# Patient Record
Sex: MALE | Race: WHITE | Employment: OTHER | URBAN - NONMETROPOLITAN AREA
[De-identification: names, ages, dates, MRNs, and addresses within clinical notes are randomized per-mention and may not be internally consistent; named-entity substitution may affect disease eponyms.]

---

## 2017-01-19 ENCOUNTER — OFFICE VISIT (OUTPATIENT)
Dept: INTERNAL MEDICINE CLINIC | Age: 82
End: 2017-01-19

## 2017-01-19 VITALS
HEART RATE: 68 BPM | SYSTOLIC BLOOD PRESSURE: 128 MMHG | RESPIRATION RATE: 16 BRPM | WEIGHT: 207 LBS | TEMPERATURE: 98.8 F | OXYGEN SATURATION: 94 % | BODY MASS INDEX: 31.37 KG/M2 | HEIGHT: 68 IN | DIASTOLIC BLOOD PRESSURE: 74 MMHG

## 2017-01-19 DIAGNOSIS — I77.9 LEFT-SIDED CAROTID ARTERY DISEASE (HCC): ICD-10-CM

## 2017-01-19 DIAGNOSIS — I10 ESSENTIAL HYPERTENSION: Primary | ICD-10-CM

## 2017-01-19 DIAGNOSIS — R73.9 HYPERGLYCEMIA: ICD-10-CM

## 2017-01-19 DIAGNOSIS — E78.2 MIXED HYPERLIPIDEMIA: ICD-10-CM

## 2017-01-19 LAB — HBA1C MFR BLD: 5.9 %

## 2017-01-19 PROCEDURE — 99213 OFFICE O/P EST LOW 20 MIN: CPT | Performed by: INTERNAL MEDICINE

## 2017-01-19 PROCEDURE — 83036 HEMOGLOBIN GLYCOSYLATED A1C: CPT | Performed by: INTERNAL MEDICINE

## 2017-01-19 RX ORDER — LOSARTAN POTASSIUM 100 MG/1
100 TABLET ORAL DAILY
Qty: 90 TABLET | Refills: 1 | Status: SHIPPED | OUTPATIENT
Start: 2017-01-19 | End: 2017-09-11 | Stop reason: SDUPTHER

## 2017-01-19 RX ORDER — VERAPAMIL HYDROCHLORIDE 240 MG/1
240 CAPSULE, EXTENDED RELEASE ORAL DAILY
Qty: 90 CAPSULE | Refills: 1 | Status: SHIPPED | OUTPATIENT
Start: 2017-01-19 | End: 2017-09-11 | Stop reason: SDUPTHER

## 2017-01-19 RX ORDER — HYDROCHLOROTHIAZIDE 12.5 MG/1
12.5 TABLET ORAL DAILY
Qty: 90 TABLET | Refills: 1 | Status: SHIPPED | OUTPATIENT
Start: 2017-01-19 | End: 2017-05-03 | Stop reason: ALTCHOICE

## 2017-01-19 RX ORDER — LOVASTATIN 40 MG/1
40 TABLET ORAL NIGHTLY
Qty: 90 TABLET | Refills: 1 | Status: SHIPPED | OUTPATIENT
Start: 2017-01-19 | End: 2017-09-11 | Stop reason: SDUPTHER

## 2017-01-19 RX ORDER — METOPROLOL SUCCINATE 50 MG/1
50 TABLET, EXTENDED RELEASE ORAL 2 TIMES DAILY
Qty: 180 TABLET | Refills: 1 | Status: SHIPPED | OUTPATIENT
Start: 2017-01-19 | End: 2017-09-11 | Stop reason: SDUPTHER

## 2017-01-19 ASSESSMENT — ENCOUNTER SYMPTOMS
GASTROINTESTINAL NEGATIVE: 1
RESPIRATORY NEGATIVE: 1
EYES NEGATIVE: 1

## 2017-04-20 ENCOUNTER — HOSPITAL ENCOUNTER (OUTPATIENT)
Dept: LAB | Age: 82
Discharge: HOME | End: 2017-04-20
Attending: INTERNAL MEDICINE | Admitting: INTERNAL MEDICINE

## 2017-04-20 LAB
ALBUMIN SERPL-MCNC: 3.8 GM/DL (ref 3.4–5)
ALP BLD-CCNC: 64 IU/L (ref 40–129)
ALT SERPL-CCNC: 15 U/L (ref 10–40)
ANION GAP SERPL CALCULATED.3IONS-SCNC: 15 MMOL/L (ref 4–16)
AST SERPL-CCNC: 16 IU/L (ref 15–37)
BILIRUB SERPL-MCNC: 0.5 MG/DL (ref 0–1)
BUN BLDV-MCNC: 34 MG/DL (ref 6–23)
CALCIUM SERPL-MCNC: 9.1 MG/DL (ref 8.3–10.6)
CHLORIDE BLD-SCNC: 100 MMOL/L (ref 99–110)
CHOLESTEROL, FASTING: 135 MG/DL
CO2: 26 MMOL/L (ref 21–32)
CREAT SERPL-MCNC: 1.7 MG/DL (ref 0.9–1.3)
GFR AFRICAN AMERICAN: 47 ML/MIN/1.73M2
GFR NON-AFRICAN AMERICAN: 39 ML/MIN/1.73M2
GLUCOSE FASTING: 119 MG/DL (ref 70–99)
HDLC SERPL-MCNC: 36 MG/DL
LDL CHOLESTEROL DIRECT: 77 MG/DL
POTASSIUM SERPL-SCNC: 3.8 MMOL/L (ref 3.5–5.1)
SODIUM BLD-SCNC: 141 MMOL/L (ref 135–145)
TOTAL PROTEIN: 8.1 GM/DL (ref 6.4–8.2)
TRIGLYCERIDE, FASTING: 162 MG/DL

## 2017-05-03 ENCOUNTER — OFFICE VISIT (OUTPATIENT)
Dept: INTERNAL MEDICINE CLINIC | Age: 82
End: 2017-05-03

## 2017-05-03 VITALS
OXYGEN SATURATION: 95 % | RESPIRATION RATE: 16 BRPM | WEIGHT: 208.23 LBS | TEMPERATURE: 97.6 F | HEART RATE: 62 BPM | DIASTOLIC BLOOD PRESSURE: 68 MMHG | BODY MASS INDEX: 32.13 KG/M2 | SYSTOLIC BLOOD PRESSURE: 136 MMHG

## 2017-05-03 DIAGNOSIS — I10 ESSENTIAL HYPERTENSION: Primary | ICD-10-CM

## 2017-05-03 DIAGNOSIS — N28.9 RENAL INSUFFICIENCY: ICD-10-CM

## 2017-05-03 DIAGNOSIS — J98.4 RESTRICTIVE LUNG DISEASE: ICD-10-CM

## 2017-05-03 DIAGNOSIS — I77.9 LEFT-SIDED CAROTID ARTERY DISEASE (HCC): ICD-10-CM

## 2017-05-03 DIAGNOSIS — R73.9 HYPERGLYCEMIA: ICD-10-CM

## 2017-05-03 DIAGNOSIS — E78.2 MIXED HYPERLIPIDEMIA: ICD-10-CM

## 2017-05-03 PROCEDURE — 99213 OFFICE O/P EST LOW 20 MIN: CPT | Performed by: INTERNAL MEDICINE

## 2017-05-03 ASSESSMENT — PATIENT HEALTH QUESTIONNAIRE - PHQ9
SUM OF ALL RESPONSES TO PHQ QUESTIONS 1-9: 0
SUM OF ALL RESPONSES TO PHQ9 QUESTIONS 1 & 2: 0
2. FEELING DOWN, DEPRESSED OR HOPELESS: 0
1. LITTLE INTEREST OR PLEASURE IN DOING THINGS: 0

## 2017-05-03 ASSESSMENT — ENCOUNTER SYMPTOMS
RESPIRATORY NEGATIVE: 1
DOUBLE VISION: 0
EYES NEGATIVE: 1
SHORTNESS OF BREATH: 0
BLURRED VISION: 0
GASTROINTESTINAL NEGATIVE: 1
COUGH: 0

## 2017-05-20 ENCOUNTER — HOSPITAL ENCOUNTER (OUTPATIENT)
Dept: LAB | Age: 82
Discharge: HOME | End: 2017-05-20
Attending: INTERNAL MEDICINE | Admitting: INTERNAL MEDICINE

## 2017-05-20 LAB
ANION GAP SERPL CALCULATED.3IONS-SCNC: 11 MMOL/L (ref 4–16)
BUN BLDV-MCNC: 19 MG/DL (ref 6–23)
CALCIUM SERPL-MCNC: 9.3 MG/DL (ref 8.3–10.6)
CHLORIDE BLD-SCNC: 102 MMOL/L (ref 99–110)
CO2: 26 MMOL/L (ref 21–32)
CREAT SERPL-MCNC: 1.2 MG/DL (ref 0.9–1.3)
GFR AFRICAN AMERICAN: >60 ML/MIN/1.73M2
GFR NON-AFRICAN AMERICAN: 58 ML/MIN/1.73M2
GLUCOSE BLD-MCNC: 112 MG/DL (ref 70–140)
POTASSIUM SERPL-SCNC: 4.6 MMOL/L (ref 3.5–5.1)
SODIUM BLD-SCNC: 139 MMOL/L (ref 135–145)

## 2017-06-05 ENCOUNTER — OFFICE VISIT (OUTPATIENT)
Dept: INTERNAL MEDICINE CLINIC | Age: 82
End: 2017-06-05

## 2017-06-05 VITALS
HEIGHT: 68 IN | DIASTOLIC BLOOD PRESSURE: 72 MMHG | TEMPERATURE: 98 F | BODY MASS INDEX: 31.67 KG/M2 | HEART RATE: 64 BPM | RESPIRATION RATE: 12 BRPM | OXYGEN SATURATION: 93 % | WEIGHT: 209 LBS | SYSTOLIC BLOOD PRESSURE: 134 MMHG

## 2017-06-05 DIAGNOSIS — N28.9 RENAL INSUFFICIENCY: Primary | ICD-10-CM

## 2017-06-05 DIAGNOSIS — I77.9 LEFT-SIDED CAROTID ARTERY DISEASE (HCC): ICD-10-CM

## 2017-06-05 DIAGNOSIS — E78.2 MIXED HYPERLIPIDEMIA: ICD-10-CM

## 2017-06-05 DIAGNOSIS — R73.9 HYPERGLYCEMIA: ICD-10-CM

## 2017-06-05 DIAGNOSIS — I10 ESSENTIAL HYPERTENSION: ICD-10-CM

## 2017-06-05 PROCEDURE — 99213 OFFICE O/P EST LOW 20 MIN: CPT | Performed by: INTERNAL MEDICINE

## 2017-06-05 ASSESSMENT — ENCOUNTER SYMPTOMS
NAUSEA: 0
ABDOMINAL PAIN: 0
VOMITING: 0

## 2017-09-11 ENCOUNTER — OFFICE VISIT (OUTPATIENT)
Dept: INTERNAL MEDICINE CLINIC | Age: 82
End: 2017-09-11

## 2017-09-11 VITALS
TEMPERATURE: 97.8 F | RESPIRATION RATE: 20 BRPM | SYSTOLIC BLOOD PRESSURE: 160 MMHG | DIASTOLIC BLOOD PRESSURE: 76 MMHG | OXYGEN SATURATION: 93 % | HEART RATE: 72 BPM | BODY MASS INDEX: 33.15 KG/M2 | WEIGHT: 214.8 LBS

## 2017-09-11 DIAGNOSIS — J98.4 RESTRICTIVE LUNG DISEASE: ICD-10-CM

## 2017-09-11 DIAGNOSIS — E78.2 MIXED HYPERLIPIDEMIA: ICD-10-CM

## 2017-09-11 DIAGNOSIS — R73.9 HYPERGLYCEMIA: ICD-10-CM

## 2017-09-11 DIAGNOSIS — I77.9 LEFT-SIDED CAROTID ARTERY DISEASE (HCC): ICD-10-CM

## 2017-09-11 DIAGNOSIS — N28.9 RENAL INSUFFICIENCY: ICD-10-CM

## 2017-09-11 DIAGNOSIS — I10 ESSENTIAL HYPERTENSION: Primary | ICD-10-CM

## 2017-09-11 PROCEDURE — 99213 OFFICE O/P EST LOW 20 MIN: CPT | Performed by: INTERNAL MEDICINE

## 2017-09-11 RX ORDER — METOPROLOL SUCCINATE 50 MG/1
50 TABLET, EXTENDED RELEASE ORAL 2 TIMES DAILY
Qty: 180 TABLET | Refills: 1 | Status: SHIPPED | OUTPATIENT
Start: 2017-09-11 | End: 2018-01-11 | Stop reason: SDUPTHER

## 2017-09-11 RX ORDER — VERAPAMIL HYDROCHLORIDE 240 MG/1
240 CAPSULE, EXTENDED RELEASE ORAL DAILY
Qty: 90 CAPSULE | Refills: 1 | Status: SHIPPED | OUTPATIENT
Start: 2017-09-11 | End: 2018-01-11 | Stop reason: SDUPTHER

## 2017-09-11 RX ORDER — LOSARTAN POTASSIUM 100 MG/1
100 TABLET ORAL DAILY
Qty: 90 TABLET | Refills: 1 | Status: SHIPPED | OUTPATIENT
Start: 2017-09-11 | End: 2018-01-11 | Stop reason: SDUPTHER

## 2017-09-11 RX ORDER — LOVASTATIN 40 MG/1
40 TABLET ORAL NIGHTLY
Qty: 90 TABLET | Refills: 1 | Status: SHIPPED | OUTPATIENT
Start: 2017-09-11 | End: 2018-01-11 | Stop reason: SDUPTHER

## 2017-09-11 RX ORDER — DOXAZOSIN MESYLATE 1 MG/1
1 TABLET ORAL DAILY
Qty: 90 TABLET | Refills: 1 | Status: SHIPPED | OUTPATIENT
Start: 2017-09-11 | End: 2017-12-12 | Stop reason: ALTCHOICE

## 2017-09-11 ASSESSMENT — ENCOUNTER SYMPTOMS
BLOOD IN STOOL: 0
HEARTBURN: 0
RESPIRATORY NEGATIVE: 1
NAUSEA: 0
EYES NEGATIVE: 1
GASTROINTESTINAL NEGATIVE: 1

## 2017-09-18 ENCOUNTER — TELEPHONE (OUTPATIENT)
Dept: INTERNAL MEDICINE CLINIC | Age: 82
End: 2017-09-18

## 2017-12-11 ENCOUNTER — TELEPHONE (OUTPATIENT)
Dept: INTERNAL MEDICINE CLINIC | Age: 82
End: 2017-12-11

## 2017-12-12 ENCOUNTER — OFFICE VISIT (OUTPATIENT)
Dept: INTERNAL MEDICINE CLINIC | Age: 82
End: 2017-12-12

## 2017-12-12 VITALS
TEMPERATURE: 98.1 F | WEIGHT: 220 LBS | BODY MASS INDEX: 34.53 KG/M2 | SYSTOLIC BLOOD PRESSURE: 120 MMHG | HEART RATE: 76 BPM | RESPIRATION RATE: 12 BRPM | OXYGEN SATURATION: 96 % | DIASTOLIC BLOOD PRESSURE: 64 MMHG | HEIGHT: 67 IN

## 2017-12-12 DIAGNOSIS — J98.4 RESTRICTIVE LUNG DISEASE: ICD-10-CM

## 2017-12-12 DIAGNOSIS — E78.2 MIXED HYPERLIPIDEMIA: ICD-10-CM

## 2017-12-12 DIAGNOSIS — M25.561 ACUTE PAIN OF RIGHT KNEE: Primary | ICD-10-CM

## 2017-12-12 DIAGNOSIS — M17.11 PRIMARY OSTEOARTHRITIS OF RIGHT KNEE: ICD-10-CM

## 2017-12-12 DIAGNOSIS — I10 ESSENTIAL HYPERTENSION: ICD-10-CM

## 2017-12-12 PROCEDURE — 99213 OFFICE O/P EST LOW 20 MIN: CPT | Performed by: INTERNAL MEDICINE

## 2017-12-12 NOTE — PATIENT INSTRUCTIONS
Spoke w Aleta Ya, intake nurse for [facility], had all needed paper work will eval/tx tomorrow.

## 2017-12-12 NOTE — PROGRESS NOTES
ABDOMEN:  Soft and non-tender,no masses  or organomegaly. EXTREMITIES:  No cyanosis, clubbing, or significant edema. Right knee tenderness. SKIN: Skin is warm and dry. NEUROLOGICAL:  Cranial nerves II through XII are grossly intact. IMPRESSION:    Encounter Diagnoses   Name Primary?  Acute pain of right knee Yes    Primary osteoarthritis of right knee     Essential hypertension     Mixed hyperlipidemia     Restrictive lung disease        ASSESSMENT/PLAN:    1. Right knee pain :  Pt is having difficulty ambulating. Will consult [facility] to see pt and do PT etc.  Take Tylenol for pain control. follow-up with the orthopedic surgeon. Patient is a home bound. He does not drive. Patient is not taking Lodine. 2.  Hypertension is controlled continue current medication    3. Hyperlipidemia stable. Patient on lovastatin    4. No shortness of breath    Return to office in 1 month    Mediations reviewed with the patient. Continue current medications. Appropriate prescriptions are addressed. After visit summery provided. Follow up as directed sooner if needed. Questions answered and patient verbalizes understanding. Call for any problems, questions, or concerns. No Known Allergies  Current Outpatient Prescriptions   Medication Sig Dispense Refill    verapamil (VERELAN) 240 MG extended release capsule Take 1 capsule by mouth daily 90 capsule 1    losartan (COZAAR) 100 MG tablet Take 1 tablet by mouth daily 90 tablet 1    metoprolol succinate (TOPROL XL) 50 MG extended release tablet Take 1 tablet by mouth 2 times daily 180 tablet 1    lovastatin (MEVACOR) 40 MG tablet Take 1 tablet by mouth nightly 90 tablet 1    doxazosin (CARDURA) 1 MG tablet Take 1 tablet by mouth daily 90 tablet 1    aspirin 81 MG tablet Take 81 mg by mouth daily.  Omega 3 1000 MG CAPS Take 2 capsules by mouth 2 times daily.       etodolac (LODINE) 400 MG tablet Take 400 mg by mouth 2 times daily     

## 2017-12-17 ASSESSMENT — ENCOUNTER SYMPTOMS
GASTROINTESTINAL NEGATIVE: 1
EYES NEGATIVE: 1

## 2017-12-19 ENCOUNTER — TELEPHONE (OUTPATIENT)
Dept: INTERNAL MEDICINE CLINIC | Age: 82
End: 2017-12-19

## 2018-01-01 ENCOUNTER — HOSPITAL ENCOUNTER (OUTPATIENT)
Dept: OTHER | Age: 83
Discharge: HOME | End: 2018-01-31
Attending: INTERNAL MEDICINE | Admitting: INTERNAL MEDICINE

## 2018-01-11 ENCOUNTER — TELEPHONE (OUTPATIENT)
Dept: INTERNAL MEDICINE CLINIC | Age: 83
End: 2018-01-11

## 2018-01-11 RX ORDER — LOVASTATIN 40 MG/1
40 TABLET ORAL NIGHTLY
Qty: 90 TABLET | Refills: 1 | Status: SHIPPED | OUTPATIENT
Start: 2018-01-11 | End: 2018-05-10 | Stop reason: SDUPTHER

## 2018-01-11 RX ORDER — METOPROLOL SUCCINATE 50 MG/1
50 TABLET, EXTENDED RELEASE ORAL 2 TIMES DAILY
Qty: 180 TABLET | Refills: 1 | Status: SHIPPED | OUTPATIENT
Start: 2018-01-11 | End: 2018-05-10 | Stop reason: SDUPTHER

## 2018-01-11 RX ORDER — LOSARTAN POTASSIUM 100 MG/1
100 TABLET ORAL DAILY
Qty: 90 TABLET | Refills: 1 | Status: SHIPPED | OUTPATIENT
Start: 2018-01-11 | End: 2018-05-10 | Stop reason: SDUPTHER

## 2018-01-11 RX ORDER — VERAPAMIL HYDROCHLORIDE 240 MG/1
240 CAPSULE, EXTENDED RELEASE ORAL DAILY
Qty: 90 CAPSULE | Refills: 1 | Status: SHIPPED | OUTPATIENT
Start: 2018-01-11 | End: 2018-05-10 | Stop reason: SDUPTHER

## 2018-01-12 ENCOUNTER — HOSPITAL ENCOUNTER (OUTPATIENT)
Dept: OTHER | Age: 83
Discharge: HOME | End: 2018-01-12
Attending: INTERNAL MEDICINE | Admitting: INTERNAL MEDICINE

## 2018-01-12 LAB
ERYTHROCYTE SEDIMENTATION RATE: 108 MM/HR (ref 0–20)
URIC ACID: 4.5 MG/DL (ref 3.5–7.2)

## 2018-01-14 LAB — RHEUMATOID FACTOR: NEGATIVE

## 2018-01-15 LAB — ANTI-NUCLEAR ANTIBODY (ANA): NORMAL

## 2018-01-18 LAB
ALBUMIN SERPL-MCNC: 3 GM/DL (ref 3.4–5)
ALP BLD-CCNC: 62 IU/L (ref 40–129)
ALT SERPL-CCNC: 9 U/L (ref 10–40)
ANION GAP SERPL CALCULATED.3IONS-SCNC: 11 MMOL/L (ref 4–16)
AST SERPL-CCNC: 14 IU/L (ref 15–37)
BILIRUB SERPL-MCNC: 0.4 MG/DL (ref 0–1)
BUN BLDV-MCNC: 16 MG/DL (ref 6–23)
CALCIUM SERPL-MCNC: 8.7 MG/DL (ref 8.3–10.6)
CHLORIDE BLD-SCNC: 101 MMOL/L (ref 99–110)
CO2: 26 MMOL/L (ref 21–32)
CREAT SERPL-MCNC: 1 MG/DL (ref 0.9–1.3)
ESTIMATED AVERAGE GLUCOSE: 131 MG/DL
GFR AFRICAN AMERICAN: >60 ML/MIN/1.73M2
GFR NON-AFRICAN AMERICAN: >60 ML/MIN/1.73M2
GLUCOSE BLD-MCNC: 101 MG/DL (ref 70–99)
HBA1C MFR BLD: 6.2 % (ref 4.2–6.3)
LDL CHOLESTEROL DIRECT: 62 MG/DL
POTASSIUM SERPL-SCNC: 4.7 MMOL/L (ref 3.5–5.1)
SODIUM BLD-SCNC: 138 MMOL/L (ref 135–145)
TOTAL PROTEIN: 6.8 GM/DL (ref 6.4–8.2)

## 2018-01-22 LAB — HEMOGLOBIN: 11.3 GM/DL (ref 13.5–18)

## 2018-01-24 ENCOUNTER — OFFICE VISIT (OUTPATIENT)
Dept: INTERNAL MEDICINE CLINIC | Age: 83
End: 2018-01-24

## 2018-01-24 VITALS
DIASTOLIC BLOOD PRESSURE: 80 MMHG | HEART RATE: 79 BPM | BODY MASS INDEX: 33.67 KG/M2 | SYSTOLIC BLOOD PRESSURE: 130 MMHG | RESPIRATION RATE: 18 BRPM | OXYGEN SATURATION: 96 % | WEIGHT: 215 LBS | TEMPERATURE: 97.9 F

## 2018-01-24 DIAGNOSIS — J98.4 RESTRICTIVE LUNG DISEASE: ICD-10-CM

## 2018-01-24 DIAGNOSIS — N28.9 RENAL INSUFFICIENCY: ICD-10-CM

## 2018-01-24 DIAGNOSIS — I10 ESSENTIAL HYPERTENSION: ICD-10-CM

## 2018-01-24 DIAGNOSIS — E78.2 MIXED HYPERLIPIDEMIA: ICD-10-CM

## 2018-01-24 DIAGNOSIS — I77.9 LEFT-SIDED CAROTID ARTERY DISEASE (HCC): ICD-10-CM

## 2018-01-24 DIAGNOSIS — M25.561 ACUTE PAIN OF RIGHT KNEE: ICD-10-CM

## 2018-01-24 DIAGNOSIS — R73.9 HYPERGLYCEMIA: ICD-10-CM

## 2018-01-24 PROCEDURE — 99214 OFFICE O/P EST MOD 30 MIN: CPT | Performed by: INTERNAL MEDICINE

## 2018-01-24 ASSESSMENT — ENCOUNTER SYMPTOMS
EYES NEGATIVE: 1
WHEEZING: 0
SHORTNESS OF BREATH: 0
RESPIRATORY NEGATIVE: 1
GASTROINTESTINAL NEGATIVE: 1
ORTHOPNEA: 0
HEMOPTYSIS: 0
COUGH: 0

## 2018-01-24 NOTE — ASSESSMENT & PLAN NOTE
Patient has pain in the right knee for few months  Has seen Dr Brandy Valdivia and is going to have arthroscopic surgery  [address] nurse state they have all information and anesthesiologist approved it. EKG showed no acute changes looks normal. Done at [facility].  Pt denies any chest pain. No SOB.  No h/o CHF

## 2018-01-24 NOTE — ASSESSMENT & PLAN NOTE
Hyperlipidemia is stable. Follow low cholesterol diet. Continue current treatment.   Patient has hyperlipidemia and is taking lovastatin 40 mg daily

## 2018-01-24 NOTE — PROGRESS NOTES
noted. No masses felt,  CARDIOVASCULAR:  Normal S1 and S2    PULMONARY:  No respiratory distress. No wheezes or rales. ABDOMEN:  Soft and non-tender,no masses  or organomegaly. EXTREMITIES:  No cyanosis, clubbing, or significant edema. Right knee tenderness. No effusion. No calf tenderness. SKIN: Skin is warm and dry. NEUROLOGICAL:  Cranial nerves II through XII are grossly intact. IMPRESSION:    Encounter Diagnoses   Name Primary?  Essential hypertension     Mixed hyperlipidemia     Hyperglycemia     Left-sided carotid artery disease ([facility]): R      Restrictive lung disease     Renal insufficiency     Acute pain of right knee        ASSESSMENT/PLAN:    Essential hypertension  Patient has hypertension and is on medications  He is taking verapamil 240 mg daily, losartan 100 mg daily, metoprolol 50 mg twice a day    Mixed hyperlipidemia  Hyperlipidemia is stable. Follow low cholesterol diet. Continue current treatment. Patient has hyperlipidemia and is taking lovastatin 40 mg daily      Hyperglycemia  Pt denies any polyuria and polydypsia  A1c 6.2   Follow diet     Left-sided carotid artery disease ([facility]): R   Complete occlusion of left. R carotid endarterectomy  Carotid doppler . R  Is patent. Carotid doppler 6/15  R is patent  Carotid doppler 10/16 R is 0-50%  Left is completely occluded,referred to Dr. Mady Du. Recheck in one year but did not see him   Pt to call and make appointment  Will order carotid doppler    Restrictive lung disease  Patient denies any shortness of breath. No chest pain. No cough. No edema. Renal insufficiency  Cr is 1.0. Symptoms resolved. Right knee pain  Patient has pain in the right knee for few months  Has seen Dr Elayne Carolina and is going to have arthroscopic surgery  [address] [place] [address] nurse state they have all information and anesthesiologist approved it.   EKG showed no acute changes looks normal. Done at [facility].  Pt denies any chest pain. No SOB. No h/o CHF    Return to office in a month I will see him in assisted living. Mediations reviewed with the patient. Continue current medications. Appropriate prescriptions are addressed. After visit jazmyne provided. Follow up as directed sooner if needed. Questions answered and patient verbalizes understanding. Call for any problems, questions, or concerns. No Known Allergies  Current Outpatient Prescriptions   Medication Sig Dispense Refill    losartan (COZAAR) 100 MG tablet Take 1 tablet by mouth daily 90 tablet 1    lovastatin (MEVACOR) 40 MG tablet Take 1 tablet by mouth nightly 90 tablet 1    metoprolol succinate (TOPROL XL) 50 MG extended release tablet Take 1 tablet by mouth 2 times daily 180 tablet 1    verapamil (VERELAN) 240 MG extended release capsule Take 1 capsule by mouth daily 90 capsule 1    aspirin 81 MG tablet Take 81 mg by mouth daily.  Omega 3 1000 MG CAPS Take 2 capsules by mouth 2 times daily. No current facility-administered medications for this visit. Past Medical History:   Diagnosis Date    Carotid artery disease ([facility]     complete occlusion left ICA, status post right carotid endarterectomy    Cellulitis of right lower extremity 7/1/2016    Was admitted in [address] [place] [address] in 7/16    Chronic cough 12/18/2015    Chest x-ray showed mild pulmonary congestion that is chronic. And some atelectasis PFT showed no obstructive disease. Has restrictive lung disease.  H/O colonoscopy 8/10    Dr. Rah Upton H/O Doppler ultrasound 08/2009, 2011,5/13    carotid doppler left % blocked, right patent    HTN (hypertension)     Hyperglycemia 11/19/2014    A1c 6.1    Hyperlipidemia     Left-sided carotid artery disease ([facility] 9/18/2015    Complete occlusion of left. R carotid endarterectomy Carotid doppler 5/13. R  Is patent. Carotid doppler 6/15  R is patent    Tobacco abuse disorder 8/15/2013    60 pack year history.  Quit in 2016    Ulcer of right foot with fat layer exposed ([facility] 7/8/2016     Past Surgical History:   Procedure Laterality Date    CAROTID ENDARTERECTOMY  01/2004    right     Social History   Substance Use Topics    Smoking status: Former Smoker     Types: Cigars     Quit date: 7/1/2016    Smokeless tobacco: Never Used    Alcohol use 0.0 oz/week      Comment: rare       LAB REVIEW:  CBC:   Lab Results   Component Value Date    WBC 8.5 10/04/2016    HGB 11.3 01/22/2018    HCT 40.6 10/04/2016     10/04/2016     Lipids:   Lab Results   Component Value Date    CHOL 163 10/04/2016    TRIG 272 (H) 10/04/2016    HDL 36 (L) 04/20/2017    LDLDIRECT 62 01/18/2018    TRIGLYCFAST 162 (H) 04/20/2017    CHOLESTFAST 135 04/20/2017     Renal:   Lab Results   Component Value Date    BUN 16 01/18/2018    CREATININE 1.0 01/18/2018     01/18/2018    K 4.7 01/18/2018    ALT 9 01/18/2018    AST 14 01/18/2018    GLUCOSE 101 01/18/2018     PT/INR:   Lab Results   Component Value Date    INR 1.17 06/30/2016     A1C:   Lab Results   Component Value Date    LABA1C 6.2 01/18/2018           Celina Calloway MD, 1/24/2018 , 3:28 PM

## 2018-01-24 NOTE — ASSESSMENT & PLAN NOTE
Complete occlusion of left. R carotid endarterectomy  Carotid doppler 5/13. R  Is patent. Carotid doppler 6/15  R is patent  Carotid doppler 10/16 R is 0-50%  Left is completely occluded,referred to Dr. Lowella Hillier Dr Kevin Landau.  Recheck in one year but did not see him   Pt to call and make appointment  Will order carotid doppler

## 2018-02-01 ENCOUNTER — HOSPITAL ENCOUNTER (OUTPATIENT)
Dept: OTHER | Age: 83
Discharge: HOME | End: 2018-02-28
Attending: INTERNAL MEDICINE | Admitting: INTERNAL MEDICINE

## 2018-02-01 LAB — ERYTHROCYTE SEDIMENTATION RATE: 36 MM/HR (ref 0–20)

## 2018-02-14 ENCOUNTER — HOSPITAL ENCOUNTER (OUTPATIENT)
Dept: ULTRASOUND IMAGING | Age: 83
Discharge: HOME | End: 2018-02-14
Attending: INTERNAL MEDICINE | Admitting: INTERNAL MEDICINE

## 2018-02-14 DIAGNOSIS — I77.9 DISORDER OF ARTERY OR ARTERIOLE (HCC): ICD-10-CM

## 2018-02-14 DIAGNOSIS — I77.9 DISEASE OF ARTERY (HCC): ICD-10-CM

## 2018-03-01 ENCOUNTER — HOSPITAL ENCOUNTER (OUTPATIENT)
Dept: OTHER | Age: 83
Discharge: HOME | End: 2018-03-31
Attending: INTERNAL MEDICINE | Admitting: INTERNAL MEDICINE

## 2018-03-26 ENCOUNTER — HOSPITAL ENCOUNTER (OUTPATIENT)
Dept: LAB | Age: 83
Discharge: HOME | End: 2018-03-26
Attending: ANESTHESIOLOGY | Admitting: ANESTHESIOLOGY

## 2018-03-26 ENCOUNTER — TELEPHONE (OUTPATIENT)
Dept: INTERNAL MEDICINE CLINIC | Age: 83
End: 2018-03-26

## 2018-03-26 DIAGNOSIS — Z01.811 PRE-OP CHEST EXAM: ICD-10-CM

## 2018-03-26 LAB — HEMOGLOBIN: 12.1 GM/DL (ref 13.5–18)

## 2018-03-27 LAB
BASOPHILS ABSOLUTE: 0.1 K/CU MM
BASOPHILS RELATIVE PERCENT: 1.3 % (ref 0–1)
DIFFERENTIAL TYPE: ABNORMAL
EOSINOPHILS ABSOLUTE: 0.3 K/CU MM
EOSINOPHILS RELATIVE PERCENT: 3.2 % (ref 0–3)
HCT VFR BLD CALC: 38.8 % (ref 42–52)
HEMOGLOBIN: 12.3 GM/DL (ref 13.5–18)
IMMATURE NEUTROPHIL %: 0.6 % (ref 0–0.43)
LYMPHOCYTES ABSOLUTE: 2.5 K/CU MM
LYMPHOCYTES RELATIVE PERCENT: 25.4 % (ref 24–44)
MCH RBC QN AUTO: 29.9 PG (ref 27–31)
MCHC RBC AUTO-ENTMCNC: 31.7 % (ref 32–36)
MCV RBC AUTO: 94.2 FL (ref 78–100)
MONOCYTES ABSOLUTE: 1.1 K/CU MM
MONOCYTES RELATIVE PERCENT: 10.9 % (ref 0–4)
NUCLEATED RBC %: 0 %
PDW BLD-RTO: 13.3 % (ref 11.7–14.9)
PLATELET # BLD: 291 K/CU MM (ref 140–440)
PMV BLD AUTO: 10.4 FL (ref 7.5–11.1)
RBC # BLD: 4.12 M/CU MM (ref 4.6–6.2)
SEGMENTED NEUTROPHILS ABSOLUTE COUNT: 5.7 K/CU MM
SEGMENTED NEUTROPHILS RELATIVE PERCENT: 58.6 % (ref 36–66)
TOTAL IMMATURE NEUTOROPHIL: 0.06 K/CU MM
TOTAL NUCLEATED RBC: 0 K/CU MM
WBC # BLD: 9.7 K/CU MM (ref 4–10.5)

## 2018-04-01 ENCOUNTER — HOSPITAL ENCOUNTER (OUTPATIENT)
Dept: OTHER | Age: 83
Discharge: HOME | End: 2018-04-30
Attending: INTERNAL MEDICINE | Admitting: INTERNAL MEDICINE

## 2018-04-08 PROBLEM — C44.321 SQUAMOUS CELL CARCINOMA OF NOSE: Status: ACTIVE | Noted: 2018-04-08

## 2018-04-09 ENCOUNTER — HOSPITAL ENCOUNTER (OUTPATIENT)
Dept: GENERAL RADIOLOGY | Age: 83
Discharge: HOME | End: 2018-04-09
Attending: SURGERY | Admitting: SURGERY

## 2018-04-09 DIAGNOSIS — C44.321 SQUAMOUS CELL CANCER OF SKIN OF ALA NASI: ICD-10-CM

## 2018-04-25 PROBLEM — I71.40 ABDOMINAL AORTIC ANEURYSM (AAA) WITHOUT RUPTURE (HCC): Status: ACTIVE | Noted: 2018-04-25

## 2018-05-01 ENCOUNTER — HOSPITAL ENCOUNTER (OUTPATIENT)
Dept: OTHER | Age: 83
Discharge: HOME | End: 2018-05-31
Attending: INTERNAL MEDICINE | Admitting: INTERNAL MEDICINE

## 2018-05-10 ENCOUNTER — OFFICE VISIT (OUTPATIENT)
Dept: INTERNAL MEDICINE CLINIC | Age: 83
End: 2018-05-10

## 2018-05-10 VITALS
DIASTOLIC BLOOD PRESSURE: 82 MMHG | RESPIRATION RATE: 16 BRPM | SYSTOLIC BLOOD PRESSURE: 130 MMHG | WEIGHT: 205.8 LBS | OXYGEN SATURATION: 97 % | HEART RATE: 72 BPM | BODY MASS INDEX: 32.23 KG/M2 | TEMPERATURE: 98.4 F

## 2018-05-10 DIAGNOSIS — M25.561 ACUTE PAIN OF RIGHT KNEE: ICD-10-CM

## 2018-05-10 DIAGNOSIS — I71.40 ABDOMINAL AORTIC ANEURYSM (AAA) WITHOUT RUPTURE: ICD-10-CM

## 2018-05-10 DIAGNOSIS — C44.321 SQUAMOUS CELL CARCINOMA OF NOSE: ICD-10-CM

## 2018-05-10 DIAGNOSIS — I10 ESSENTIAL HYPERTENSION: Primary | ICD-10-CM

## 2018-05-10 DIAGNOSIS — E78.2 MIXED HYPERLIPIDEMIA: ICD-10-CM

## 2018-05-10 DIAGNOSIS — I77.9 LEFT-SIDED CAROTID ARTERY DISEASE (HCC): ICD-10-CM

## 2018-05-10 DIAGNOSIS — M17.0 ARTHRITIS OF BOTH KNEES: ICD-10-CM

## 2018-05-10 DIAGNOSIS — R73.9 HYPERGLYCEMIA: ICD-10-CM

## 2018-05-10 DIAGNOSIS — N28.9 RENAL INSUFFICIENCY: ICD-10-CM

## 2018-05-10 DIAGNOSIS — J98.4 RESTRICTIVE LUNG DISEASE: ICD-10-CM

## 2018-05-10 PROCEDURE — 99214 OFFICE O/P EST MOD 30 MIN: CPT | Performed by: INTERNAL MEDICINE

## 2018-05-10 RX ORDER — LOVASTATIN 40 MG/1
40 TABLET ORAL NIGHTLY
Qty: 90 TABLET | Refills: 1 | Status: SHIPPED | OUTPATIENT
Start: 2018-05-10 | End: 2018-10-11 | Stop reason: ALTCHOICE

## 2018-05-10 RX ORDER — LOSARTAN POTASSIUM 100 MG/1
100 TABLET ORAL DAILY
Qty: 90 TABLET | Refills: 1 | Status: SHIPPED | OUTPATIENT
Start: 2018-05-10

## 2018-05-10 RX ORDER — METOPROLOL SUCCINATE 50 MG/1
50 TABLET, EXTENDED RELEASE ORAL 2 TIMES DAILY
Qty: 180 TABLET | Refills: 1 | Status: ON HOLD | OUTPATIENT
Start: 2018-05-10 | End: 2019-01-01 | Stop reason: CLARIF

## 2018-05-10 RX ORDER — VERAPAMIL HYDROCHLORIDE 240 MG/1
240 CAPSULE, EXTENDED RELEASE ORAL DAILY
Qty: 90 CAPSULE | Refills: 1 | Status: SHIPPED | OUTPATIENT
Start: 2018-05-10

## 2018-05-10 ASSESSMENT — PATIENT HEALTH QUESTIONNAIRE - PHQ9
SUM OF ALL RESPONSES TO PHQ9 QUESTIONS 1 & 2: 0
SUM OF ALL RESPONSES TO PHQ QUESTIONS 1-9: 0
1. LITTLE INTEREST OR PLEASURE IN DOING THINGS: 0
2. FEELING DOWN, DEPRESSED OR HOPELESS: 0

## 2018-05-10 ASSESSMENT — ENCOUNTER SYMPTOMS
GASTROINTESTINAL NEGATIVE: 1
RESPIRATORY NEGATIVE: 1
EYES NEGATIVE: 1
SORE THROAT: 0

## 2018-05-14 LAB
ALBUMIN SERPL-MCNC: 3.8 GM/DL (ref 3.4–5)
ALP BLD-CCNC: 64 IU/L (ref 40–128)
ALT SERPL-CCNC: 10 U/L (ref 10–40)
ANION GAP SERPL CALCULATED.3IONS-SCNC: 15 MMOL/L (ref 4–16)
AST SERPL-CCNC: 13 IU/L (ref 15–37)
BILIRUB SERPL-MCNC: 0.4 MG/DL (ref 0–1)
BUN BLDV-MCNC: 23 MG/DL (ref 6–23)
CALCIUM SERPL-MCNC: 9.1 MG/DL (ref 8.3–10.6)
CHLORIDE BLD-SCNC: 100 MMOL/L (ref 99–110)
CHOLESTEROL: 147 MG/DL
CO2: 24 MMOL/L (ref 21–32)
CREAT SERPL-MCNC: 1 MG/DL (ref 0.9–1.3)
GFR AFRICAN AMERICAN: >60 ML/MIN/1.73M2
GFR NON-AFRICAN AMERICAN: >60 ML/MIN/1.73M2
GLUCOSE BLD-MCNC: 118 MG/DL (ref 70–99)
HDLC SERPL-MCNC: 46 MG/DL
LDL CHOLESTEROL DIRECT: 91 MG/DL
POTASSIUM SERPL-SCNC: 4.5 MMOL/L (ref 3.5–5.1)
SODIUM BLD-SCNC: 139 MMOL/L (ref 135–145)
TOTAL PROTEIN: 6.9 GM/DL (ref 6.4–8.2)
TRIGL SERPL-MCNC: 144 MG/DL

## 2018-06-01 ENCOUNTER — HOSPITAL ENCOUNTER (OUTPATIENT)
Dept: OTHER | Age: 83
Discharge: HOME | End: 2018-06-30
Attending: INTERNAL MEDICINE | Admitting: INTERNAL MEDICINE

## 2018-06-20 RX ORDER — LOSARTAN POTASSIUM 100 MG/1
100 TABLET ORAL DAILY
Qty: 90 TABLET | Refills: 1 | Status: CANCELLED | OUTPATIENT
Start: 2018-06-20

## 2018-09-26 ENCOUNTER — OFFICE VISIT (OUTPATIENT)
Dept: INTERNAL MEDICINE CLINIC | Age: 83
End: 2018-09-26
Payer: COMMERCIAL

## 2018-09-26 VITALS
DIASTOLIC BLOOD PRESSURE: 78 MMHG | BODY MASS INDEX: 34.9 KG/M2 | OXYGEN SATURATION: 91 % | HEART RATE: 84 BPM | TEMPERATURE: 97.8 F | SYSTOLIC BLOOD PRESSURE: 138 MMHG | WEIGHT: 222.8 LBS | RESPIRATION RATE: 20 BRPM

## 2018-09-26 DIAGNOSIS — I71.40 ABDOMINAL AORTIC ANEURYSM (AAA) WITHOUT RUPTURE: ICD-10-CM

## 2018-09-26 DIAGNOSIS — E78.2 MIXED HYPERLIPIDEMIA: ICD-10-CM

## 2018-09-26 DIAGNOSIS — I10 ESSENTIAL HYPERTENSION: ICD-10-CM

## 2018-09-26 DIAGNOSIS — N28.9 RENAL INSUFFICIENCY: ICD-10-CM

## 2018-09-26 DIAGNOSIS — I77.9 LEFT-SIDED CAROTID ARTERY DISEASE (HCC): ICD-10-CM

## 2018-09-26 DIAGNOSIS — R60.0 LEG EDEMA, LEFT: Primary | ICD-10-CM

## 2018-09-26 DIAGNOSIS — C44.321 SQUAMOUS CELL CARCINOMA OF NOSE: ICD-10-CM

## 2018-09-26 PROCEDURE — 99213 OFFICE O/P EST LOW 20 MIN: CPT | Performed by: INTERNAL MEDICINE

## 2018-09-26 RX ORDER — CEPHALEXIN 500 MG/1
500 CAPSULE ORAL 3 TIMES DAILY
Qty: 30 CAPSULE | Refills: 0 | Status: SHIPPED | OUTPATIENT
Start: 2018-09-26 | End: 2018-10-11 | Stop reason: ALTCHOICE

## 2018-09-26 RX ORDER — CEPHALEXIN 500 MG/1
500 CAPSULE ORAL 3 TIMES DAILY
Qty: 30 CAPSULE | Refills: 0 | Status: SHIPPED | OUTPATIENT
Start: 2018-09-26 | End: 2018-09-26 | Stop reason: SDUPTHER

## 2018-09-26 NOTE — PROGRESS NOTES
Left-sided carotid artery disease ([facility]): R         ASSESSMENT/PLAN:      Will get venous Doppler studies left leg  Keep the leg elevated  Warm compresses  Keflex 500 mg 3 times a day for 10 days  Patient is here with his son  Continue rest of the medications  Hypertension is controlled continue the same. 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 His deciding what reconstructive surgery. Carotid artery disease stable. Abdominal aortic aneurysm stable. Orders Placed This Encounter   Procedures    US DOPPLER VENOUS LEG LEFT       Mediations reviewed with the patient. Continue current medications. Appropriate prescriptions are addressed. After visit summery provided. Follow up as directed sooner if needed. Questions answered and patient verbalizes understanding. Call for any problems, questions, or concerns. No Known Allergies  Current Outpatient Prescriptions   Medication Sig Dispense Refill    cephALEXin (KEFLEX) 500 MG capsule Take 1 capsule by mouth 3 times daily 30 capsule 0    losartan (COZAAR) 100 MG tablet Take 1 tablet by mouth daily 90 tablet 1    lovastatin (MEVACOR) 40 MG tablet Take 1 tablet by mouth nightly 90 tablet 1    metoprolol succinate (TOPROL XL) 50 MG extended release tablet Take 1 tablet by mouth 2 times daily 180 tablet 1    verapamil (VERELAN) 240 MG extended release capsule Take 1 capsule by mouth daily 90 capsule 1    Omega 3 1000 MG CAPS Take 2 capsules by mouth 2 times daily. No current facility-administered medications for this visit. Past Medical History:   Diagnosis Date    Carotid artery disease ([facility]     complete occlusion left ICA, status post right carotid endarterectomy    Cellulitis of right lower extremity 2016    Was admitted in [address] [place] [address] in     Chronic cough 2015    Chest x-ray showed mild pulmonary congestion that is chronic.   And

## 2018-09-27 ENCOUNTER — HOSPITAL ENCOUNTER (OUTPATIENT)
Dept: ULTRASOUND IMAGING | Age: 83
Discharge: HOME | End: 2018-09-27
Payer: COMMERCIAL

## 2018-09-27 DIAGNOSIS — R60.0 LEG EDEMA, LEFT: ICD-10-CM

## 2018-09-27 PROCEDURE — 93971 EXTREMITY STUDY: CPT

## 2018-10-11 ENCOUNTER — OFFICE VISIT (OUTPATIENT)
Dept: INTERNAL MEDICINE CLINIC | Age: 83
End: 2018-10-11
Payer: COMMERCIAL

## 2018-10-11 VITALS — WEIGHT: 224 LBS | TEMPERATURE: 98.6 F | BODY MASS INDEX: 35.08 KG/M2

## 2018-10-11 DIAGNOSIS — C44.321 SQUAMOUS CELL CARCINOMA OF NOSE: ICD-10-CM

## 2018-10-11 DIAGNOSIS — I10 ESSENTIAL HYPERTENSION: ICD-10-CM

## 2018-10-11 DIAGNOSIS — E78.2 MIXED HYPERLIPIDEMIA: ICD-10-CM

## 2018-10-11 DIAGNOSIS — J98.4 RESTRICTIVE LUNG DISEASE: ICD-10-CM

## 2018-10-11 DIAGNOSIS — I71.40 ABDOMINAL AORTIC ANEURYSM (AAA) WITHOUT RUPTURE: ICD-10-CM

## 2018-10-11 DIAGNOSIS — I77.9 LEFT-SIDED CAROTID ARTERY DISEASE, UNSPECIFIED TYPE (HCC): ICD-10-CM

## 2018-10-11 DIAGNOSIS — R73.9 HYPERGLYCEMIA: ICD-10-CM

## 2018-10-11 DIAGNOSIS — R60.0 EDEMA OF LEG: Primary | ICD-10-CM

## 2018-10-11 DIAGNOSIS — M25.561 ACUTE PAIN OF RIGHT KNEE: ICD-10-CM

## 2018-10-11 PROCEDURE — 99213 OFFICE O/P EST LOW 20 MIN: CPT | Performed by: INTERNAL MEDICINE

## 2018-10-11 RX ORDER — ATORVASTATIN CALCIUM 10 MG/1
10 TABLET, FILM COATED ORAL DAILY
COMMUNITY

## 2018-10-12 LAB
ALBUMIN SERPL-MCNC: 3.6 G/DL
ALP BLD-CCNC: 63 U/L
ALT SERPL-CCNC: 10 U/L
ANION GAP SERPL CALCULATED.3IONS-SCNC: ABNORMAL MMOL/L
AST SERPL-CCNC: 16 U/L
BASOPHILS ABSOLUTE: 0.2 /ΜL
BASOPHILS RELATIVE PERCENT: 1.9 %
BILIRUB SERPL-MCNC: 0.6 MG/DL (ref 0.1–1.4)
BUN BLDV-MCNC: 21 MG/DL
CALCIUM SERPL-MCNC: 9.1 MG/DL
CHLORIDE BLD-SCNC: 99 MMOL/L
CHOLESTEROL, TOTAL: 149 MG/DL
CHOLESTEROL/HDL RATIO: 1.8
CO2: 28 MMOL/L
CREAT SERPL-MCNC: 1.2 MG/DL
EOSINOPHILS ABSOLUTE: 0.4 /ΜL
EOSINOPHILS RELATIVE PERCENT: 3.9 %
GFR CALCULATED: 58
GLUCOSE BLD-MCNC: 123 MG/DL
HCT VFR BLD CALC: 42.6 % (ref 41–53)
HDLC SERPL-MCNC: 43 MG/DL (ref 35–70)
HEMOGLOBIN: 13.8 G/DL (ref 13.5–17.5)
LDL CHOLESTEROL CALCULATED: 78 MG/DL (ref 0–160)
LYMPHOCYTES ABSOLUTE: 2.5 /ΜL
LYMPHOCYTES RELATIVE PERCENT: 26.6 %
MCH RBC QN AUTO: 29.9 PG
MCHC RBC AUTO-ENTMCNC: 32.4 G/DL
MCV RBC AUTO: 92.5 FL
MONOCYTES ABSOLUTE: 1.1 /ΜL
MONOCYTES RELATIVE PERCENT: 11.4 %
NEUTROPHILS ABSOLUTE: 5.4 /ΜL
NEUTROPHILS RELATIVE PERCENT: 56.2 %
PDW BLD-RTO: 14.2 %
PLATELET # BLD: 275 K/ΜL
PMV BLD AUTO: 8.8 FL
POTASSIUM SERPL-SCNC: 5 MMOL/L
RBC # BLD: 4.61 10^6/ΜL
SODIUM BLD-SCNC: 134 MMOL/L
TOTAL PROTEIN: 7.2
TRIGL SERPL-MCNC: 138 MG/DL
VLDLC SERPL CALC-MCNC: 28 MG/DL
WBC # BLD: 9.6 10^3/ML

## 2018-10-27 ASSESSMENT — ENCOUNTER SYMPTOMS
GASTROINTESTINAL NEGATIVE: 1
EYES NEGATIVE: 1
RESPIRATORY NEGATIVE: 1
WHEEZING: 0
COUGH: 0

## 2019-01-01 ENCOUNTER — APPOINTMENT (OUTPATIENT)
Dept: GENERAL RADIOLOGY | Age: 84
DRG: 291 | End: 2019-01-01
Payer: COMMERCIAL

## 2019-01-01 ENCOUNTER — APPOINTMENT (OUTPATIENT)
Dept: GENERAL RADIOLOGY | Age: 84
DRG: 253 | End: 2019-01-01
Attending: SURGERY
Payer: COMMERCIAL

## 2019-01-01 ENCOUNTER — HOSPITAL ENCOUNTER (OUTPATIENT)
Dept: GENERAL RADIOLOGY | Age: 84
Discharge: HOME | DRG: 253 | End: 2019-06-19
Attending: SURGERY
Payer: COMMERCIAL

## 2019-01-01 ENCOUNTER — ANESTHESIA (OUTPATIENT)
Dept: OPERATING ROOM | Age: 84
DRG: 253 | End: 2019-01-01
Payer: COMMERCIAL

## 2019-01-01 ENCOUNTER — HOSPITAL ENCOUNTER (OUTPATIENT)
Dept: PREADMISSION TESTING | Age: 84
Setting detail: SURGERY ADMIT
Discharge: HOME | DRG: 253 | End: 2019-06-23
Payer: COMMERCIAL

## 2019-01-01 ENCOUNTER — APPOINTMENT (OUTPATIENT)
Dept: NUCLEAR MEDICINE | Age: 84
DRG: 291 | End: 2019-01-01
Attending: INTERNAL MEDICINE
Payer: COMMERCIAL

## 2019-01-01 ENCOUNTER — APPOINTMENT (OUTPATIENT)
Dept: CT IMAGING | Age: 84
DRG: 291 | End: 2019-01-01
Payer: COMMERCIAL

## 2019-01-01 ENCOUNTER — HOSPITAL ENCOUNTER (OUTPATIENT)
Age: 84
Discharge: HOME | DRG: 253 | End: 2019-06-19
Attending: SURGERY
Payer: COMMERCIAL

## 2019-01-01 ENCOUNTER — HOSPITAL ENCOUNTER (OUTPATIENT)
Dept: CARDIAC CATH/INVASIVE PROCEDURES | Age: 84
Discharge: NURSING, REHAB OR LONG-TERM CARE | End: 2019-06-10
Attending: THORACIC SURGERY (CARDIOTHORACIC VASCULAR SURGERY) | Admitting: THORACIC SURGERY (CARDIOTHORACIC VASCULAR SURGERY)
Payer: COMMERCIAL

## 2019-01-01 ENCOUNTER — ANESTHESIA EVENT (OUTPATIENT)
Dept: OPERATING ROOM | Age: 84
DRG: 253 | End: 2019-01-01
Payer: COMMERCIAL

## 2019-01-01 ENCOUNTER — HOSPITAL ENCOUNTER (INPATIENT)
Age: 84
LOS: 10 days | Discharge: NURSING, REHAB OR LONG-TERM CARE | DRG: 291 | End: 2019-09-04
Attending: INTERNAL MEDICINE | Admitting: INTERNAL MEDICINE
Payer: COMMERCIAL

## 2019-01-01 ENCOUNTER — APPOINTMENT (OUTPATIENT)
Dept: ULTRASOUND IMAGING | Age: 84
DRG: 291 | End: 2019-01-01
Payer: COMMERCIAL

## 2019-01-01 ENCOUNTER — APPOINTMENT (OUTPATIENT)
Dept: GENERAL RADIOLOGY | Age: 84
DRG: 193 | End: 2019-01-01
Payer: COMMERCIAL

## 2019-01-01 ENCOUNTER — HOSPITAL ENCOUNTER (INPATIENT)
Age: 84
LOS: 7 days | Discharge: NURSING, REHAB OR LONG-TERM CARE | DRG: 193 | End: 2019-12-23
Attending: EMERGENCY MEDICINE | Admitting: FAMILY MEDICINE
Payer: COMMERCIAL

## 2019-01-01 ENCOUNTER — APPOINTMENT (OUTPATIENT)
Dept: GENERAL RADIOLOGY | Age: 84
DRG: 291 | End: 2019-01-01
Attending: INTERNAL MEDICINE
Payer: COMMERCIAL

## 2019-01-01 ENCOUNTER — CARE COORDINATION (OUTPATIENT)
Dept: CASE MANAGEMENT | Age: 84
End: 2019-01-01

## 2019-01-01 ENCOUNTER — OFFICE VISIT (OUTPATIENT)
Dept: PULMONOLOGY | Age: 84
End: 2019-01-01
Payer: COMMERCIAL

## 2019-01-01 ENCOUNTER — HOSPITAL ENCOUNTER (INPATIENT)
Age: 84
LOS: 7 days | Discharge: NURSING, REHAB OR LONG-TERM CARE | DRG: 291 | End: 2019-08-25
Attending: EMERGENCY MEDICINE | Admitting: HOSPITALIST
Payer: COMMERCIAL

## 2019-01-01 ENCOUNTER — HOSPITAL ENCOUNTER (INPATIENT)
Age: 84
LOS: 4 days | Discharge: HOME | DRG: 253 | End: 2019-06-24
Attending: SURGERY | Admitting: SURGERY
Payer: COMMERCIAL

## 2019-01-01 ENCOUNTER — OUTSIDE SERVICES (OUTPATIENT)
Dept: WOUND CARE | Age: 84
End: 2019-01-01
Payer: COMMERCIAL

## 2019-01-01 ENCOUNTER — APPOINTMENT (OUTPATIENT)
Dept: MRI IMAGING | Age: 84
DRG: 291 | End: 2019-01-01
Payer: COMMERCIAL

## 2019-01-01 ENCOUNTER — TELEPHONE (OUTPATIENT)
Dept: PULMONOLOGY | Age: 84
End: 2019-01-01

## 2019-01-01 ENCOUNTER — APPOINTMENT (OUTPATIENT)
Dept: CT IMAGING | Age: 84
DRG: 193 | End: 2019-01-01
Payer: COMMERCIAL

## 2019-01-01 VITALS
TEMPERATURE: 98 F | HEART RATE: 76 BPM | HEIGHT: 67 IN | DIASTOLIC BLOOD PRESSURE: 95 MMHG | OXYGEN SATURATION: 87 % | RESPIRATION RATE: 17 BRPM | WEIGHT: 235 LBS | SYSTOLIC BLOOD PRESSURE: 178 MMHG | BODY MASS INDEX: 36.88 KG/M2

## 2019-01-01 VITALS
OXYGEN SATURATION: 96 % | SYSTOLIC BLOOD PRESSURE: 114 MMHG | DIASTOLIC BLOOD PRESSURE: 52 MMHG | BODY MASS INDEX: 32.96 KG/M2 | HEART RATE: 88 BPM | HEIGHT: 67 IN | WEIGHT: 210 LBS

## 2019-01-01 VITALS
SYSTOLIC BLOOD PRESSURE: 130 MMHG | TEMPERATURE: 97.5 F | WEIGHT: 218.6 LBS | DIASTOLIC BLOOD PRESSURE: 67 MMHG | HEIGHT: 67 IN | RESPIRATION RATE: 16 BRPM | OXYGEN SATURATION: 96 % | HEART RATE: 81 BPM | BODY MASS INDEX: 34.31 KG/M2

## 2019-01-01 VITALS
TEMPERATURE: 98.6 F | SYSTOLIC BLOOD PRESSURE: 126 MMHG | OXYGEN SATURATION: 97 % | RESPIRATION RATE: 38 BRPM | DIASTOLIC BLOOD PRESSURE: 68 MMHG

## 2019-01-01 VITALS
RESPIRATION RATE: 21 BRPM | SYSTOLIC BLOOD PRESSURE: 121 MMHG | HEART RATE: 70 BPM | WEIGHT: 221.12 LBS | OXYGEN SATURATION: 93 % | TEMPERATURE: 97.7 F | BODY MASS INDEX: 34.71 KG/M2 | HEIGHT: 67 IN | DIASTOLIC BLOOD PRESSURE: 69 MMHG

## 2019-01-01 VITALS
RESPIRATION RATE: 18 BRPM | TEMPERATURE: 97.9 F | DIASTOLIC BLOOD PRESSURE: 82 MMHG | HEIGHT: 67 IN | HEART RATE: 75 BPM | OXYGEN SATURATION: 89 % | BODY MASS INDEX: 32.18 KG/M2 | SYSTOLIC BLOOD PRESSURE: 119 MMHG | WEIGHT: 205.03 LBS

## 2019-01-01 VITALS
TEMPERATURE: 97.3 F | RESPIRATION RATE: 20 BRPM | HEART RATE: 74 BPM | HEIGHT: 67 IN | OXYGEN SATURATION: 92 % | SYSTOLIC BLOOD PRESSURE: 141 MMHG | WEIGHT: 232 LBS | BODY MASS INDEX: 36.41 KG/M2 | DIASTOLIC BLOOD PRESSURE: 65 MMHG

## 2019-01-01 VITALS
TEMPERATURE: 97.4 F | HEIGHT: 67 IN | BODY MASS INDEX: 32.41 KG/M2 | RESPIRATION RATE: 24 BRPM | OXYGEN SATURATION: 88 % | SYSTOLIC BLOOD PRESSURE: 103 MMHG | HEART RATE: 78 BPM | DIASTOLIC BLOOD PRESSURE: 72 MMHG | WEIGHT: 206.5 LBS

## 2019-01-01 DIAGNOSIS — L89.322 PRESSURE INJURY OF LEFT BUTTOCK, STAGE 2 (HCC): ICD-10-CM

## 2019-01-01 DIAGNOSIS — Z87.891 EX-CIGARETTE SMOKER: ICD-10-CM

## 2019-01-01 DIAGNOSIS — I10 ESSENTIAL HYPERTENSION: ICD-10-CM

## 2019-01-01 DIAGNOSIS — G47.33 OSA (OBSTRUCTIVE SLEEP APNEA): ICD-10-CM

## 2019-01-01 DIAGNOSIS — G47.19 EXCESSIVE DAYTIME SLEEPINESS: ICD-10-CM

## 2019-01-01 DIAGNOSIS — J18.9 PNEUMONIA OF LEFT LUNG DUE TO INFECTIOUS ORGANISM, UNSPECIFIED PART OF LUNG: ICD-10-CM

## 2019-01-01 DIAGNOSIS — R09.02 HYPOXIA: Primary | ICD-10-CM

## 2019-01-01 DIAGNOSIS — J96.22 ACUTE ON CHRONIC RESPIRATORY FAILURE WITH HYPOXIA AND HYPERCAPNIA (HCC): ICD-10-CM

## 2019-01-01 DIAGNOSIS — J44.9 CHRONIC OBSTRUCTIVE PULMONARY DISEASE, UNSPECIFIED COPD TYPE (HCC): ICD-10-CM

## 2019-01-01 DIAGNOSIS — J18.9 COMMUNITY ACQUIRED PNEUMONIA OF LEFT LOWER LOBE OF LUNG: Primary | ICD-10-CM

## 2019-01-01 DIAGNOSIS — L89.322 PRESSURE INJURY OF LEFT BUTTOCK, STAGE 2 (HCC): Primary | ICD-10-CM

## 2019-01-01 DIAGNOSIS — R06.02 SOB (SHORTNESS OF BREATH) ON EXERTION: ICD-10-CM

## 2019-01-01 DIAGNOSIS — E66.9 OBESITY (BMI 30-39.9): ICD-10-CM

## 2019-01-01 DIAGNOSIS — Z87.891 HX OF SMOKING: ICD-10-CM

## 2019-01-01 DIAGNOSIS — Z87.891 HX OF SMOKING: Primary | ICD-10-CM

## 2019-01-01 DIAGNOSIS — J96.21 ACUTE ON CHRONIC RESPIRATORY FAILURE WITH HYPOXIA AND HYPERCAPNIA (HCC): ICD-10-CM

## 2019-01-01 DIAGNOSIS — R06.00 DYSPNEA, UNSPECIFIED TYPE: ICD-10-CM

## 2019-01-01 LAB
ABO/RH: NORMAL
ADENOVIRUS DETECTION BY PCR: NOT DETECTED
ADENOVIRUS DETECTION BY PCR: NOT DETECTED
ALBUMIN SERPL-MCNC: 2.9 GM/DL (ref 3.4–5)
ALBUMIN SERPL-MCNC: 2.9 GM/DL (ref 3.4–5)
ALBUMIN SERPL-MCNC: 3 GM/DL (ref 3.4–5)
ALBUMIN SERPL-MCNC: 3.1 GM/DL (ref 3.4–5)
ALBUMIN SERPL-MCNC: 3.2 GM/DL (ref 3.4–5)
ALBUMIN SERPL-MCNC: 3.2 GM/DL (ref 3.4–5)
ALBUMIN SERPL-MCNC: 3.3 GM/DL (ref 3.4–5)
ALP BLD-CCNC: 56 IU/L (ref 40–129)
ALT SERPL-CCNC: 5 U/L (ref 10–40)
ANION GAP SERPL CALCULATED.3IONS-SCNC: 10 MMOL/L (ref 4–16)
ANION GAP SERPL CALCULATED.3IONS-SCNC: 11 MMOL/L (ref 4–16)
ANION GAP SERPL CALCULATED.3IONS-SCNC: 12 MMOL/L (ref 4–16)
ANION GAP SERPL CALCULATED.3IONS-SCNC: 13 MMOL/L (ref 4–16)
ANION GAP SERPL CALCULATED.3IONS-SCNC: 14 MMOL/L (ref 4–16)
ANION GAP SERPL CALCULATED.3IONS-SCNC: 14 MMOL/L (ref 4–16)
ANION GAP SERPL CALCULATED.3IONS-SCNC: 15 MMOL/L (ref 4–16)
ANION GAP SERPL CALCULATED.3IONS-SCNC: 17 MMOL/L (ref 4–16)
ANION GAP SERPL CALCULATED.3IONS-SCNC: 18 MMOL/L (ref 4–16)
ANION GAP SERPL CALCULATED.3IONS-SCNC: 18 MMOL/L (ref 4–16)
ANION GAP SERPL CALCULATED.3IONS-SCNC: 7 MMOL/L (ref 4–16)
ANTIBODY SCREEN: NEGATIVE
APTT: 32.4 SECONDS (ref 21.2–33)
APTT: 32.5 SECONDS (ref 21.2–33)
AST SERPL-CCNC: 12 IU/L (ref 15–37)
ATYPICAL LYMPHOCYTE ABSOLUTE COUNT: ABNORMAL
BACTERIA: NEGATIVE /HPF
BANDED NEUTROPHILS ABSOLUTE COUNT: 0.49 K/CU MM
BANDED NEUTROPHILS ABSOLUTE COUNT: 0.76 K/CU MM
BANDED NEUTROPHILS RELATIVE PERCENT: 3 % (ref 5–11)
BANDED NEUTROPHILS RELATIVE PERCENT: 4 % (ref 5–11)
BASE EXCESS MIXED: 12.6 (ref 0–1.2)
BASE EXCESS MIXED: 13.7 (ref 0–1.2)
BASE EXCESS MIXED: 6.1 (ref 0–1.2)
BASE EXCESS MIXED: 6.4 (ref 0–1.2)
BASE EXCESS MIXED: 7.9 (ref 0–1.2)
BASE EXCESS MIXED: 9.2 (ref 0–1.2)
BASE EXCESS MIXED: ABNORMAL (ref 0–1.2)
BASE EXCESS: ABNORMAL (ref 0–3.3)
BASOPHILIC STIPPLING: PRESENT
BASOPHILS ABSOLUTE: 0 K/CU MM
BASOPHILS ABSOLUTE: 0.1 K/CU MM
BASOPHILS ABSOLUTE: 0.2 K/CU MM
BASOPHILS RELATIVE PERCENT: 0.1 % (ref 0–1)
BASOPHILS RELATIVE PERCENT: 0.2 % (ref 0–1)
BASOPHILS RELATIVE PERCENT: 0.3 % (ref 0–1)
BASOPHILS RELATIVE PERCENT: 0.4 % (ref 0–1)
BASOPHILS RELATIVE PERCENT: 0.5 % (ref 0–1)
BASOPHILS RELATIVE PERCENT: 0.6 % (ref 0–1)
BASOPHILS RELATIVE PERCENT: 1 % (ref 0–1)
BILIRUB SERPL-MCNC: 0.3 MG/DL (ref 0–1)
BILIRUBIN URINE: NEGATIVE MG/DL
BLOOD, URINE: ABNORMAL
BORDETELLA PERTUSSIS PCR: NOT DETECTED
BORDETELLA PERTUSSIS PCR: NOT DETECTED
BUN BLDV-MCNC: 20 MG/DL (ref 6–23)
BUN BLDV-MCNC: 24 MG/DL (ref 6–23)
BUN BLDV-MCNC: 25 MG/DL (ref 6–23)
BUN BLDV-MCNC: 33 MG/DL (ref 6–23)
BUN BLDV-MCNC: 35 MG/DL (ref 6–23)
BUN BLDV-MCNC: 35 MG/DL (ref 6–23)
BUN BLDV-MCNC: 36 MG/DL (ref 6–23)
BUN BLDV-MCNC: 36 MG/DL (ref 6–23)
BUN BLDV-MCNC: 37 MG/DL (ref 6–23)
BUN BLDV-MCNC: 38 MG/DL (ref 6–23)
BUN BLDV-MCNC: 40 MG/DL (ref 6–23)
BUN BLDV-MCNC: 40 MG/DL (ref 6–23)
BUN BLDV-MCNC: 44 MG/DL (ref 6–23)
BUN BLDV-MCNC: 48 MG/DL (ref 6–23)
BUN BLDV-MCNC: 49 MG/DL (ref 6–23)
BUN BLDV-MCNC: 53 MG/DL (ref 6–23)
BUN BLDV-MCNC: 55 MG/DL (ref 6–23)
BUN BLDV-MCNC: 55 MG/DL (ref 6–23)
BUN BLDV-MCNC: 56 MG/DL (ref 6–23)
BUN BLDV-MCNC: 57 MG/DL (ref 6–23)
BUN BLDV-MCNC: 58 MG/DL (ref 6–23)
BUN BLDV-MCNC: 59 MG/DL (ref 6–23)
BUN BLDV-MCNC: 60 MG/DL (ref 6–23)
BUN BLDV-MCNC: 61 MG/DL (ref 6–23)
BUN BLDV-MCNC: 62 MG/DL (ref 6–23)
C-REACTIVE PROTEIN, HIGH SENSITIVITY: 196 MG/L
CALCIUM SERPL-MCNC: 8.2 MG/DL (ref 8.3–10.6)
CALCIUM SERPL-MCNC: 8.3 MG/DL (ref 8.3–10.6)
CALCIUM SERPL-MCNC: 8.3 MG/DL (ref 8.3–10.6)
CALCIUM SERPL-MCNC: 8.5 MG/DL (ref 8.3–10.6)
CALCIUM SERPL-MCNC: 8.6 MG/DL (ref 8.3–10.6)
CALCIUM SERPL-MCNC: 8.7 MG/DL (ref 8.3–10.6)
CALCIUM SERPL-MCNC: 8.8 MG/DL (ref 8.3–10.6)
CALCIUM SERPL-MCNC: 8.9 MG/DL (ref 8.3–10.6)
CALCIUM SERPL-MCNC: 9 MG/DL (ref 8.3–10.6)
CALCIUM SERPL-MCNC: 9.1 MG/DL (ref 8.3–10.6)
CALCIUM SERPL-MCNC: 9.1 MG/DL (ref 8.3–10.6)
CALCIUM SERPL-MCNC: 9.2 MG/DL (ref 8.3–10.6)
CALCIUM SERPL-MCNC: 9.2 MG/DL (ref 8.3–10.6)
CALCIUM SERPL-MCNC: 9.3 MG/DL (ref 8.3–10.6)
CALCIUM SERPL-MCNC: 9.6 MG/DL (ref 8.3–10.6)
CARBON MONOXIDE, BLOOD: 1.9 % (ref 0–5)
CHLAMYDOPHILA PNEUMONIA PCR: NOT DETECTED
CHLAMYDOPHILA PNEUMONIA PCR: NOT DETECTED
CHLORIDE BLD-SCNC: 100 MMOL/L (ref 99–110)
CHLORIDE BLD-SCNC: 101 MMOL/L (ref 99–110)
CHLORIDE BLD-SCNC: 102 MMOL/L (ref 99–110)
CHLORIDE BLD-SCNC: 87 MMOL/L (ref 99–110)
CHLORIDE BLD-SCNC: 89 MMOL/L (ref 99–110)
CHLORIDE BLD-SCNC: 89 MMOL/L (ref 99–110)
CHLORIDE BLD-SCNC: 90 MMOL/L (ref 99–110)
CHLORIDE BLD-SCNC: 90 MMOL/L (ref 99–110)
CHLORIDE BLD-SCNC: 91 MMOL/L (ref 99–110)
CHLORIDE BLD-SCNC: 92 MMOL/L (ref 99–110)
CHLORIDE BLD-SCNC: 93 MMOL/L (ref 99–110)
CHLORIDE BLD-SCNC: 93 MMOL/L (ref 99–110)
CHLORIDE BLD-SCNC: 94 MMOL/L (ref 99–110)
CHLORIDE BLD-SCNC: 96 MMOL/L (ref 99–110)
CHLORIDE BLD-SCNC: 97 MMOL/L (ref 99–110)
CHLORIDE BLD-SCNC: 98 MMOL/L (ref 99–110)
CHLORIDE BLD-SCNC: 98 MMOL/L (ref 99–110)
CHLORIDE BLD-SCNC: 99 MMOL/L (ref 99–110)
CHLORIDE BLD-SCNC: 99 MMOL/L (ref 99–110)
CHLORIDE URINE RANDOM: 50 MMOL/L (ref 43–210)
CHOLESTEROL: 100 MG/DL
CLARITY: CLEAR
CO2 CONTENT: 34 MMOL/L (ref 19–24)
CO2 CONTENT: 35 MMOL/L (ref 19–24)
CO2 CONTENT: 36.5 MMOL/L (ref 19–24)
CO2 CONTENT: 38 MMOL/L (ref 19–24)
CO2 CONTENT: 40.8 MMOL/L (ref 19–24)
CO2 CONTENT: 42.4 MMOL/L (ref 19–24)
CO2 CONTENT: 45.8 MMOL/L (ref 19–24)
CO2: 24 MMOL/L (ref 21–32)
CO2: 25 MMOL/L (ref 21–32)
CO2: 28 MMOL/L (ref 21–32)
CO2: 29 MMOL/L (ref 21–32)
CO2: 30 MMOL/L (ref 21–32)
CO2: 31 MMOL/L (ref 21–32)
CO2: 31 MMOL/L (ref 21–32)
CO2: 32 MMOL/L (ref 21–32)
CO2: 33 MMOL/L (ref 21–32)
CO2: 33 MMOL/L (ref 21–32)
CO2: 34 MMOL/L (ref 21–32)
CO2: 34 MMOL/L (ref 21–32)
CO2: 35 MMOL/L (ref 21–32)
CO2: 36 MMOL/L (ref 21–32)
CO2: 37 MMOL/L (ref 21–32)
CO2: 37 MMOL/L (ref 21–32)
CO2: 39 MMOL/L (ref 21–32)
CO2: 40 MMOL/L (ref 21–32)
COLOR: ABNORMAL
COMMENT: ABNORMAL
COMMENT: NORMAL
CORONAVIRUS 229E PCR: NOT DETECTED
CORONAVIRUS 229E PCR: NOT DETECTED
CORONAVIRUS HKU1 PCR: NOT DETECTED
CORONAVIRUS HKU1 PCR: NOT DETECTED
CORONAVIRUS NL63 PCR: NOT DETECTED
CORONAVIRUS NL63 PCR: NOT DETECTED
CORONAVIRUS OC43 PCR: NOT DETECTED
CORONAVIRUS OC43 PCR: NOT DETECTED
CREAT SERPL-MCNC: 1.2 MG/DL (ref 0.9–1.3)
CREAT SERPL-MCNC: 1.3 MG/DL (ref 0.9–1.3)
CREAT SERPL-MCNC: 1.3 MG/DL (ref 0.9–1.3)
CREAT SERPL-MCNC: 1.4 MG/DL (ref 0.9–1.3)
CREAT SERPL-MCNC: 1.4 MG/DL (ref 0.9–1.3)
CREAT SERPL-MCNC: 1.6 MG/DL (ref 0.9–1.3)
CREAT SERPL-MCNC: 1.6 MG/DL (ref 0.9–1.3)
CREAT SERPL-MCNC: 1.7 MG/DL (ref 0.9–1.3)
CREAT SERPL-MCNC: 1.8 MG/DL (ref 0.9–1.3)
CREAT SERPL-MCNC: 1.9 MG/DL (ref 0.9–1.3)
CREAT SERPL-MCNC: 2 MG/DL (ref 0.9–1.3)
CREAT SERPL-MCNC: 2.1 MG/DL (ref 0.9–1.3)
CREAT SERPL-MCNC: 2.2 MG/DL (ref 0.9–1.3)
CREAT SERPL-MCNC: 2.3 MG/DL (ref 0.9–1.3)
CREAT SERPL-MCNC: 2.3 MG/DL (ref 0.9–1.3)
CREATININE URINE: 48.1 MG/DL (ref 39–259)
CULTURE: ABNORMAL
CULTURE: NORMAL
DIFFERENTIAL TYPE: ABNORMAL
DOSE AMOUNT: NORMAL
DOSE AMOUNT: NORMAL
DOSE TIME: NORMAL
DOSE TIME: NORMAL
EKG ATRIAL RATE: 75 BPM
EKG ATRIAL RATE: 77 BPM
EKG ATRIAL RATE: 92 BPM
EKG ATRIAL RATE: 93 BPM
EKG DIAGNOSIS: NORMAL
EKG P AXIS: 32 DEGREES
EKG P AXIS: 62 DEGREES
EKG P AXIS: 63 DEGREES
EKG P AXIS: 63 DEGREES
EKG P-R INTERVAL: 154 MS
EKG P-R INTERVAL: 162 MS
EKG P-R INTERVAL: 180 MS
EKG P-R INTERVAL: 192 MS
EKG Q-T INTERVAL: 348 MS
EKG Q-T INTERVAL: 374 MS
EKG Q-T INTERVAL: 376 MS
EKG Q-T INTERVAL: 382 MS
EKG Q-T INTERVAL: 386 MS
EKG QRS DURATION: 72 MS
EKG QRS DURATION: 76 MS
EKG QRS DURATION: 78 MS
EKG QRS DURATION: 78 MS
EKG QRS DURATION: 82 MS
EKG QTC CALCULATION (BAZETT): 426 MS
EKG QTC CALCULATION (BAZETT): 428 MS
EKG QTC CALCULATION (BAZETT): 430 MS
EKG QTC CALCULATION (BAZETT): 436 MS
EKG QTC CALCULATION (BAZETT): 467 MS
EKG R AXIS: 14 DEGREES
EKG R AXIS: 25 DEGREES
EKG R AXIS: 33 DEGREES
EKG R AXIS: 45 DEGREES
EKG R AXIS: 54 DEGREES
EKG T AXIS: 20 DEGREES
EKG T AXIS: 25 DEGREES
EKG T AXIS: 35 DEGREES
EKG T AXIS: 36 DEGREES
EKG T AXIS: 43 DEGREES
EKG VENTRICULAR RATE: 75 BPM
EKG VENTRICULAR RATE: 77 BPM
EKG VENTRICULAR RATE: 79 BPM
EKG VENTRICULAR RATE: 92 BPM
EKG VENTRICULAR RATE: 93 BPM
EOSINOPHILS ABSOLUTE: 0 K/CU MM
EOSINOPHILS ABSOLUTE: 0.1 K/CU MM
EOSINOPHILS ABSOLUTE: 0.1 K/CU MM
EOSINOPHILS ABSOLUTE: 0.2 K/CU MM
EOSINOPHILS ABSOLUTE: 0.3 K/CU MM
EOSINOPHILS ABSOLUTE: 0.4 K/CU MM
EOSINOPHILS ABSOLUTE: 0.5 K/CU MM
EOSINOPHILS ABSOLUTE: 0.5 K/CU MM
EOSINOPHILS ABSOLUTE: 0.6 K/CU MM
EOSINOPHILS RELATIVE PERCENT: 0 % (ref 0–3)
EOSINOPHILS RELATIVE PERCENT: 0.1 % (ref 0–3)
EOSINOPHILS RELATIVE PERCENT: 0.6 % (ref 0–3)
EOSINOPHILS RELATIVE PERCENT: 0.9 % (ref 0–3)
EOSINOPHILS RELATIVE PERCENT: 1 % (ref 0–3)
EOSINOPHILS RELATIVE PERCENT: 1.2 % (ref 0–3)
EOSINOPHILS RELATIVE PERCENT: 1.2 % (ref 0–3)
EOSINOPHILS RELATIVE PERCENT: 2 % (ref 0–3)
EOSINOPHILS RELATIVE PERCENT: 2.1 % (ref 0–3)
EOSINOPHILS RELATIVE PERCENT: 2.1 % (ref 0–3)
EOSINOPHILS RELATIVE PERCENT: 2.5 % (ref 0–3)
EOSINOPHILS RELATIVE PERCENT: 2.5 % (ref 0–3)
EOSINOPHILS RELATIVE PERCENT: 2.6 % (ref 0–3)
EOSINOPHILS RELATIVE PERCENT: 2.6 % (ref 0–3)
EOSINOPHILS RELATIVE PERCENT: 3 % (ref 0–3)
EOSINOPHILS RELATIVE PERCENT: 3.1 % (ref 0–3)
EOSINOPHILS RELATIVE PERCENT: 3.3 % (ref 0–3)
EOSINOPHILS RELATIVE PERCENT: 3.6 % (ref 0–3)
EOSINOPHILS RELATIVE PERCENT: 3.9 % (ref 0–3)
ESTIMATED AVERAGE GLUCOSE: 154 MG/DL
FERRITIN: 62 NG/ML (ref 30–400)
FOLATE: 6 NG/ML (ref 3.1–17.5)
GFR AFRICAN AMERICAN: 33 ML/MIN/1.73M2
GFR AFRICAN AMERICAN: 33 ML/MIN/1.73M2
GFR AFRICAN AMERICAN: 35 ML/MIN/1.73M2
GFR AFRICAN AMERICAN: 37 ML/MIN/1.73M2
GFR AFRICAN AMERICAN: 39 ML/MIN/1.73M2
GFR AFRICAN AMERICAN: 41 ML/MIN/1.73M2
GFR AFRICAN AMERICAN: 44 ML/MIN/1.73M2
GFR AFRICAN AMERICAN: 47 ML/MIN/1.73M2
GFR AFRICAN AMERICAN: 50 ML/MIN/1.73M2
GFR AFRICAN AMERICAN: 50 ML/MIN/1.73M2
GFR AFRICAN AMERICAN: 58 ML/MIN/1.73M2
GFR AFRICAN AMERICAN: 58 ML/MIN/1.73M2
GFR AFRICAN AMERICAN: >60 ML/MIN/1.73M2
GFR NON-AFRICAN AMERICAN: 27 ML/MIN/1.73M2
GFR NON-AFRICAN AMERICAN: 27 ML/MIN/1.73M2
GFR NON-AFRICAN AMERICAN: 29 ML/MIN/1.73M2
GFR NON-AFRICAN AMERICAN: 30 ML/MIN/1.73M2
GFR NON-AFRICAN AMERICAN: 32 ML/MIN/1.73M2
GFR NON-AFRICAN AMERICAN: 34 ML/MIN/1.73M2
GFR NON-AFRICAN AMERICAN: 36 ML/MIN/1.73M2
GFR NON-AFRICAN AMERICAN: 39 ML/MIN/1.73M2
GFR NON-AFRICAN AMERICAN: 41 ML/MIN/1.73M2
GFR NON-AFRICAN AMERICAN: 41 ML/MIN/1.73M2
GFR NON-AFRICAN AMERICAN: 48 ML/MIN/1.73M2
GFR NON-AFRICAN AMERICAN: 48 ML/MIN/1.73M2
GFR NON-AFRICAN AMERICAN: 52 ML/MIN/1.73M2
GFR NON-AFRICAN AMERICAN: 52 ML/MIN/1.73M2
GFR NON-AFRICAN AMERICAN: 58 ML/MIN/1.73M2
GLUCOSE BLD-MCNC: 108 MG/DL (ref 70–99)
GLUCOSE BLD-MCNC: 112 MG/DL (ref 70–99)
GLUCOSE BLD-MCNC: 116 MG/DL (ref 70–99)
GLUCOSE BLD-MCNC: 120 MG/DL (ref 70–99)
GLUCOSE BLD-MCNC: 120 MG/DL (ref 70–99)
GLUCOSE BLD-MCNC: 121 MG/DL (ref 70–99)
GLUCOSE BLD-MCNC: 122 MG/DL (ref 70–99)
GLUCOSE BLD-MCNC: 122 MG/DL (ref 70–99)
GLUCOSE BLD-MCNC: 123 MG/DL (ref 70–99)
GLUCOSE BLD-MCNC: 124 MG/DL (ref 70–99)
GLUCOSE BLD-MCNC: 125 MG/DL (ref 70–99)
GLUCOSE BLD-MCNC: 126 MG/DL (ref 70–99)
GLUCOSE BLD-MCNC: 129 MG/DL (ref 70–99)
GLUCOSE BLD-MCNC: 131 MG/DL (ref 70–99)
GLUCOSE BLD-MCNC: 132 MG/DL (ref 70–99)
GLUCOSE BLD-MCNC: 134 MG/DL (ref 70–99)
GLUCOSE BLD-MCNC: 136 MG/DL (ref 70–99)
GLUCOSE BLD-MCNC: 136 MG/DL (ref 70–99)
GLUCOSE BLD-MCNC: 138 MG/DL (ref 70–99)
GLUCOSE BLD-MCNC: 142 MG/DL (ref 70–99)
GLUCOSE BLD-MCNC: 142 MG/DL (ref 70–99)
GLUCOSE BLD-MCNC: 144 MG/DL (ref 70–99)
GLUCOSE BLD-MCNC: 145 MG/DL (ref 70–99)
GLUCOSE BLD-MCNC: 145 MG/DL (ref 70–99)
GLUCOSE BLD-MCNC: 147 MG/DL (ref 70–99)
GLUCOSE BLD-MCNC: 149 MG/DL (ref 70–99)
GLUCOSE BLD-MCNC: 150 MG/DL (ref 70–99)
GLUCOSE BLD-MCNC: 151 MG/DL (ref 70–99)
GLUCOSE BLD-MCNC: 152 MG/DL (ref 70–99)
GLUCOSE BLD-MCNC: 156 MG/DL (ref 70–99)
GLUCOSE BLD-MCNC: 159 MG/DL (ref 70–99)
GLUCOSE BLD-MCNC: 159 MG/DL (ref 70–99)
GLUCOSE BLD-MCNC: 162 MG/DL (ref 70–99)
GLUCOSE BLD-MCNC: 163 MG/DL (ref 70–99)
GLUCOSE BLD-MCNC: 165 MG/DL (ref 70–99)
GLUCOSE BLD-MCNC: 168 MG/DL (ref 70–99)
GLUCOSE BLD-MCNC: 172 MG/DL (ref 70–99)
GLUCOSE BLD-MCNC: 174 MG/DL (ref 70–99)
GLUCOSE BLD-MCNC: 175 MG/DL (ref 70–99)
GLUCOSE BLD-MCNC: 175 MG/DL (ref 70–99)
GLUCOSE BLD-MCNC: 178 MG/DL (ref 70–99)
GLUCOSE BLD-MCNC: 183 MG/DL (ref 70–99)
GLUCOSE BLD-MCNC: 187 MG/DL (ref 70–99)
GLUCOSE BLD-MCNC: 188 MG/DL (ref 70–99)
GLUCOSE BLD-MCNC: 190 MG/DL (ref 70–99)
GLUCOSE BLD-MCNC: 204 MG/DL (ref 70–99)
GLUCOSE BLD-MCNC: 208 MG/DL (ref 70–99)
GLUCOSE BLD-MCNC: 210 MG/DL (ref 70–99)
GLUCOSE BLD-MCNC: 212 MG/DL (ref 70–99)
GLUCOSE BLD-MCNC: 212 MG/DL (ref 70–99)
GLUCOSE BLD-MCNC: 224 MG/DL (ref 70–99)
GLUCOSE BLD-MCNC: 229 MG/DL (ref 70–99)
GLUCOSE BLD-MCNC: 232 MG/DL (ref 70–99)
GLUCOSE BLD-MCNC: 244 MG/DL (ref 70–99)
GLUCOSE, URINE: NEGATIVE MG/DL
GRAM SMEAR: ABNORMAL
HBA1C MFR BLD: 7 % (ref 4.2–6.3)
HCO3 ARTERIAL: 32.4 MMOL/L (ref 18–23)
HCO3 ARTERIAL: 33.2 MMOL/L (ref 18–23)
HCO3 ARTERIAL: 34.8 MMOL/L (ref 18–23)
HCO3 ARTERIAL: 38.9 MMOL/L (ref 18–23)
HCO3 ARTERIAL: 40.5 MMOL/L (ref 18–23)
HCO3 ARTERIAL: 43.8 MMOL/L (ref 18–23)
HCO3 VENOUS: 36.1 MMOL/L (ref 19–25)
HCT VFR BLD CALC: 27.2 % (ref 42–52)
HCT VFR BLD CALC: 28 % (ref 42–52)
HCT VFR BLD CALC: 28.6 % (ref 42–52)
HCT VFR BLD CALC: 28.8 % (ref 42–52)
HCT VFR BLD CALC: 29.8 % (ref 42–52)
HCT VFR BLD CALC: 31.2 % (ref 42–52)
HCT VFR BLD CALC: 31.4 % (ref 42–52)
HCT VFR BLD CALC: 32 % (ref 42–52)
HCT VFR BLD CALC: 32.3 % (ref 42–52)
HCT VFR BLD CALC: 32.7 % (ref 42–52)
HCT VFR BLD CALC: 32.9 % (ref 42–52)
HCT VFR BLD CALC: 33.1 % (ref 42–52)
HCT VFR BLD CALC: 33.1 % (ref 42–52)
HCT VFR BLD CALC: 33.5 % (ref 42–52)
HCT VFR BLD CALC: 33.7 % (ref 42–52)
HCT VFR BLD CALC: 33.7 % (ref 42–52)
HCT VFR BLD CALC: 33.9 % (ref 42–52)
HCT VFR BLD CALC: 34.2 % (ref 42–52)
HCT VFR BLD CALC: 34.7 % (ref 42–52)
HCT VFR BLD CALC: 34.8 % (ref 42–52)
HCT VFR BLD CALC: 35.1 % (ref 42–52)
HCT VFR BLD CALC: 35.1 % (ref 42–52)
HCT VFR BLD CALC: 35.5 % (ref 42–52)
HCT VFR BLD CALC: 36.5 % (ref 42–52)
HCT VFR BLD CALC: 38.2 % (ref 42–52)
HDLC SERPL-MCNC: 43 MG/DL
HEMOGLOBIN: 10 GM/DL (ref 13.5–18)
HEMOGLOBIN: 10 GM/DL (ref 13.5–18)
HEMOGLOBIN: 10.1 GM/DL (ref 13.5–18)
HEMOGLOBIN: 10.2 GM/DL (ref 13.5–18)
HEMOGLOBIN: 10.3 GM/DL (ref 13.5–18)
HEMOGLOBIN: 10.4 GM/DL (ref 13.5–18)
HEMOGLOBIN: 10.6 GM/DL (ref 13.5–18)
HEMOGLOBIN: 10.7 GM/DL (ref 13.5–18)
HEMOGLOBIN: 11 GM/DL (ref 13.5–18)
HEMOGLOBIN: 8.1 GM/DL (ref 13.5–18)
HEMOGLOBIN: 8.3 GM/DL (ref 13.5–18)
HEMOGLOBIN: 8.5 GM/DL (ref 13.5–18)
HEMOGLOBIN: 8.6 GM/DL (ref 13.5–18)
HEMOGLOBIN: 8.7 GM/DL (ref 13.5–18)
HEMOGLOBIN: 9.4 GM/DL (ref 13.5–18)
HEMOGLOBIN: 9.4 GM/DL (ref 13.5–18)
HEMOGLOBIN: 9.5 GM/DL (ref 13.5–18)
HEMOGLOBIN: 9.8 GM/DL (ref 13.5–18)
HEMOGLOBIN: 9.9 GM/DL (ref 13.5–18)
HIGH SENSITIVE C-REACTIVE PROTEIN: 54.2 MG/L
HUMAN METAPNEUMOVIRUS PCR: NOT DETECTED
HUMAN METAPNEUMOVIRUS PCR: NOT DETECTED
HYALINE CASTS: 2 /LPF
IMMATURE NEUTROPHIL %: 0.5 % (ref 0–0.43)
IMMATURE NEUTROPHIL %: 0.6 % (ref 0–0.43)
IMMATURE NEUTROPHIL %: 0.7 % (ref 0–0.43)
IMMATURE NEUTROPHIL %: 0.8 % (ref 0–0.43)
IMMATURE NEUTROPHIL %: 0.9 % (ref 0–0.43)
IMMATURE NEUTROPHIL %: 1 % (ref 0–0.43)
IMMATURE NEUTROPHIL %: 1.1 % (ref 0–0.43)
IMMATURE NEUTROPHIL %: 1.1 % (ref 0–0.43)
IMMATURE NEUTROPHIL %: 1.2 % (ref 0–0.43)
IMMATURE NEUTROPHIL %: 1.5 % (ref 0–0.43)
IMMATURE NEUTROPHIL %: 1.6 % (ref 0–0.43)
IMMATURE NEUTROPHIL %: 1.7 % (ref 0–0.43)
IMMATURE NEUTROPHIL %: 1.7 % (ref 0–0.43)
IMMATURE NEUTROPHIL %: 1.8 % (ref 0–0.43)
IMMATURE NEUTROPHIL %: 1.8 % (ref 0–0.43)
IMMATURE NEUTROPHIL %: 1.9 % (ref 0–0.43)
INFLUENZA A BY PCR: NOT DETECTED
INFLUENZA A BY PCR: NOT DETECTED
INFLUENZA A H1 (2009) PCR: NOT DETECTED
INFLUENZA A H1 (2009) PCR: NOT DETECTED
INFLUENZA A H1 PANDEMIC PCR: NOT DETECTED
INFLUENZA A H1 PANDEMIC PCR: NOT DETECTED
INFLUENZA A H3 PCR: NOT DETECTED
INFLUENZA A H3 PCR: NOT DETECTED
INFLUENZA B BY PCR: NOT DETECTED
INFLUENZA B BY PCR: NOT DETECTED
INR BLD: 0.99 INDEX
INR BLD: 1.06 INDEX
IRON: 27 UG/DL (ref 59–158)
KETONES, URINE: NEGATIVE MG/DL
LACTATE: 1.6 MMOL/L (ref 0.4–2)
LDL CHOLESTEROL DIRECT: 45 MG/DL
LEGIONELLA URINARY AG: NEGATIVE
LEGIONELLA URINARY AG: NEGATIVE
LEUKOCYTE ESTERASE, URINE: NEGATIVE
LV EF: 58 %
LVEF MODALITY: NORMAL
LYMPHOCYTES ABSOLUTE: 0.8 K/CU MM
LYMPHOCYTES ABSOLUTE: 0.8 K/CU MM
LYMPHOCYTES ABSOLUTE: 1 K/CU MM
LYMPHOCYTES ABSOLUTE: 1 K/CU MM
LYMPHOCYTES ABSOLUTE: 1.2 K/CU MM
LYMPHOCYTES ABSOLUTE: 1.4 K/CU MM
LYMPHOCYTES ABSOLUTE: 1.5 K/CU MM
LYMPHOCYTES ABSOLUTE: 1.6 K/CU MM
LYMPHOCYTES ABSOLUTE: 1.7 K/CU MM
LYMPHOCYTES ABSOLUTE: 1.7 K/CU MM
LYMPHOCYTES ABSOLUTE: 1.8 K/CU MM
LYMPHOCYTES ABSOLUTE: 1.9 K/CU MM
LYMPHOCYTES ABSOLUTE: 2.4 K/CU MM
LYMPHOCYTES RELATIVE PERCENT: 10 % (ref 24–44)
LYMPHOCYTES RELATIVE PERCENT: 10.1 % (ref 24–44)
LYMPHOCYTES RELATIVE PERCENT: 10.6 % (ref 24–44)
LYMPHOCYTES RELATIVE PERCENT: 10.7 % (ref 24–44)
LYMPHOCYTES RELATIVE PERCENT: 11.4 % (ref 24–44)
LYMPHOCYTES RELATIVE PERCENT: 12.4 % (ref 24–44)
LYMPHOCYTES RELATIVE PERCENT: 13.3 % (ref 24–44)
LYMPHOCYTES RELATIVE PERCENT: 13.7 % (ref 24–44)
LYMPHOCYTES RELATIVE PERCENT: 15 % (ref 24–44)
LYMPHOCYTES RELATIVE PERCENT: 15.1 % (ref 24–44)
LYMPHOCYTES RELATIVE PERCENT: 15.7 % (ref 24–44)
LYMPHOCYTES RELATIVE PERCENT: 6.1 % (ref 24–44)
LYMPHOCYTES RELATIVE PERCENT: 6.2 % (ref 24–44)
LYMPHOCYTES RELATIVE PERCENT: 6.6 % (ref 24–44)
LYMPHOCYTES RELATIVE PERCENT: 7.2 % (ref 24–44)
LYMPHOCYTES RELATIVE PERCENT: 7.8 % (ref 24–44)
LYMPHOCYTES RELATIVE PERCENT: 8 % (ref 24–44)
LYMPHOCYTES RELATIVE PERCENT: 8.4 % (ref 24–44)
LYMPHOCYTES RELATIVE PERCENT: 8.6 % (ref 24–44)
LYMPHOCYTES RELATIVE PERCENT: 8.9 % (ref 24–44)
LYMPHOCYTES RELATIVE PERCENT: 9.5 % (ref 24–44)
LYMPHOCYTES RELATIVE PERCENT: 9.6 % (ref 24–44)
LYMPHOCYTES RELATIVE PERCENT: 9.8 % (ref 24–44)
Lab: ABNORMAL
Lab: ABNORMAL
Lab: NORMAL
MAGNESIUM: 1.7 MG/DL (ref 1.8–2.4)
MAGNESIUM: 1.8 MG/DL (ref 1.8–2.4)
MAGNESIUM: 2 MG/DL (ref 1.8–2.4)
MAGNESIUM: 2.1 MG/DL (ref 1.8–2.4)
MAGNESIUM: 2.2 MG/DL (ref 1.8–2.4)
MAGNESIUM: 2.3 MG/DL (ref 1.8–2.4)
MCH RBC QN AUTO: 27.3 PG (ref 27–31)
MCH RBC QN AUTO: 27.4 PG (ref 27–31)
MCH RBC QN AUTO: 27.5 PG (ref 27–31)
MCH RBC QN AUTO: 27.5 PG (ref 27–31)
MCH RBC QN AUTO: 27.6 PG (ref 27–31)
MCH RBC QN AUTO: 27.7 PG (ref 27–31)
MCH RBC QN AUTO: 27.8 PG (ref 27–31)
MCH RBC QN AUTO: 27.9 PG (ref 27–31)
MCH RBC QN AUTO: 28 PG (ref 27–31)
MCH RBC QN AUTO: 28.1 PG (ref 27–31)
MCH RBC QN AUTO: 28.1 PG (ref 27–31)
MCH RBC QN AUTO: 28.2 PG (ref 27–31)
MCH RBC QN AUTO: 28.3 PG (ref 27–31)
MCH RBC QN AUTO: 28.3 PG (ref 27–31)
MCH RBC QN AUTO: 28.4 PG (ref 27–31)
MCH RBC QN AUTO: 28.5 PG (ref 27–31)
MCH RBC QN AUTO: 28.9 PG (ref 27–31)
MCHC RBC AUTO-ENTMCNC: 28.2 % (ref 32–36)
MCHC RBC AUTO-ENTMCNC: 28.7 % (ref 32–36)
MCHC RBC AUTO-ENTMCNC: 28.8 % (ref 32–36)
MCHC RBC AUTO-ENTMCNC: 28.9 % (ref 32–36)
MCHC RBC AUTO-ENTMCNC: 28.9 % (ref 32–36)
MCHC RBC AUTO-ENTMCNC: 29 % (ref 32–36)
MCHC RBC AUTO-ENTMCNC: 29.2 % (ref 32–36)
MCHC RBC AUTO-ENTMCNC: 29.2 % (ref 32–36)
MCHC RBC AUTO-ENTMCNC: 29.5 % (ref 32–36)
MCHC RBC AUTO-ENTMCNC: 29.6 % (ref 32–36)
MCHC RBC AUTO-ENTMCNC: 29.6 % (ref 32–36)
MCHC RBC AUTO-ENTMCNC: 29.7 % (ref 32–36)
MCHC RBC AUTO-ENTMCNC: 29.9 % (ref 32–36)
MCHC RBC AUTO-ENTMCNC: 30.1 % (ref 32–36)
MCHC RBC AUTO-ENTMCNC: 30.3 % (ref 32–36)
MCHC RBC AUTO-ENTMCNC: 30.4 % (ref 32–36)
MCHC RBC AUTO-ENTMCNC: 30.4 % (ref 32–36)
MCHC RBC AUTO-ENTMCNC: 30.5 % (ref 32–36)
MCV RBC AUTO: 90.4 FL (ref 78–100)
MCV RBC AUTO: 91 FL (ref 78–100)
MCV RBC AUTO: 91.5 FL (ref 78–100)
MCV RBC AUTO: 91.6 FL (ref 78–100)
MCV RBC AUTO: 91.7 FL (ref 78–100)
MCV RBC AUTO: 92.1 FL (ref 78–100)
MCV RBC AUTO: 92.1 FL (ref 78–100)
MCV RBC AUTO: 92.2 FL (ref 78–100)
MCV RBC AUTO: 92.3 FL (ref 78–100)
MCV RBC AUTO: 92.5 FL (ref 78–100)
MCV RBC AUTO: 92.7 FL (ref 78–100)
MCV RBC AUTO: 92.8 FL (ref 78–100)
MCV RBC AUTO: 92.9 FL (ref 78–100)
MCV RBC AUTO: 93.4 FL (ref 78–100)
MCV RBC AUTO: 93.6 FL (ref 78–100)
MCV RBC AUTO: 93.8 FL (ref 78–100)
MCV RBC AUTO: 94.3 FL (ref 78–100)
MCV RBC AUTO: 94.3 FL (ref 78–100)
MCV RBC AUTO: 94.6 FL (ref 78–100)
MCV RBC AUTO: 94.9 FL (ref 78–100)
MCV RBC AUTO: 95.9 FL (ref 78–100)
MCV RBC AUTO: 96.9 FL (ref 78–100)
MCV RBC AUTO: 97.1 FL (ref 78–100)
MCV RBC AUTO: 97.6 FL (ref 78–100)
MCV RBC AUTO: 99 FL (ref 78–100)
MCV RBC AUTO: 99.5 FL (ref 78–100)
METHEMOGLOBIN ARTERIAL: 1.2 %
MONOCYTES ABSOLUTE: 0.5 K/CU MM
MONOCYTES ABSOLUTE: 0.8 K/CU MM
MONOCYTES ABSOLUTE: 0.9 K/CU MM
MONOCYTES ABSOLUTE: 1 K/CU MM
MONOCYTES ABSOLUTE: 1.1 K/CU MM
MONOCYTES ABSOLUTE: 1.1 K/CU MM
MONOCYTES ABSOLUTE: 1.2 K/CU MM
MONOCYTES ABSOLUTE: 1.3 K/CU MM
MONOCYTES ABSOLUTE: 1.4 K/CU MM
MONOCYTES ABSOLUTE: 1.5 K/CU MM
MONOCYTES ABSOLUTE: 1.6 K/CU MM
MONOCYTES ABSOLUTE: 1.7 K/CU MM
MONOCYTES ABSOLUTE: 2 K/CU MM
MONOCYTES ABSOLUTE: 2.1 K/CU MM
MONOCYTES ABSOLUTE: 2.2 K/CU MM
MONOCYTES RELATIVE PERCENT: 10 % (ref 0–4)
MONOCYTES RELATIVE PERCENT: 10.1 % (ref 0–4)
MONOCYTES RELATIVE PERCENT: 10.4 % (ref 0–4)
MONOCYTES RELATIVE PERCENT: 11.7 % (ref 0–4)
MONOCYTES RELATIVE PERCENT: 12.4 % (ref 0–4)
MONOCYTES RELATIVE PERCENT: 4 % (ref 0–4)
MONOCYTES RELATIVE PERCENT: 4.5 % (ref 0–4)
MONOCYTES RELATIVE PERCENT: 6 % (ref 0–4)
MONOCYTES RELATIVE PERCENT: 6.8 % (ref 0–4)
MONOCYTES RELATIVE PERCENT: 7.7 % (ref 0–4)
MONOCYTES RELATIVE PERCENT: 7.7 % (ref 0–4)
MONOCYTES RELATIVE PERCENT: 8.3 % (ref 0–4)
MONOCYTES RELATIVE PERCENT: 8.5 % (ref 0–4)
MONOCYTES RELATIVE PERCENT: 9.1 % (ref 0–4)
MONOCYTES RELATIVE PERCENT: 9.2 % (ref 0–4)
MONOCYTES RELATIVE PERCENT: 9.3 % (ref 0–4)
MONOCYTES RELATIVE PERCENT: 9.3 % (ref 0–4)
MONOCYTES RELATIVE PERCENT: 9.4 % (ref 0–4)
MONOCYTES RELATIVE PERCENT: 9.4 % (ref 0–4)
MONOCYTES RELATIVE PERCENT: 9.6 % (ref 0–4)
MONOCYTES RELATIVE PERCENT: 9.7 % (ref 0–4)
MONOCYTES RELATIVE PERCENT: 9.9 % (ref 0–4)
MUCUS: ABNORMAL HPF
MYCOPLASMA PNEUMONIAE PCR: NOT DETECTED
MYCOPLASMA PNEUMONIAE PCR: NOT DETECTED
NITRITE URINE, QUANTITATIVE: NEGATIVE
NUCLEATED RBC %: 0 %
O2 SAT, VEN: 91.8 % (ref 50–70)
O2 SATURATION: 69.2 % (ref 96–97)
O2 SATURATION: 83.5 % (ref 96–97)
O2 SATURATION: 85.9 % (ref 96–97)
O2 SATURATION: 86 % (ref 96–97)
O2 SATURATION: 89.3 % (ref 96–97)
O2 SATURATION: 95.3 % (ref 96–97)
PARAINFLUENZA 1 PCR: NOT DETECTED
PARAINFLUENZA 1 PCR: NOT DETECTED
PARAINFLUENZA 2 PCR: NOT DETECTED
PARAINFLUENZA 2 PCR: NOT DETECTED
PARAINFLUENZA 3 PCR: NOT DETECTED
PARAINFLUENZA 3 PCR: NOT DETECTED
PARAINFLUENZA 4 PCR: NOT DETECTED
PARAINFLUENZA 4 PCR: NOT DETECTED
PCO2 ARTERIAL: 51.7 MMHG (ref 32–45)
PCO2 ARTERIAL: 57 MMHG (ref 32–45)
PCO2 ARTERIAL: 58.5 MMHG (ref 32–45)
PCO2 ARTERIAL: 60.6 MMHG (ref 32–45)
PCO2 ARTERIAL: 61.4 MMHG (ref 32–45)
PCO2 ARTERIAL: 66 MMHG (ref 32–45)
PCO2, VEN: 60.3 MMHG (ref 38–52)
PCT TRANSFERRIN: 11 % (ref 10–44)
PDW BLD-RTO: 13.4 % (ref 11.7–14.9)
PDW BLD-RTO: 13.6 % (ref 11.7–14.9)
PDW BLD-RTO: 13.7 % (ref 11.7–14.9)
PDW BLD-RTO: 13.8 % (ref 11.7–14.9)
PDW BLD-RTO: 13.9 % (ref 11.7–14.9)
PDW BLD-RTO: 14 % (ref 11.7–14.9)
PDW BLD-RTO: 14 % (ref 11.7–14.9)
PDW BLD-RTO: 14.1 % (ref 11.7–14.9)
PDW BLD-RTO: 14.1 % (ref 11.7–14.9)
PDW BLD-RTO: 14.2 % (ref 11.7–14.9)
PDW BLD-RTO: 14.3 % (ref 11.7–14.9)
PDW BLD-RTO: 14.4 % (ref 11.7–14.9)
PDW BLD-RTO: 14.4 % (ref 11.7–14.9)
PDW BLD-RTO: 14.6 % (ref 11.7–14.9)
PDW BLD-RTO: 14.6 % (ref 11.7–14.9)
PDW BLD-RTO: 14.7 % (ref 11.7–14.9)
PDW BLD-RTO: 14.8 % (ref 11.7–14.9)
PH BLOOD: 7.36 (ref 7.34–7.45)
PH BLOOD: 7.39 (ref 7.34–7.45)
PH BLOOD: 7.4 (ref 7.34–7.45)
PH BLOOD: 7.42 (ref 7.34–7.45)
PH BLOOD: 7.43 (ref 7.34–7.45)
PH BLOOD: 7.43 (ref 7.34–7.45)
PH VENOUS: 7.39 (ref 7.32–7.42)
PH, URINE: 9 (ref 5–8)
PHOSPHORUS: 3.5 MG/DL (ref 2.5–4.9)
PHOSPHORUS: 3.7 MG/DL (ref 2.5–4.9)
PHOSPHORUS: 3.9 MG/DL (ref 2.5–4.9)
PHOSPHORUS: 4.2 MG/DL (ref 2.5–4.9)
PHOSPHORUS: 4.3 MG/DL (ref 2.5–4.9)
PHOSPHORUS: 4.3 MG/DL (ref 2.5–4.9)
PHOSPHORUS: 4.4 MG/DL (ref 2.5–4.9)
PHOSPHORUS: 4.5 MG/DL (ref 2.5–4.9)
PLATELET # BLD: 262 K/CU MM (ref 140–440)
PLATELET # BLD: 274 K/CU MM (ref 140–440)
PLATELET # BLD: 280 K/CU MM (ref 140–440)
PLATELET # BLD: 280 K/CU MM (ref 140–440)
PLATELET # BLD: 288 K/CU MM (ref 140–440)
PLATELET # BLD: 290 K/CU MM (ref 140–440)
PLATELET # BLD: 291 K/CU MM (ref 140–440)
PLATELET # BLD: 291 K/CU MM (ref 140–440)
PLATELET # BLD: 292 K/CU MM (ref 140–440)
PLATELET # BLD: 292 K/CU MM (ref 140–440)
PLATELET # BLD: 294 K/CU MM (ref 140–440)
PLATELET # BLD: 294 K/CU MM (ref 140–440)
PLATELET # BLD: 298 K/CU MM (ref 140–440)
PLATELET # BLD: 301 K/CU MM (ref 140–440)
PLATELET # BLD: 301 K/CU MM (ref 140–440)
PLATELET # BLD: 302 K/CU MM (ref 140–440)
PLATELET # BLD: 304 K/CU MM (ref 140–440)
PLATELET # BLD: 306 K/CU MM (ref 140–440)
PLATELET # BLD: 307 K/CU MM (ref 140–440)
PLATELET # BLD: 310 K/CU MM (ref 140–440)
PLATELET # BLD: 311 K/CU MM (ref 140–440)
PLATELET # BLD: 314 K/CU MM (ref 140–440)
PLATELET # BLD: 314 K/CU MM (ref 140–440)
PLATELET # BLD: 320 K/CU MM (ref 140–440)
PLATELET # BLD: 321 K/CU MM (ref 140–440)
PLATELET # BLD: 328 K/CU MM (ref 140–440)
PLT MORPHOLOGY: ABNORMAL
PMV BLD AUTO: 10 FL (ref 7.5–11.1)
PMV BLD AUTO: 10.1 FL (ref 7.5–11.1)
PMV BLD AUTO: 10.1 FL (ref 7.5–11.1)
PMV BLD AUTO: 10.2 FL (ref 7.5–11.1)
PMV BLD AUTO: 10.3 FL (ref 7.5–11.1)
PMV BLD AUTO: 10.4 FL (ref 7.5–11.1)
PMV BLD AUTO: 10.7 FL (ref 7.5–11.1)
PMV BLD AUTO: 8.7 FL (ref 7.5–11.1)
PMV BLD AUTO: 9.4 FL (ref 7.5–11.1)
PMV BLD AUTO: 9.4 FL (ref 7.5–11.1)
PMV BLD AUTO: 9.5 FL (ref 7.5–11.1)
PMV BLD AUTO: 9.6 FL (ref 7.5–11.1)
PMV BLD AUTO: 9.8 FL (ref 7.5–11.1)
PMV BLD AUTO: 9.9 FL (ref 7.5–11.1)
PO2 ARTERIAL: 36.8 MMHG (ref 75–100)
PO2 ARTERIAL: 48.8 MMHG (ref 75–100)
PO2 ARTERIAL: 50 MMHG (ref 75–100)
PO2 ARTERIAL: 52.6 MMHG (ref 75–100)
PO2 ARTERIAL: 59.3 MMHG (ref 75–100)
PO2 ARTERIAL: 82.9 MMHG (ref 75–100)
PO2, VEN: 66.5 MMHG (ref 28–48)
POLYCHROMASIA: ABNORMAL
POTASSIUM SERPL-SCNC: 3.2 MMOL/L (ref 3.5–5.1)
POTASSIUM SERPL-SCNC: 3.3 MMOL/L (ref 3.5–5.1)
POTASSIUM SERPL-SCNC: 3.3 MMOL/L (ref 3.5–5.1)
POTASSIUM SERPL-SCNC: 3.4 MMOL/L (ref 3.5–5.1)
POTASSIUM SERPL-SCNC: 3.5 MMOL/L (ref 3.5–5.1)
POTASSIUM SERPL-SCNC: 3.6 MMOL/L (ref 3.5–5.1)
POTASSIUM SERPL-SCNC: 3.6 MMOL/L (ref 3.5–5.1)
POTASSIUM SERPL-SCNC: 3.7 MMOL/L (ref 3.5–5.1)
POTASSIUM SERPL-SCNC: 3.7 MMOL/L (ref 3.5–5.1)
POTASSIUM SERPL-SCNC: 3.8 MMOL/L (ref 3.5–5.1)
POTASSIUM SERPL-SCNC: 3.9 MMOL/L (ref 3.5–5.1)
POTASSIUM SERPL-SCNC: 3.9 MMOL/L (ref 3.5–5.1)
POTASSIUM SERPL-SCNC: 4.1 MMOL/L (ref 3.5–5.1)
POTASSIUM SERPL-SCNC: 4.2 MMOL/L (ref 3.5–5.1)
POTASSIUM SERPL-SCNC: 4.5 MMOL/L (ref 3.5–5.1)
POTASSIUM SERPL-SCNC: 4.6 MMOL/L (ref 3.5–5.1)
POTASSIUM SERPL-SCNC: 4.7 MMOL/L (ref 3.5–5.1)
POTASSIUM SERPL-SCNC: 4.8 MMOL/L (ref 3.5–5.1)
POTASSIUM SERPL-SCNC: 4.9 MMOL/L (ref 3.5–5.1)
POTASSIUM SERPL-SCNC: 5.4 MMOL/L (ref 3.5–5.1)
POTASSIUM SERPL-SCNC: ABNORMAL MMOL/L (ref 3.5–5.1)
POTASSIUM SERPL-SCNC: ABNORMAL MMOL/L (ref 3.5–5.1)
POTASSIUM, UR: 42.8 MMOL/L (ref 22–119)
PRO-BNP: 1231 PG/ML
PRO-BNP: 1270 PG/ML
PRO-BNP: 1573 PG/ML
PRO-BNP: 2157 PG/ML
PRO-BNP: 316.8 PG/ML
PRO-BNP: 434.5 PG/ML
PRO-BNP: 617.8 PG/ML
PROCALCITONIN: 0.23
PROCALCITONIN: 0.26
PROCALCITONIN: 0.3
PROCALCITONIN: 0.33
PROCALCITONIN: 0.36
PROCALCITONIN: 0.4
PROCALCITONIN: 0.41
PROCALCITONIN: 0.48
PROCALCITONIN: 0.5
PROCALCITONIN: 0.55
PROT/CREAT RATIO, UR: ABNORMAL
PROTEIN UA: 100 MG/DL
PROTHROMBIN TIME: 11.3 SECONDS (ref 9.12–12.5)
PROTHROMBIN TIME: 12.1 SECONDS (ref 9.12–12.5)
RAPID INFLUENZA  B AGN: NEGATIVE
RAPID INFLUENZA A AGN: NEGATIVE
RBC # BLD: 2.94 M/CU MM (ref 4.6–6.2)
RBC # BLD: 2.97 M/CU MM (ref 4.6–6.2)
RBC # BLD: 3.1 M/CU MM (ref 4.6–6.2)
RBC # BLD: 3.12 M/CU MM (ref 4.6–6.2)
RBC # BLD: 3.16 M/CU MM (ref 4.6–6.2)
RBC # BLD: 3.35 M/CU MM (ref 4.6–6.2)
RBC # BLD: 3.41 M/CU MM (ref 4.6–6.2)
RBC # BLD: 3.43 M/CU MM (ref 4.6–6.2)
RBC # BLD: 3.43 M/CU MM (ref 4.6–6.2)
RBC # BLD: 3.45 M/CU MM (ref 4.6–6.2)
RBC # BLD: 3.45 M/CU MM (ref 4.6–6.2)
RBC # BLD: 3.47 M/CU MM (ref 4.6–6.2)
RBC # BLD: 3.5 M/CU MM (ref 4.6–6.2)
RBC # BLD: 3.53 M/CU MM (ref 4.6–6.2)
RBC # BLD: 3.57 M/CU MM (ref 4.6–6.2)
RBC # BLD: 3.59 M/CU MM (ref 4.6–6.2)
RBC # BLD: 3.61 M/CU MM (ref 4.6–6.2)
RBC # BLD: 3.63 M/CU MM (ref 4.6–6.2)
RBC # BLD: 3.66 M/CU MM (ref 4.6–6.2)
RBC # BLD: 3.67 M/CU MM (ref 4.6–6.2)
RBC # BLD: 3.67 M/CU MM (ref 4.6–6.2)
RBC # BLD: 3.68 M/CU MM (ref 4.6–6.2)
RBC # BLD: 3.68 M/CU MM (ref 4.6–6.2)
RBC # BLD: 3.76 M/CU MM (ref 4.6–6.2)
RBC # BLD: 3.8 M/CU MM (ref 4.6–6.2)
RBC # BLD: 3.81 M/CU MM (ref 4.6–6.2)
RBC # BLD: 3.86 M/CU MM (ref 4.6–6.2)
RBC # BLD: 3.87 M/CU MM (ref 4.6–6.2)
RBC URINE: 36 /HPF (ref 0–3)
RHINOVIRUS ENTEROVIRUS PCR: NOT DETECTED
RHINOVIRUS ENTEROVIRUS PCR: NOT DETECTED
RSV PCR: NOT DETECTED
RSV PCR: NOT DETECTED
SEGMENTED NEUTROPHILS ABSOLUTE COUNT: 10.9 K/CU MM
SEGMENTED NEUTROPHILS ABSOLUTE COUNT: 11.3 K/CU MM
SEGMENTED NEUTROPHILS ABSOLUTE COUNT: 11.4 K/CU MM
SEGMENTED NEUTROPHILS ABSOLUTE COUNT: 12 K/CU MM
SEGMENTED NEUTROPHILS ABSOLUTE COUNT: 12 K/CU MM
SEGMENTED NEUTROPHILS ABSOLUTE COUNT: 12.1 K/CU MM
SEGMENTED NEUTROPHILS ABSOLUTE COUNT: 12.2 K/CU MM
SEGMENTED NEUTROPHILS ABSOLUTE COUNT: 12.3 K/CU MM
SEGMENTED NEUTROPHILS ABSOLUTE COUNT: 12.6 K/CU MM
SEGMENTED NEUTROPHILS ABSOLUTE COUNT: 13.1 K/CU MM
SEGMENTED NEUTROPHILS ABSOLUTE COUNT: 13.2 K/CU MM
SEGMENTED NEUTROPHILS ABSOLUTE COUNT: 13.4 K/CU MM
SEGMENTED NEUTROPHILS ABSOLUTE COUNT: 13.5 K/CU MM
SEGMENTED NEUTROPHILS ABSOLUTE COUNT: 15.4 K/CU MM
SEGMENTED NEUTROPHILS ABSOLUTE COUNT: 15.5 K/CU MM
SEGMENTED NEUTROPHILS ABSOLUTE COUNT: 16.6 K/CU MM
SEGMENTED NEUTROPHILS ABSOLUTE COUNT: 18.4 K/CU MM
SEGMENTED NEUTROPHILS ABSOLUTE COUNT: 6.7 K/CU MM
SEGMENTED NEUTROPHILS ABSOLUTE COUNT: 8 K/CU MM
SEGMENTED NEUTROPHILS ABSOLUTE COUNT: 8.4 K/CU MM
SEGMENTED NEUTROPHILS ABSOLUTE COUNT: 8.4 K/CU MM
SEGMENTED NEUTROPHILS ABSOLUTE COUNT: 8.8 K/CU MM
SEGMENTED NEUTROPHILS ABSOLUTE COUNT: 8.9 K/CU MM
SEGMENTED NEUTROPHILS ABSOLUTE COUNT: 9.2 K/CU MM
SEGMENTED NEUTROPHILS ABSOLUTE COUNT: 9.6 K/CU MM
SEGMENTED NEUTROPHILS RELATIVE PERCENT: 67.8 % (ref 36–66)
SEGMENTED NEUTROPHILS RELATIVE PERCENT: 69 % (ref 36–66)
SEGMENTED NEUTROPHILS RELATIVE PERCENT: 72.2 % (ref 36–66)
SEGMENTED NEUTROPHILS RELATIVE PERCENT: 73.7 % (ref 36–66)
SEGMENTED NEUTROPHILS RELATIVE PERCENT: 74 % (ref 36–66)
SEGMENTED NEUTROPHILS RELATIVE PERCENT: 74.5 % (ref 36–66)
SEGMENTED NEUTROPHILS RELATIVE PERCENT: 75 % (ref 36–66)
SEGMENTED NEUTROPHILS RELATIVE PERCENT: 75.5 % (ref 36–66)
SEGMENTED NEUTROPHILS RELATIVE PERCENT: 76.8 % (ref 36–66)
SEGMENTED NEUTROPHILS RELATIVE PERCENT: 76.8 % (ref 36–66)
SEGMENTED NEUTROPHILS RELATIVE PERCENT: 77.1 % (ref 36–66)
SEGMENTED NEUTROPHILS RELATIVE PERCENT: 77.2 % (ref 36–66)
SEGMENTED NEUTROPHILS RELATIVE PERCENT: 77.3 % (ref 36–66)
SEGMENTED NEUTROPHILS RELATIVE PERCENT: 77.6 % (ref 36–66)
SEGMENTED NEUTROPHILS RELATIVE PERCENT: 78.1 % (ref 36–66)
SEGMENTED NEUTROPHILS RELATIVE PERCENT: 78.8 % (ref 36–66)
SEGMENTED NEUTROPHILS RELATIVE PERCENT: 79.4 % (ref 36–66)
SEGMENTED NEUTROPHILS RELATIVE PERCENT: 79.4 % (ref 36–66)
SEGMENTED NEUTROPHILS RELATIVE PERCENT: 80.6 % (ref 36–66)
SEGMENTED NEUTROPHILS RELATIVE PERCENT: 81 % (ref 36–66)
SEGMENTED NEUTROPHILS RELATIVE PERCENT: 81.1 % (ref 36–66)
SEGMENTED NEUTROPHILS RELATIVE PERCENT: 81.9 % (ref 36–66)
SEGMENTED NEUTROPHILS RELATIVE PERCENT: 82.7 % (ref 36–66)
SEGMENTED NEUTROPHILS RELATIVE PERCENT: 86.3 % (ref 36–66)
SEGMENTED NEUTROPHILS RELATIVE PERCENT: 86.9 % (ref 36–66)
SODIUM BLD-SCNC: 134 MMOL/L (ref 135–145)
SODIUM BLD-SCNC: 134 MMOL/L (ref 135–145)
SODIUM BLD-SCNC: 136 MMOL/L (ref 135–145)
SODIUM BLD-SCNC: 138 MMOL/L (ref 135–145)
SODIUM BLD-SCNC: 138 MMOL/L (ref 135–145)
SODIUM BLD-SCNC: 139 MMOL/L (ref 135–145)
SODIUM BLD-SCNC: 140 MMOL/L (ref 135–145)
SODIUM BLD-SCNC: 141 MMOL/L (ref 135–145)
SODIUM BLD-SCNC: 141 MMOL/L (ref 135–145)
SODIUM BLD-SCNC: 142 MMOL/L (ref 135–145)
SODIUM BLD-SCNC: 143 MMOL/L (ref 135–145)
SODIUM BLD-SCNC: 143 MMOL/L (ref 135–145)
SODIUM BLD-SCNC: 144 MMOL/L (ref 135–145)
SODIUM BLD-SCNC: 144 MMOL/L (ref 135–145)
SODIUM BLD-SCNC: 145 MMOL/L (ref 135–145)
SODIUM BLD-SCNC: 146 MMOL/L (ref 135–145)
SODIUM URINE: 108 MMOL/L (ref 35–167)
SOURCE, BLOOD GAS: ABNORMAL
SPECIFIC GRAVITY UA: 1.01 (ref 1–1.03)
SPECIMEN: ABNORMAL
SPECIMEN: ABNORMAL
SPECIMEN: NORMAL
STOMATOCYTES: ABNORMAL
STREP PNEUMONIAE ANTIGEN: NORMAL
T4 FREE: 1.18 NG/DL (ref 0.9–1.8)
TOTAL IMMATURE NEUTOROPHIL: 0.07 K/CU MM
TOTAL IMMATURE NEUTOROPHIL: 0.09 K/CU MM
TOTAL IMMATURE NEUTOROPHIL: 0.1 K/CU MM
TOTAL IMMATURE NEUTOROPHIL: 0.11 K/CU MM
TOTAL IMMATURE NEUTOROPHIL: 0.13 K/CU MM
TOTAL IMMATURE NEUTOROPHIL: 0.14 K/CU MM
TOTAL IMMATURE NEUTOROPHIL: 0.15 K/CU MM
TOTAL IMMATURE NEUTOROPHIL: 0.17 K/CU MM
TOTAL IMMATURE NEUTOROPHIL: 0.17 K/CU MM
TOTAL IMMATURE NEUTOROPHIL: 0.18 K/CU MM
TOTAL IMMATURE NEUTOROPHIL: 0.2 K/CU MM
TOTAL IMMATURE NEUTOROPHIL: 0.22 K/CU MM
TOTAL IMMATURE NEUTOROPHIL: 0.23 K/CU MM
TOTAL IMMATURE NEUTOROPHIL: 0.24 K/CU MM
TOTAL IMMATURE NEUTOROPHIL: 0.25 K/CU MM
TOTAL IMMATURE NEUTOROPHIL: 0.27 K/CU MM
TOTAL IMMATURE NEUTOROPHIL: 0.27 K/CU MM
TOTAL IMMATURE NEUTOROPHIL: 0.29 K/CU MM
TOTAL IMMATURE NEUTOROPHIL: 0.33 K/CU MM
TOTAL IRON BINDING CAPACITY: 253 UG/DL (ref 250–450)
TOTAL NUCLEATED RBC: 0 K/CU MM
TOTAL PROTEIN: 7.2 GM/DL (ref 6.4–8.2)
TRICHOMONAS: ABNORMAL /HPF
TRIGL SERPL-MCNC: 87 MG/DL
TROPONIN T: 0.04 NG/ML
TROPONIN T: 0.05 NG/ML
TROPONIN T: 0.05 NG/ML
TROPONIN T: 0.07 NG/ML
TSH HIGH SENSITIVITY: 3.76 UIU/ML (ref 0.27–4.2)
UNSATURATED IRON BINDING CAPACITY: 226 UG/DL (ref 110–370)
URINE TOTAL PROTEIN: 53 MG/DL
UROBILINOGEN, URINE: NORMAL MG/DL (ref 0.2–1)
VANCOMYCIN RANDOM: 12.7 UG/ML
VANCOMYCIN RANDOM: 13.4 UG/ML
VANCOMYCIN RANDOM: NORMAL UG/ML
VANCOMYCIN RANDOM: NORMAL UG/ML
VITAMIN B-12: 543.7 PG/ML (ref 211–911)
WBC # BLD: 10.2 K/CU MM (ref 4–10.5)
WBC # BLD: 11.2 K/CU MM (ref 4–10.5)
WBC # BLD: 11.3 K/CU MM (ref 4–10.5)
WBC # BLD: 11.7 K/CU MM (ref 4–10.5)
WBC # BLD: 11.9 K/CU MM (ref 4–10.5)
WBC # BLD: 12.2 K/CU MM (ref 4–10.5)
WBC # BLD: 12.3 K/CU MM (ref 4–10.5)
WBC # BLD: 12.5 K/CU MM (ref 4–10.5)
WBC # BLD: 13.2 K/CU MM (ref 4–10.5)
WBC # BLD: 14.1 K/CU MM (ref 4–10.5)
WBC # BLD: 14.7 K/CU MM (ref 4–10.5)
WBC # BLD: 15.6 K/CU MM (ref 4–10.5)
WBC # BLD: 15.7 K/CU MM (ref 4–10.5)
WBC # BLD: 15.8 K/CU MM (ref 4–10.5)
WBC # BLD: 15.8 K/CU MM (ref 4–10.5)
WBC # BLD: 15.9 K/CU MM (ref 4–10.5)
WBC # BLD: 16.3 K/CU MM (ref 4–10.5)
WBC # BLD: 16.3 K/CU MM (ref 4–10.5)
WBC # BLD: 17 K/CU MM (ref 4–10.5)
WBC # BLD: 17.1 K/CU MM (ref 4–10.5)
WBC # BLD: 17.2 K/CU MM (ref 4–10.5)
WBC # BLD: 19.1 K/CU MM (ref 4–10.5)
WBC # BLD: 19.7 K/CU MM (ref 4–10.5)
WBC # BLD: 20.5 K/CU MM (ref 4–10.5)
WBC # BLD: 22.5 K/CU MM (ref 4–10.5)
WBC # BLD: 24.9 K/CU MM (ref 4–10.5)
WBC # BLD: 9.6 K/CU MM (ref 4–10.5)
WBC # BLD: 9.9 K/CU MM (ref 4–10.5)
WBC # BLD: ABNORMAL 10*3/UL
WBC UA: 2 /HPF (ref 0–2)

## 2019-01-01 PROCEDURE — 80048 BASIC METABOLIC PNL TOTAL CA: CPT

## 2019-01-01 PROCEDURE — 2580000003 HC RX 258: Performed by: EMERGENCY MEDICINE

## 2019-01-01 PROCEDURE — 3430000000 HC RX DIAGNOSTIC RADIOPHARMACEUTICAL: Performed by: INTERNAL MEDICINE

## 2019-01-01 PROCEDURE — 87798 DETECT AGENT NOS DNA AMP: CPT

## 2019-01-01 PROCEDURE — 99221 1ST HOSP IP/OBS SF/LOW 40: CPT | Performed by: SURGERY

## 2019-01-01 PROCEDURE — 6370000000 HC RX 637 (ALT 250 FOR IP): Performed by: FAMILY MEDICINE

## 2019-01-01 PROCEDURE — 87804 INFLUENZA ASSAY W/OPTIC: CPT

## 2019-01-01 PROCEDURE — 6370000000 HC RX 637 (ALT 250 FOR IP): Performed by: INTERNAL MEDICINE

## 2019-01-01 PROCEDURE — 85007 BL SMEAR W/DIFF WBC COUNT: CPT

## 2019-01-01 PROCEDURE — 6360000002 HC RX W HCPCS: Performed by: FAMILY MEDICINE

## 2019-01-01 PROCEDURE — 2580000003 HC RX 258: Performed by: NURSE PRACTITIONER

## 2019-01-01 PROCEDURE — 94660 CPAP INITIATION&MGMT: CPT

## 2019-01-01 PROCEDURE — 82962 GLUCOSE BLOOD TEST: CPT

## 2019-01-01 PROCEDURE — 71045 X-RAY EXAM CHEST 1 VIEW: CPT

## 2019-01-01 PROCEDURE — 85025 COMPLETE CBC W/AUTO DIFF WBC: CPT

## 2019-01-01 PROCEDURE — 94761 N-INVAS EAR/PLS OXIMETRY MLT: CPT

## 2019-01-01 PROCEDURE — 2580000003 HC RX 258: Performed by: HOSPITALIST

## 2019-01-01 PROCEDURE — G0378 HOSPITAL OBSERVATION PER HR: HCPCS

## 2019-01-01 PROCEDURE — 85027 COMPLETE CBC AUTOMATED: CPT

## 2019-01-01 PROCEDURE — 6360000002 HC RX W HCPCS: Performed by: ANESTHESIOLOGY

## 2019-01-01 PROCEDURE — 97166 OT EVAL MOD COMPLEX 45 MIN: CPT

## 2019-01-01 PROCEDURE — 94640 AIRWAY INHALATION TREATMENT: CPT

## 2019-01-01 PROCEDURE — 86901 BLOOD TYPING SEROLOGIC RH(D): CPT

## 2019-01-01 PROCEDURE — 82746 ASSAY OF FOLIC ACID SERUM: CPT

## 2019-01-01 PROCEDURE — 2709999900 HC NON-CHARGEABLE SUPPLY

## 2019-01-01 PROCEDURE — 6370000000 HC RX 637 (ALT 250 FOR IP): Performed by: NURSE PRACTITIONER

## 2019-01-01 PROCEDURE — 83540 ASSAY OF IRON: CPT

## 2019-01-01 PROCEDURE — 93880 EXTRACRANIAL BILAT STUDY: CPT

## 2019-01-01 PROCEDURE — 2060000000 HC ICU INTERMEDIATE R&B

## 2019-01-01 PROCEDURE — 36247 INS CATH ABD/L-EXT ART 3RD: CPT

## 2019-01-01 PROCEDURE — 6360000002 HC RX W HCPCS: Performed by: NURSE PRACTITIONER

## 2019-01-01 PROCEDURE — 6360000002 HC RX W HCPCS: Performed by: PHYSICIAN ASSISTANT

## 2019-01-01 PROCEDURE — 2140000000 HC CCU INTERMEDIATE R&B

## 2019-01-01 PROCEDURE — 6370000000 HC RX 637 (ALT 250 FOR IP): Performed by: PHYSICIAN ASSISTANT

## 2019-01-01 PROCEDURE — 36415 COLL VENOUS BLD VENIPUNCTURE: CPT

## 2019-01-01 PROCEDURE — 96365 THER/PROPH/DIAG IV INF INIT: CPT

## 2019-01-01 PROCEDURE — 6360000002 HC RX W HCPCS: Performed by: EMERGENCY MEDICINE

## 2019-01-01 PROCEDURE — 2700000000 HC OXYGEN THERAPY PER DAY

## 2019-01-01 PROCEDURE — 80069 RENAL FUNCTION PANEL: CPT

## 2019-01-01 PROCEDURE — 6360000004 HC RX CONTRAST MEDICATION

## 2019-01-01 PROCEDURE — 84145 PROCALCITONIN (PCT): CPT

## 2019-01-01 PROCEDURE — 87581 M.PNEUMON DNA AMP PROBE: CPT

## 2019-01-01 PROCEDURE — 6370000000 HC RX 637 (ALT 250 FOR IP): Performed by: HOSPITALIST

## 2019-01-01 PROCEDURE — 85730 THROMBOPLASTIN TIME PARTIAL: CPT

## 2019-01-01 PROCEDURE — 99213 OFFICE O/P EST LOW 20 MIN: CPT

## 2019-01-01 PROCEDURE — 3700000000 HC ANESTHESIA ATTENDED CARE: Performed by: SURGERY

## 2019-01-01 PROCEDURE — 83735 ASSAY OF MAGNESIUM: CPT

## 2019-01-01 PROCEDURE — 36600 WITHDRAWAL OF ARTERIAL BLOOD: CPT

## 2019-01-01 PROCEDURE — 2580000003 HC RX 258: Performed by: FAMILY MEDICINE

## 2019-01-01 PROCEDURE — 83880 ASSAY OF NATRIURETIC PEPTIDE: CPT

## 2019-01-01 PROCEDURE — 2100000000 HC CCU R&B

## 2019-01-01 PROCEDURE — 82803 BLOOD GASES ANY COMBINATION: CPT

## 2019-01-01 PROCEDURE — 99232 SBSQ HOSP IP/OBS MODERATE 35: CPT | Performed by: INTERNAL MEDICINE

## 2019-01-01 PROCEDURE — 6370000000 HC RX 637 (ALT 250 FOR IP): Performed by: ANESTHESIOLOGY

## 2019-01-01 PROCEDURE — 97530 THERAPEUTIC ACTIVITIES: CPT

## 2019-01-01 PROCEDURE — 6360000002 HC RX W HCPCS

## 2019-01-01 PROCEDURE — 2580000003 HC RX 258: Performed by: THORACIC SURGERY (CARDIOTHORACIC VASCULAR SURGERY)

## 2019-01-01 PROCEDURE — 6360000002 HC RX W HCPCS: Performed by: SURGERY

## 2019-01-01 PROCEDURE — 6370000000 HC RX 637 (ALT 250 FOR IP)

## 2019-01-01 PROCEDURE — 99221 1ST HOSP IP/OBS SF/LOW 40: CPT | Performed by: NURSE PRACTITIONER

## 2019-01-01 PROCEDURE — 6360000002 HC RX W HCPCS: Performed by: HOSPITALIST

## 2019-01-01 PROCEDURE — 93005 ELECTROCARDIOGRAM TRACING: CPT | Performed by: EMERGENCY MEDICINE

## 2019-01-01 PROCEDURE — 87899 AGENT NOS ASSAY W/OPTIC: CPT

## 2019-01-01 PROCEDURE — 93308 TTE F-UP OR LMTD: CPT

## 2019-01-01 PROCEDURE — 82805 BLOOD GASES W/O2 SATURATION: CPT

## 2019-01-01 PROCEDURE — 93010 ELECTROCARDIOGRAM REPORT: CPT | Performed by: INTERNAL MEDICINE

## 2019-01-01 PROCEDURE — 84484 ASSAY OF TROPONIN QUANT: CPT

## 2019-01-01 PROCEDURE — 97110 THERAPEUTIC EXERCISES: CPT

## 2019-01-01 PROCEDURE — 87040 BLOOD CULTURE FOR BACTERIA: CPT

## 2019-01-01 PROCEDURE — 97116 GAIT TRAINING THERAPY: CPT

## 2019-01-01 PROCEDURE — 94667 MNPJ CHEST WALL 1ST: CPT

## 2019-01-01 PROCEDURE — 93005 ELECTROCARDIOGRAM TRACING: CPT | Performed by: SURGERY

## 2019-01-01 PROCEDURE — 87486 CHLMYD PNEUM DNA AMP PROBE: CPT

## 2019-01-01 PROCEDURE — 51702 INSERT TEMP BLADDER CATH: CPT

## 2019-01-01 PROCEDURE — 2709999900 HC NON-CHARGEABLE SUPPLY: Performed by: SURGERY

## 2019-01-01 PROCEDURE — 71046 X-RAY EXAM CHEST 2 VIEWS: CPT

## 2019-01-01 PROCEDURE — 2580000003 HC RX 258: Performed by: SURGERY

## 2019-01-01 PROCEDURE — 83550 IRON BINDING TEST: CPT

## 2019-01-01 PROCEDURE — 83721 ASSAY OF BLOOD LIPOPROTEIN: CPT

## 2019-01-01 PROCEDURE — 94150 VITAL CAPACITY TEST: CPT

## 2019-01-01 PROCEDURE — 87449 NOS EACH ORGANISM AG IA: CPT

## 2019-01-01 PROCEDURE — 87633 RESP VIRUS 12-25 TARGETS: CPT

## 2019-01-01 PROCEDURE — 86140 C-REACTIVE PROTEIN: CPT

## 2019-01-01 PROCEDURE — 99231 SBSQ HOSP IP/OBS SF/LOW 25: CPT | Performed by: SURGERY

## 2019-01-01 PROCEDURE — C1769 GUIDE WIRE: HCPCS

## 2019-01-01 PROCEDURE — 6360000002 HC RX W HCPCS: Performed by: INTERNAL MEDICINE

## 2019-01-01 PROCEDURE — 99304 1ST NF CARE SF/LOW MDM 25: CPT | Performed by: NURSE PRACTITIONER

## 2019-01-01 PROCEDURE — 94668 MNPJ CHEST WALL SBSQ: CPT

## 2019-01-01 PROCEDURE — 87205 SMEAR GRAM STAIN: CPT

## 2019-01-01 PROCEDURE — 51798 US URINE CAPACITY MEASURE: CPT

## 2019-01-01 PROCEDURE — 1200000000 HC SEMI PRIVATE

## 2019-01-01 PROCEDURE — 84439 ASSAY OF FREE THYROXINE: CPT

## 2019-01-01 PROCEDURE — 6360000002 HC RX W HCPCS: Performed by: NURSE ANESTHETIST, CERTIFIED REGISTERED

## 2019-01-01 PROCEDURE — 87086 URINE CULTURE/COLONY COUNT: CPT

## 2019-01-01 PROCEDURE — 2580000003 HC RX 258

## 2019-01-01 PROCEDURE — 99308 SBSQ NF CARE LOW MDM 20: CPT | Performed by: NURSE PRACTITIONER

## 2019-01-01 PROCEDURE — 71250 CT THORAX DX C-: CPT

## 2019-01-01 PROCEDURE — 2500000003 HC RX 250 WO HCPCS

## 2019-01-01 PROCEDURE — 99232 SBSQ HOSP IP/OBS MODERATE 35: CPT | Performed by: SURGERY

## 2019-01-01 PROCEDURE — 97162 PT EVAL MOD COMPLEX 30 MIN: CPT

## 2019-01-01 PROCEDURE — 96372 THER/PROPH/DIAG INJ SC/IM: CPT

## 2019-01-01 PROCEDURE — 99213 OFFICE O/P EST LOW 20 MIN: CPT | Performed by: INTERNAL MEDICINE

## 2019-01-01 PROCEDURE — 04UK0KZ SUPPLEMENT RIGHT FEMORAL ARTERY WITH NONAUTOLOGOUS TISSUE SUBSTITUTE, OPEN APPROACH: ICD-10-PCS | Performed by: SURGERY

## 2019-01-01 PROCEDURE — 2500000003 HC RX 250 WO HCPCS: Performed by: NURSE ANESTHETIST, CERTIFIED REGISTERED

## 2019-01-01 PROCEDURE — 99211 OFF/OP EST MAY X REQ PHY/QHP: CPT

## 2019-01-01 PROCEDURE — 99285 EMERGENCY DEPT VISIT HI MDM: CPT

## 2019-01-01 PROCEDURE — 87073 CULTURE BACTERIA ANAEROBIC: CPT

## 2019-01-01 PROCEDURE — 87070 CULTURE OTHR SPECIMN AEROBIC: CPT

## 2019-01-01 PROCEDURE — 83036 HEMOGLOBIN GLYCOSYLATED A1C: CPT

## 2019-01-01 PROCEDURE — 83605 ASSAY OF LACTIC ACID: CPT

## 2019-01-01 PROCEDURE — 3600000004 HC SURGERY LEVEL 4 BASE: Performed by: SURGERY

## 2019-01-01 PROCEDURE — 75710 ARTERY X-RAYS ARM/LEG: CPT

## 2019-01-01 PROCEDURE — C1768 GRAFT, VASCULAR: HCPCS | Performed by: SURGERY

## 2019-01-01 PROCEDURE — 3600000014 HC SURGERY LEVEL 4 ADDTL 15MIN: Performed by: SURGERY

## 2019-01-01 PROCEDURE — 3700000001 HC ADD 15 MINUTES (ANESTHESIA): Performed by: SURGERY

## 2019-01-01 PROCEDURE — 87186 SC STD MICRODIL/AGAR DIL: CPT

## 2019-01-01 PROCEDURE — 94200 LUNG FUNCTION TEST (MBC/MVV): CPT

## 2019-01-01 PROCEDURE — 72141 MRI NECK SPINE W/O DYE: CPT

## 2019-01-01 PROCEDURE — 84300 ASSAY OF URINE SODIUM: CPT

## 2019-01-01 PROCEDURE — 2580000003 HC RX 258: Performed by: PHYSICIAN ASSISTANT

## 2019-01-01 PROCEDURE — 84443 ASSAY THYROID STIM HORMONE: CPT

## 2019-01-01 PROCEDURE — 82728 ASSAY OF FERRITIN: CPT

## 2019-01-01 PROCEDURE — 76775 US EXAM ABDO BACK WALL LIM: CPT

## 2019-01-01 PROCEDURE — 84520 ASSAY OF UREA NITROGEN: CPT

## 2019-01-01 PROCEDURE — 82570 ASSAY OF URINE CREATININE: CPT

## 2019-01-01 PROCEDURE — C1894 INTRO/SHEATH, NON-LASER: HCPCS

## 2019-01-01 PROCEDURE — 99231 SBSQ HOSP IP/OBS SF/LOW 25: CPT | Performed by: NURSE PRACTITIONER

## 2019-01-01 PROCEDURE — 97535 SELF CARE MNGMENT TRAINING: CPT

## 2019-01-01 PROCEDURE — 96367 TX/PROPH/DG ADDL SEQ IV INF: CPT

## 2019-01-01 PROCEDURE — A9540 TC99M MAA: HCPCS | Performed by: INTERNAL MEDICINE

## 2019-01-01 PROCEDURE — 93005 ELECTROCARDIOGRAM TRACING: CPT | Performed by: NURSE PRACTITIONER

## 2019-01-01 PROCEDURE — 6370000000 HC RX 637 (ALT 250 FOR IP): Performed by: SURGERY

## 2019-01-01 PROCEDURE — 84156 ASSAY OF PROTEIN URINE: CPT

## 2019-01-01 PROCEDURE — 7100000000 HC PACU RECOVERY - FIRST 15 MIN: Performed by: SURGERY

## 2019-01-01 PROCEDURE — 86900 BLOOD TYPING SEROLOGIC ABO: CPT

## 2019-01-01 PROCEDURE — 80061 LIPID PANEL: CPT

## 2019-01-01 PROCEDURE — 75625 CONTRAST EXAM ABDOMINL AORTA: CPT

## 2019-01-01 PROCEDURE — 81001 URINALYSIS AUTO W/SCOPE: CPT

## 2019-01-01 PROCEDURE — 88300 SURGICAL PATH GROSS: CPT

## 2019-01-01 PROCEDURE — 86141 C-REACTIVE PROTEIN HS: CPT

## 2019-01-01 PROCEDURE — 80202 ASSAY OF VANCOMYCIN: CPT

## 2019-01-01 PROCEDURE — 04CK0ZZ EXTIRPATION OF MATTER FROM RIGHT FEMORAL ARTERY, OPEN APPROACH: ICD-10-PCS | Performed by: SURGERY

## 2019-01-01 PROCEDURE — 70551 MRI BRAIN STEM W/O DYE: CPT

## 2019-01-01 PROCEDURE — 6370000000 HC RX 637 (ALT 250 FOR IP): Performed by: THORACIC SURGERY (CARDIOTHORACIC VASCULAR SURGERY)

## 2019-01-01 PROCEDURE — 6370000000 HC RX 637 (ALT 250 FOR IP): Performed by: EMERGENCY MEDICINE

## 2019-01-01 PROCEDURE — 2580000003 HC RX 258: Performed by: NURSE ANESTHETIST, CERTIFIED REGISTERED

## 2019-01-01 PROCEDURE — 93005 ELECTROCARDIOGRAM TRACING: CPT | Performed by: INTERNAL MEDICINE

## 2019-01-01 PROCEDURE — 99222 1ST HOSP IP/OBS MODERATE 55: CPT | Performed by: INTERNAL MEDICINE

## 2019-01-01 PROCEDURE — A9539 TC99M PENTETATE: HCPCS | Performed by: INTERNAL MEDICINE

## 2019-01-01 PROCEDURE — 93306 TTE W/DOPPLER COMPLETE: CPT

## 2019-01-01 PROCEDURE — 85610 PROTHROMBIN TIME: CPT

## 2019-01-01 PROCEDURE — 82565 ASSAY OF CREATININE: CPT

## 2019-01-01 PROCEDURE — 82607 VITAMIN B-12: CPT

## 2019-01-01 PROCEDURE — 86850 RBC ANTIBODY SCREEN: CPT

## 2019-01-01 PROCEDURE — 80053 COMPREHEN METABOLIC PANEL: CPT

## 2019-01-01 PROCEDURE — 84133 ASSAY OF URINE POTASSIUM: CPT

## 2019-01-01 PROCEDURE — 87071 CULTURE AEROBIC QUANT OTHER: CPT

## 2019-01-01 PROCEDURE — 72192 CT PELVIS W/O DYE: CPT

## 2019-01-01 PROCEDURE — 78582 LUNG VENTILAT&PERFUS IMAGING: CPT

## 2019-01-01 PROCEDURE — 2500000003 HC RX 250 WO HCPCS: Performed by: NURSE PRACTITIONER

## 2019-01-01 PROCEDURE — 6370000000 HC RX 637 (ALT 250 FOR IP): Performed by: NURSE ANESTHETIST, CERTIFIED REGISTERED

## 2019-01-01 PROCEDURE — 82436 ASSAY OF URINE CHLORIDE: CPT

## 2019-01-01 PROCEDURE — 70450 CT HEAD/BRAIN W/O DYE: CPT

## 2019-01-01 PROCEDURE — 96375 TX/PRO/DX INJ NEW DRUG ADDON: CPT

## 2019-01-01 PROCEDURE — 93005 ELECTROCARDIOGRAM TRACING: CPT | Performed by: HOSPITALIST

## 2019-01-01 PROCEDURE — 7100000001 HC PACU RECOVERY - ADDTL 15 MIN: Performed by: SURGERY

## 2019-01-01 PROCEDURE — 84132 ASSAY OF SERUM POTASSIUM: CPT

## 2019-01-01 PROCEDURE — 87077 CULTURE AEROBIC IDENTIFY: CPT

## 2019-01-01 DEVICE — GRAFT VASC W1XL10CM THK6 PLY SYNTH CROSSLINKED ELAS FOR VASC: Type: IMPLANTABLE DEVICE | Site: GROIN | Status: FUNCTIONAL

## 2019-01-01 RX ORDER — LEVALBUTEROL TARTRATE 45 UG/1
1 AEROSOL, METERED ORAL EVERY 6 HOURS PRN
Status: DISCONTINUED | OUTPATIENT
Start: 2019-01-01 | End: 2019-01-01 | Stop reason: CLARIF

## 2019-01-01 RX ORDER — FUROSEMIDE 40 MG/1
40 TABLET ORAL 2 TIMES DAILY
Status: DISCONTINUED | OUTPATIENT
Start: 2019-01-01 | End: 2019-01-01

## 2019-01-01 RX ORDER — CILOSTAZOL 50 MG/1
50 TABLET ORAL 2 TIMES DAILY
COMMUNITY

## 2019-01-01 RX ORDER — ACETAMINOPHEN 325 MG/1
650 TABLET ORAL EVERY 4 HOURS PRN
Status: DISCONTINUED | OUTPATIENT
Start: 2019-01-01 | End: 2019-01-01

## 2019-01-01 RX ORDER — DIPHENHYDRAMINE HCL 25 MG
25 CAPSULE ORAL EVERY 6 HOURS PRN
Status: DISCONTINUED | OUTPATIENT
Start: 2019-01-01 | End: 2019-01-01 | Stop reason: HOSPADM

## 2019-01-01 RX ORDER — FUROSEMIDE 40 MG/1
40 TABLET ORAL DAILY
Status: ON HOLD | COMMUNITY
End: 2019-01-01 | Stop reason: HOSPADM

## 2019-01-01 RX ORDER — METOPROLOL SUCCINATE 25 MG/1
25 TABLET, EXTENDED RELEASE ORAL 2 TIMES DAILY
Status: DISCONTINUED | OUTPATIENT
Start: 2019-01-01 | End: 2019-01-01 | Stop reason: HOSPADM

## 2019-01-01 RX ORDER — FUROSEMIDE 40 MG/1
40 TABLET ORAL DAILY
Status: DISCONTINUED | OUTPATIENT
Start: 2019-01-01 | End: 2019-01-01

## 2019-01-01 RX ORDER — FUROSEMIDE 10 MG/ML
60 INJECTION INTRAMUSCULAR; INTRAVENOUS 2 TIMES DAILY
Status: DISCONTINUED | OUTPATIENT
Start: 2019-01-01 | End: 2019-01-01

## 2019-01-01 RX ORDER — CILOSTAZOL 100 MG/1
50 TABLET ORAL 2 TIMES DAILY
Status: CANCELLED | OUTPATIENT
Start: 2019-01-01

## 2019-01-01 RX ORDER — SODIUM CHLORIDE 0.9 % (FLUSH) 0.9 %
10 SYRINGE (ML) INJECTION EVERY 12 HOURS SCHEDULED
Status: DISCONTINUED | OUTPATIENT
Start: 2019-01-01 | End: 2019-01-01 | Stop reason: HOSPADM

## 2019-01-01 RX ORDER — GUAIFENESIN/DEXTROMETHORPHAN 100-10MG/5
5 SYRUP ORAL EVERY 4 HOURS PRN
Status: CANCELLED | OUTPATIENT
Start: 2019-01-01

## 2019-01-01 RX ORDER — FUROSEMIDE 10 MG/ML
20 INJECTION INTRAMUSCULAR; INTRAVENOUS DAILY
Status: DISCONTINUED | OUTPATIENT
Start: 2019-01-01 | End: 2019-01-01

## 2019-01-01 RX ORDER — VERAPAMIL HYDROCHLORIDE 240 MG/1
240 TABLET, FILM COATED, EXTENDED RELEASE ORAL NIGHTLY
Status: DISCONTINUED | OUTPATIENT
Start: 2019-01-01 | End: 2019-01-01 | Stop reason: HOSPADM

## 2019-01-01 RX ORDER — DIPHENHYDRAMINE HCL 25 MG
50 TABLET ORAL ONCE
Status: COMPLETED | OUTPATIENT
Start: 2019-01-01 | End: 2019-01-01

## 2019-01-01 RX ORDER — IPRATROPIUM BROMIDE AND ALBUTEROL SULFATE 2.5; .5 MG/3ML; MG/3ML
1 SOLUTION RESPIRATORY (INHALATION) 4 TIMES DAILY
Status: CANCELLED | OUTPATIENT
Start: 2019-01-01

## 2019-01-01 RX ORDER — GUAIFENESIN 600 MG/1
600 TABLET, EXTENDED RELEASE ORAL 2 TIMES DAILY
Status: DISCONTINUED | OUTPATIENT
Start: 2019-01-01 | End: 2019-01-01 | Stop reason: HOSPADM

## 2019-01-01 RX ORDER — FENTANYL CITRATE 50 UG/ML
INJECTION, SOLUTION INTRAMUSCULAR; INTRAVENOUS PRN
Status: DISCONTINUED | OUTPATIENT
Start: 2019-01-01 | End: 2019-01-01 | Stop reason: SDUPTHER

## 2019-01-01 RX ORDER — ACETAMINOPHEN 325 MG/1
650 TABLET ORAL EVERY 4 HOURS PRN
Status: DISCONTINUED | OUTPATIENT
Start: 2019-01-01 | End: 2019-01-01 | Stop reason: HOSPADM

## 2019-01-01 RX ORDER — IPRATROPIUM BROMIDE AND ALBUTEROL SULFATE 2.5; .5 MG/3ML; MG/3ML
1 SOLUTION RESPIRATORY (INHALATION) 4 TIMES DAILY
COMMUNITY

## 2019-01-01 RX ORDER — ALBUTEROL SULFATE 2.5 MG/3ML
1.25 SOLUTION RESPIRATORY (INHALATION) EVERY 6 HOURS PRN
Status: CANCELLED | OUTPATIENT
Start: 2019-01-01

## 2019-01-01 RX ORDER — POTASSIUM CHLORIDE 1.5 G/1.77G
20 POWDER, FOR SOLUTION ORAL DAILY
Status: DISCONTINUED | OUTPATIENT
Start: 2019-01-01 | End: 2019-01-01 | Stop reason: HOSPADM

## 2019-01-01 RX ORDER — LIDOCAINE HYDROCHLORIDE 20 MG/ML
INJECTION, SOLUTION INTRAVENOUS PRN
Status: DISCONTINUED | OUTPATIENT
Start: 2019-01-01 | End: 2019-01-01 | Stop reason: SDUPTHER

## 2019-01-01 RX ORDER — METOLAZONE 2.5 MG/1
5 TABLET ORAL DAILY
Status: DISCONTINUED | OUTPATIENT
Start: 2019-01-01 | End: 2019-01-01

## 2019-01-01 RX ORDER — SODIUM CHLORIDE 0.9 % (FLUSH) 0.9 %
10 SYRINGE (ML) INJECTION PRN
Status: DISCONTINUED | OUTPATIENT
Start: 2019-01-01 | End: 2019-01-01 | Stop reason: HOSPADM

## 2019-01-01 RX ORDER — ASPIRIN 81 MG/1
81 TABLET ORAL DAILY
Status: DISCONTINUED | OUTPATIENT
Start: 2019-01-01 | End: 2019-01-01 | Stop reason: HOSPADM

## 2019-01-01 RX ORDER — POTASSIUM CHLORIDE 20 MEQ/1
40 TABLET, EXTENDED RELEASE ORAL PRN
Status: CANCELLED | OUTPATIENT
Start: 2019-01-01

## 2019-01-01 RX ORDER — SODIUM CHLORIDE 9 MG/ML
INJECTION, SOLUTION INTRAVENOUS CONTINUOUS
Status: DISCONTINUED | OUTPATIENT
Start: 2019-01-01 | End: 2019-01-01 | Stop reason: HOSPADM

## 2019-01-01 RX ORDER — ACETYLCYSTEINE 200 MG/ML
600 SOLUTION ORAL; RESPIRATORY (INHALATION) 2 TIMES DAILY
Status: DISCONTINUED | OUTPATIENT
Start: 2019-01-01 | End: 2019-01-01 | Stop reason: HOSPADM

## 2019-01-01 RX ORDER — GUAIFENESIN/DEXTROMETHORPHAN 100-10MG/5
5 SYRUP ORAL EVERY 4 HOURS PRN
Status: DISCONTINUED | OUTPATIENT
Start: 2019-01-01 | End: 2019-01-01 | Stop reason: HOSPADM

## 2019-01-01 RX ORDER — CILOSTAZOL 100 MG/1
50 TABLET ORAL
Status: DISCONTINUED | OUTPATIENT
Start: 2019-01-01 | End: 2019-01-01 | Stop reason: HOSPADM

## 2019-01-01 RX ORDER — METOLAZONE 2.5 MG/1
2.5 TABLET ORAL DAILY
Status: DISCONTINUED | OUTPATIENT
Start: 2019-01-01 | End: 2019-01-01

## 2019-01-01 RX ORDER — ASPIRIN 81 MG/1
81 TABLET, CHEWABLE ORAL DAILY
Status: CANCELLED | OUTPATIENT
Start: 2019-01-01

## 2019-01-01 RX ORDER — ACETAMINOPHEN 10 MG/ML
INJECTION, SOLUTION INTRAVENOUS PRN
Status: DISCONTINUED | OUTPATIENT
Start: 2019-01-01 | End: 2019-01-01 | Stop reason: SDUPTHER

## 2019-01-01 RX ORDER — ACETYLCYSTEINE 200 MG/ML
600 SOLUTION ORAL; RESPIRATORY (INHALATION) 2 TIMES DAILY
Status: CANCELLED | OUTPATIENT
Start: 2019-01-01

## 2019-01-01 RX ORDER — ACETAMINOPHEN 325 MG/1
650 TABLET ORAL EVERY 8 HOURS PRN
Status: CANCELLED | OUTPATIENT
Start: 2019-01-01

## 2019-01-01 RX ORDER — ONDANSETRON 2 MG/ML
4 INJECTION INTRAMUSCULAR; INTRAVENOUS
Status: DISCONTINUED | OUTPATIENT
Start: 2019-01-01 | End: 2019-01-01 | Stop reason: HOSPADM

## 2019-01-01 RX ORDER — NITROGLYCERIN 0.4 MG/1
0.4 TABLET SUBLINGUAL EVERY 5 MIN PRN
Status: DISCONTINUED | OUTPATIENT
Start: 2019-01-01 | End: 2019-01-01 | Stop reason: HOSPADM

## 2019-01-01 RX ORDER — DIPHENHYDRAMINE HCL 25 MG
25 TABLET ORAL EVERY 6 HOURS PRN
Status: DISCONTINUED | OUTPATIENT
Start: 2019-01-01 | End: 2019-01-01 | Stop reason: HOSPADM

## 2019-01-01 RX ORDER — IPRATROPIUM BROMIDE AND ALBUTEROL SULFATE 2.5; .5 MG/3ML; MG/3ML
1 SOLUTION RESPIRATORY (INHALATION) ONCE
Status: COMPLETED | OUTPATIENT
Start: 2019-01-01 | End: 2019-01-01

## 2019-01-01 RX ORDER — METOPROLOL SUCCINATE 50 MG/1
50 TABLET, EXTENDED RELEASE ORAL 2 TIMES DAILY
Status: DISCONTINUED | OUTPATIENT
Start: 2019-01-01 | End: 2019-01-01 | Stop reason: HOSPADM

## 2019-01-01 RX ORDER — FUROSEMIDE 10 MG/ML
40 INJECTION INTRAMUSCULAR; INTRAVENOUS 2 TIMES DAILY
Status: DISCONTINUED | OUTPATIENT
Start: 2019-01-01 | End: 2019-01-01

## 2019-01-01 RX ORDER — OMEGA-3-ACID ETHYL ESTERS 1 G/1
2 CAPSULE, LIQUID FILLED ORAL 2 TIMES DAILY
Status: DISCONTINUED | OUTPATIENT
Start: 2019-01-01 | End: 2019-01-01 | Stop reason: HOSPADM

## 2019-01-01 RX ORDER — VERAPAMIL HYDROCHLORIDE 240 MG/1
240 CAPSULE, EXTENDED RELEASE ORAL DAILY
Status: DISCONTINUED | OUTPATIENT
Start: 2019-01-01 | End: 2019-01-01 | Stop reason: CLARIF

## 2019-01-01 RX ORDER — ACETAMINOPHEN 325 MG/1
650 TABLET ORAL EVERY 8 HOURS PRN
Status: DISCONTINUED | OUTPATIENT
Start: 2019-01-01 | End: 2019-01-01 | Stop reason: HOSPADM

## 2019-01-01 RX ORDER — HEPARIN SODIUM 1000 [USP'U]/ML
INJECTION, SOLUTION INTRAVENOUS; SUBCUTANEOUS PRN
Status: DISCONTINUED | OUTPATIENT
Start: 2019-01-01 | End: 2019-01-01 | Stop reason: SDUPTHER

## 2019-01-01 RX ORDER — FUROSEMIDE 10 MG/ML
INJECTION INTRAMUSCULAR; INTRAVENOUS
Status: COMPLETED
Start: 2019-01-01 | End: 2019-01-01

## 2019-01-01 RX ORDER — FUROSEMIDE 10 MG/ML
20 INJECTION INTRAMUSCULAR; INTRAVENOUS 2 TIMES DAILY
Status: DISCONTINUED | OUTPATIENT
Start: 2019-01-01 | End: 2019-01-01

## 2019-01-01 RX ORDER — POLYETHYLENE GLYCOL 3350 17 G/17G
17 POWDER, FOR SOLUTION ORAL DAILY PRN
Status: DISCONTINUED | OUTPATIENT
Start: 2019-01-01 | End: 2019-01-01 | Stop reason: HOSPADM

## 2019-01-01 RX ORDER — FUROSEMIDE 10 MG/ML
20 INJECTION INTRAMUSCULAR; INTRAVENOUS ONCE
Status: COMPLETED | OUTPATIENT
Start: 2019-01-01 | End: 2019-01-01

## 2019-01-01 RX ORDER — LOPERAMIDE HYDROCHLORIDE 2 MG/1
2 CAPSULE ORAL 4 TIMES DAILY PRN
Status: DISCONTINUED | OUTPATIENT
Start: 2019-01-01 | End: 2019-01-01 | Stop reason: HOSPADM

## 2019-01-01 RX ORDER — ALBUTEROL SULFATE 90 UG/1
2 AEROSOL, METERED RESPIRATORY (INHALATION) EVERY 6 HOURS PRN
COMMUNITY
End: 2019-01-01

## 2019-01-01 RX ORDER — METHYLPREDNISOLONE SODIUM SUCCINATE 40 MG/ML
40 INJECTION, POWDER, LYOPHILIZED, FOR SOLUTION INTRAMUSCULAR; INTRAVENOUS DAILY
Status: COMPLETED | OUTPATIENT
Start: 2019-01-01 | End: 2019-01-01

## 2019-01-01 RX ORDER — ALBUTEROL SULFATE 90 UG/1
1 AEROSOL, METERED RESPIRATORY (INHALATION) EVERY 6 HOURS PRN
Status: DISCONTINUED | OUTPATIENT
Start: 2019-01-01 | End: 2019-01-01 | Stop reason: HOSPADM

## 2019-01-01 RX ORDER — CEFAZOLIN SODIUM 2 G/50ML
2 SOLUTION INTRAVENOUS EVERY 8 HOURS
Status: DISCONTINUED | OUTPATIENT
Start: 2019-01-01 | End: 2019-01-01 | Stop reason: CLARIF

## 2019-01-01 RX ORDER — CLINDAMYCIN HYDROCHLORIDE 300 MG/1
600 CAPSULE ORAL EVERY 8 HOURS SCHEDULED
Qty: 60 CAPSULE | Refills: 0 | Status: SHIPPED | OUTPATIENT
Start: 2019-01-01 | End: 2019-01-01

## 2019-01-01 RX ORDER — METOLAZONE 2.5 MG/1
2.5 TABLET ORAL
Status: DISCONTINUED | OUTPATIENT
Start: 2019-01-01 | End: 2019-01-01 | Stop reason: HOSPADM

## 2019-01-01 RX ORDER — FUROSEMIDE 40 MG/1
40 TABLET ORAL 2 TIMES DAILY
Status: DISCONTINUED | OUTPATIENT
Start: 2019-01-01 | End: 2019-01-01 | Stop reason: HOSPADM

## 2019-01-01 RX ORDER — DIAZEPAM 5 MG/1
5 TABLET ORAL ONCE
Status: COMPLETED | OUTPATIENT
Start: 2019-01-01 | End: 2019-01-01

## 2019-01-01 RX ORDER — ONDANSETRON 2 MG/ML
4 INJECTION INTRAMUSCULAR; INTRAVENOUS EVERY 6 HOURS PRN
Status: CANCELLED | OUTPATIENT
Start: 2019-01-01

## 2019-01-01 RX ORDER — AMOXICILLIN AND CLAVULANATE POTASSIUM 875; 125 MG/1; MG/1
1 TABLET, FILM COATED ORAL EVERY 12 HOURS SCHEDULED
Status: DISCONTINUED | OUTPATIENT
Start: 2019-01-01 | End: 2019-01-01

## 2019-01-01 RX ORDER — SODIUM CHLORIDE, SODIUM LACTATE, POTASSIUM CHLORIDE, CALCIUM CHLORIDE 600; 310; 30; 20 MG/100ML; MG/100ML; MG/100ML; MG/100ML
INJECTION, SOLUTION INTRAVENOUS CONTINUOUS PRN
Status: DISCONTINUED | OUTPATIENT
Start: 2019-01-01 | End: 2019-01-01 | Stop reason: SDUPTHER

## 2019-01-01 RX ORDER — METOPROLOL SUCCINATE 25 MG/1
25 TABLET, EXTENDED RELEASE ORAL 2 TIMES DAILY
Status: CANCELLED | OUTPATIENT
Start: 2019-01-01

## 2019-01-01 RX ORDER — DOXYCYCLINE HYCLATE 100 MG
100 TABLET ORAL EVERY 12 HOURS SCHEDULED
Status: DISCONTINUED | OUTPATIENT
Start: 2019-01-01 | End: 2019-01-01 | Stop reason: HOSPADM

## 2019-01-01 RX ORDER — FENTANYL CITRATE 50 UG/ML
25 INJECTION, SOLUTION INTRAMUSCULAR; INTRAVENOUS EVERY 5 MIN PRN
Status: DISCONTINUED | OUTPATIENT
Start: 2019-01-01 | End: 2019-01-01 | Stop reason: HOSPADM

## 2019-01-01 RX ORDER — HEPARIN SODIUM 5000 [USP'U]/ML
5000 INJECTION, SOLUTION INTRAVENOUS; SUBCUTANEOUS EVERY 8 HOURS SCHEDULED
Status: DISCONTINUED | OUTPATIENT
Start: 2019-01-01 | End: 2019-01-01 | Stop reason: HOSPADM

## 2019-01-01 RX ORDER — FAMOTIDINE 20 MG/1
20 TABLET, FILM COATED ORAL DAILY
Status: DISCONTINUED | OUTPATIENT
Start: 2019-01-01 | End: 2019-01-01 | Stop reason: HOSPADM

## 2019-01-01 RX ORDER — CHOLECALCIFEROL (VITAMIN D3) 1250 MCG
1 CAPSULE ORAL
COMMUNITY

## 2019-01-01 RX ORDER — IPRATROPIUM BROMIDE AND ALBUTEROL SULFATE 2.5; .5 MG/3ML; MG/3ML
1 SOLUTION RESPIRATORY (INHALATION) 4 TIMES DAILY
Status: DISCONTINUED | OUTPATIENT
Start: 2019-01-01 | End: 2019-01-01 | Stop reason: HOSPADM

## 2019-01-01 RX ORDER — MIDODRINE HYDROCHLORIDE 5 MG/1
5 TABLET ORAL
Status: CANCELLED | OUTPATIENT
Start: 2019-01-01

## 2019-01-01 RX ORDER — CLINDAMYCIN HYDROCHLORIDE 150 MG/1
600 CAPSULE ORAL EVERY 8 HOURS SCHEDULED
Status: DISCONTINUED | OUTPATIENT
Start: 2019-01-01 | End: 2019-01-01 | Stop reason: HOSPADM

## 2019-01-01 RX ORDER — NITROGLYCERIN 0.4 MG/1
0.4 TABLET SUBLINGUAL EVERY 5 MIN PRN
Status: CANCELLED | OUTPATIENT
Start: 2019-01-01

## 2019-01-01 RX ORDER — DEXTROSE MONOHYDRATE 25 G/50ML
12.5 INJECTION, SOLUTION INTRAVENOUS PRN
Status: DISCONTINUED | OUTPATIENT
Start: 2019-01-01 | End: 2019-01-01 | Stop reason: HOSPADM

## 2019-01-01 RX ORDER — OMEGA-3-ACID ETHYL ESTERS 1 G/1
2 CAPSULE, LIQUID FILLED ORAL 2 TIMES DAILY
Status: CANCELLED | OUTPATIENT
Start: 2019-01-01

## 2019-01-01 RX ORDER — DIPHENHYDRAMINE HCL 25 MG
25 TABLET ORAL EVERY 6 HOURS PRN
Status: CANCELLED | OUTPATIENT
Start: 2019-01-01

## 2019-01-01 RX ORDER — LOSARTAN POTASSIUM 100 MG/1
100 TABLET ORAL DAILY
Status: DISCONTINUED | OUTPATIENT
Start: 2019-01-01 | End: 2019-01-01 | Stop reason: HOSPADM

## 2019-01-01 RX ORDER — HYDROCODONE BITARTRATE AND ACETAMINOPHEN 5; 325 MG/1; MG/1
1 TABLET ORAL EVERY 4 HOURS PRN
Status: DISCONTINUED | OUTPATIENT
Start: 2019-01-01 | End: 2019-01-01 | Stop reason: HOSPADM

## 2019-01-01 RX ORDER — POTASSIUM CHLORIDE 1.5 G/1.77G
20 POWDER, FOR SOLUTION ORAL DAILY
Status: CANCELLED | OUTPATIENT
Start: 2019-01-01

## 2019-01-01 RX ORDER — FUROSEMIDE 40 MG/1
40 TABLET ORAL 2 TIMES DAILY
Qty: 60 TABLET | Refills: 3 | DISCHARGE
Start: 2019-01-01

## 2019-01-01 RX ORDER — IPRATROPIUM BROMIDE AND ALBUTEROL SULFATE 2.5; .5 MG/3ML; MG/3ML
SOLUTION RESPIRATORY (INHALATION)
Status: DISPENSED
Start: 2019-01-01 | End: 2019-01-01

## 2019-01-01 RX ORDER — VERAPAMIL HYDROCHLORIDE 240 MG/1
240 TABLET, FILM COATED, EXTENDED RELEASE ORAL DAILY
Status: CANCELLED | OUTPATIENT
Start: 2019-01-01

## 2019-01-01 RX ORDER — ATORVASTATIN CALCIUM 10 MG/1
10 TABLET, FILM COATED ORAL DAILY
Status: CANCELLED | OUTPATIENT
Start: 2019-01-01

## 2019-01-01 RX ORDER — ATORVASTATIN CALCIUM 10 MG/1
10 TABLET, FILM COATED ORAL DAILY
Status: DISCONTINUED | OUTPATIENT
Start: 2019-01-01 | End: 2019-01-01 | Stop reason: HOSPADM

## 2019-01-01 RX ORDER — ALBUTEROL SULFATE 90 UG/1
AEROSOL, METERED RESPIRATORY (INHALATION) PRN
Status: DISCONTINUED | OUTPATIENT
Start: 2019-01-01 | End: 2019-01-01 | Stop reason: SDUPTHER

## 2019-01-01 RX ORDER — DEXAMETHASONE SODIUM PHOSPHATE 4 MG/ML
INJECTION, SOLUTION INTRA-ARTICULAR; INTRALESIONAL; INTRAMUSCULAR; INTRAVENOUS; SOFT TISSUE PRN
Status: DISCONTINUED | OUTPATIENT
Start: 2019-01-01 | End: 2019-01-01 | Stop reason: SDUPTHER

## 2019-01-01 RX ORDER — POTASSIUM CHLORIDE 7.45 MG/ML
10 INJECTION INTRAVENOUS PRN
Status: DISCONTINUED | OUTPATIENT
Start: 2019-01-01 | End: 2019-01-01 | Stop reason: HOSPADM

## 2019-01-01 RX ORDER — METOLAZONE 2.5 MG/1
2.5 TABLET ORAL
DISCHARGE
Start: 2019-01-01

## 2019-01-01 RX ORDER — ATORVASTATIN CALCIUM 10 MG/1
10 TABLET, FILM COATED ORAL NIGHTLY
Status: DISCONTINUED | OUTPATIENT
Start: 2019-01-01 | End: 2019-01-01 | Stop reason: HOSPADM

## 2019-01-01 RX ORDER — METOLAZONE 2.5 MG/1
2.5 TABLET ORAL DAILY
Status: CANCELLED | OUTPATIENT
Start: 2019-01-01

## 2019-01-01 RX ORDER — POTASSIUM CHLORIDE 1.5 G/1.77G
20 POWDER, FOR SOLUTION ORAL DAILY
Status: DISCONTINUED | OUTPATIENT
Start: 2019-01-01 | End: 2019-01-01

## 2019-01-01 RX ORDER — PROPOFOL 10 MG/ML
INJECTION, EMULSION INTRAVENOUS PRN
Status: DISCONTINUED | OUTPATIENT
Start: 2019-01-01 | End: 2019-01-01 | Stop reason: SDUPTHER

## 2019-01-01 RX ORDER — POTASSIUM CHLORIDE 1.5 G/1.77G
20 POWDER, FOR SOLUTION ORAL DAILY
Status: ON HOLD | COMMUNITY
End: 2019-01-01 | Stop reason: HOSPADM

## 2019-01-01 RX ORDER — ROCURONIUM BROMIDE 10 MG/ML
INJECTION, SOLUTION INTRAVENOUS PRN
Status: DISCONTINUED | OUTPATIENT
Start: 2019-01-01 | End: 2019-01-01 | Stop reason: SDUPTHER

## 2019-01-01 RX ORDER — ACETYLCYSTEINE 200 MG/ML
600 SOLUTION ORAL; RESPIRATORY (INHALATION) 2 TIMES DAILY
DISCHARGE
Start: 2019-01-01 | End: 2019-01-01

## 2019-01-01 RX ORDER — HYDROMORPHONE HCL 110MG/55ML
0.5 PATIENT CONTROLLED ANALGESIA SYRINGE INTRAVENOUS EVERY 5 MIN PRN
Status: DISCONTINUED | OUTPATIENT
Start: 2019-01-01 | End: 2019-01-01 | Stop reason: HOSPADM

## 2019-01-01 RX ORDER — METOLAZONE 5 MG/1
2.5 TABLET ORAL DAILY
Status: DISCONTINUED | OUTPATIENT
Start: 2019-01-01 | End: 2019-01-01

## 2019-01-01 RX ORDER — ONDANSETRON 2 MG/ML
4 INJECTION INTRAMUSCULAR; INTRAVENOUS EVERY 6 HOURS PRN
Status: DISCONTINUED | OUTPATIENT
Start: 2019-01-01 | End: 2019-01-01 | Stop reason: HOSPADM

## 2019-01-01 RX ORDER — GUAIFENESIN/DEXTROMETHORPHAN 100-10MG/5
5 SYRUP ORAL EVERY 4 HOURS PRN
Qty: 120 ML | DISCHARGE
Start: 2019-01-01 | End: 2019-01-01

## 2019-01-01 RX ORDER — HYDRALAZINE HYDROCHLORIDE 20 MG/ML
10 INJECTION INTRAMUSCULAR; INTRAVENOUS
Status: DISCONTINUED | OUTPATIENT
Start: 2019-01-01 | End: 2019-01-01 | Stop reason: HOSPADM

## 2019-01-01 RX ORDER — AMOXICILLIN AND CLAVULANATE POTASSIUM 875; 125 MG/1; MG/1
1 TABLET, FILM COATED ORAL EVERY 12 HOURS SCHEDULED
Status: DISCONTINUED | OUTPATIENT
Start: 2019-01-01 | End: 2019-01-01 | Stop reason: HOSPADM

## 2019-01-01 RX ORDER — FUROSEMIDE 40 MG/1
40 TABLET ORAL DAILY
Status: DISCONTINUED | OUTPATIENT
Start: 2019-01-01 | End: 2019-01-01 | Stop reason: HOSPADM

## 2019-01-01 RX ORDER — MIDODRINE HYDROCHLORIDE 5 MG/1
5 TABLET ORAL
Qty: 90 TABLET | Refills: 3 | DISCHARGE
Start: 2019-01-01 | End: 2019-01-01

## 2019-01-01 RX ORDER — DIPHENHYDRAMINE HCL 25 MG
25 TABLET ORAL EVERY 6 HOURS PRN
COMMUNITY
End: 2019-01-01

## 2019-01-01 RX ORDER — POTASSIUM CHLORIDE 29.8 MG/ML
20 INJECTION INTRAVENOUS PRN
Status: DISCONTINUED | OUTPATIENT
Start: 2019-01-01 | End: 2019-01-01 | Stop reason: SDUPTHER

## 2019-01-01 RX ORDER — IPRATROPIUM BROMIDE AND ALBUTEROL SULFATE 2.5; .5 MG/3ML; MG/3ML
1 SOLUTION RESPIRATORY (INHALATION)
Status: DISCONTINUED | OUTPATIENT
Start: 2019-01-01 | End: 2019-01-01

## 2019-01-01 RX ORDER — CILOSTAZOL 100 MG/1
50 TABLET ORAL 2 TIMES DAILY
Status: DISCONTINUED | OUTPATIENT
Start: 2019-01-01 | End: 2019-01-01 | Stop reason: HOSPADM

## 2019-01-01 RX ORDER — SODIUM CHLORIDE 9 MG/ML
INJECTION, SOLUTION INTRAVENOUS CONTINUOUS
Status: DISCONTINUED | OUTPATIENT
Start: 2019-01-01 | End: 2019-01-01

## 2019-01-01 RX ORDER — ALBUTEROL SULFATE 2.5 MG/3ML
2.5 SOLUTION RESPIRATORY (INHALATION)
Status: DISCONTINUED | OUTPATIENT
Start: 2019-01-01 | End: 2019-01-01 | Stop reason: HOSPADM

## 2019-01-01 RX ORDER — METOLAZONE 5 MG/1
2.5 TABLET ORAL
Status: CANCELLED | OUTPATIENT
Start: 2019-01-01

## 2019-01-01 RX ORDER — LEVOFLOXACIN 500 MG/1
250 TABLET, FILM COATED ORAL DAILY
Status: COMPLETED | OUTPATIENT
Start: 2019-01-01 | End: 2019-01-01

## 2019-01-01 RX ORDER — NICOTINE POLACRILEX 4 MG
15 LOZENGE BUCCAL PRN
Status: DISCONTINUED | OUTPATIENT
Start: 2019-01-01 | End: 2019-01-01 | Stop reason: HOSPADM

## 2019-01-01 RX ORDER — SODIUM POLYSTYRENE SULFONATE 4.1 MEQ/G
15 POWDER, FOR SUSPENSION ORAL; RECTAL ONCE
Status: COMPLETED | OUTPATIENT
Start: 2019-01-01 | End: 2019-01-01

## 2019-01-01 RX ORDER — PROTAMINE SULFATE 10 MG/ML
INJECTION, SOLUTION INTRAVENOUS PRN
Status: DISCONTINUED | OUTPATIENT
Start: 2019-01-01 | End: 2019-01-01 | Stop reason: SDUPTHER

## 2019-01-01 RX ORDER — SODIUM CHLORIDE 450 MG/100ML
INJECTION, SOLUTION INTRAVENOUS CONTINUOUS
Status: DISCONTINUED | OUTPATIENT
Start: 2019-01-01 | End: 2019-01-01

## 2019-01-01 RX ORDER — POTASSIUM CHLORIDE 20 MEQ/1
40 TABLET, EXTENDED RELEASE ORAL ONCE
Status: COMPLETED | OUTPATIENT
Start: 2019-01-01 | End: 2019-01-01

## 2019-01-01 RX ORDER — SULFAMETHOXAZOLE AND TRIMETHOPRIM 800; 160 MG/1; MG/1
1 TABLET ORAL EVERY 12 HOURS SCHEDULED
Status: DISCONTINUED | OUTPATIENT
Start: 2019-01-01 | End: 2019-01-01

## 2019-01-01 RX ORDER — LOSARTAN POTASSIUM 100 MG/1
100 TABLET ORAL DAILY
Status: DISCONTINUED | OUTPATIENT
Start: 2019-01-01 | End: 2019-01-01

## 2019-01-01 RX ORDER — LEVOFLOXACIN 500 MG/1
500 TABLET, FILM COATED ORAL DAILY
Status: DISCONTINUED | OUTPATIENT
Start: 2019-01-01 | End: 2019-01-01

## 2019-01-01 RX ORDER — SODIUM POLYSTYRENE SULFONATE 15 G/60ML
15 SUSPENSION ORAL; RECTAL ONCE
Status: DISCONTINUED | OUTPATIENT
Start: 2019-01-01 | End: 2019-01-01

## 2019-01-01 RX ORDER — ASPIRIN 81 MG/1
81 TABLET, CHEWABLE ORAL DAILY
Status: DISCONTINUED | OUTPATIENT
Start: 2019-01-01 | End: 2019-01-01 | Stop reason: HOSPADM

## 2019-01-01 RX ORDER — VERAPAMIL HYDROCHLORIDE 240 MG/1
240 TABLET, FILM COATED, EXTENDED RELEASE ORAL DAILY
Status: DISCONTINUED | OUTPATIENT
Start: 2019-01-01 | End: 2019-01-01 | Stop reason: HOSPADM

## 2019-01-01 RX ORDER — POTASSIUM CHLORIDE 1.5 G/1.77G
40 POWDER, FOR SOLUTION ORAL DAILY
Status: DISCONTINUED | OUTPATIENT
Start: 2019-01-01 | End: 2019-01-01

## 2019-01-01 RX ORDER — HYDROCODONE BITARTRATE AND ACETAMINOPHEN 5; 325 MG/1; MG/1
2 TABLET ORAL EVERY 4 HOURS PRN
Status: DISCONTINUED | OUTPATIENT
Start: 2019-01-01 | End: 2019-01-01

## 2019-01-01 RX ORDER — ACETAMINOPHEN 80 MG
TABLET,CHEWABLE ORAL
Status: COMPLETED
Start: 2019-01-01 | End: 2019-01-01

## 2019-01-01 RX ORDER — SODIUM CHLORIDE 0.9 % (FLUSH) 0.9 %
10 SYRINGE (ML) INJECTION 2 TIMES DAILY
Status: DISCONTINUED | OUTPATIENT
Start: 2019-01-01 | End: 2019-01-01 | Stop reason: HOSPADM

## 2019-01-01 RX ORDER — SUCCINYLCHOLINE CHLORIDE 20 MG/ML
INJECTION INTRAMUSCULAR; INTRAVENOUS PRN
Status: DISCONTINUED | OUTPATIENT
Start: 2019-01-01 | End: 2019-01-01 | Stop reason: SDUPTHER

## 2019-01-01 RX ORDER — SODIUM CHLORIDE 0.9 % (FLUSH) 0.9 %
10 SYRINGE (ML) INJECTION EVERY 12 HOURS SCHEDULED
Status: CANCELLED | OUTPATIENT
Start: 2019-01-01

## 2019-01-01 RX ORDER — METOLAZONE 2.5 MG/1
2.5 TABLET ORAL DAILY
Status: DISCONTINUED | OUTPATIENT
Start: 2019-01-01 | End: 2019-01-01 | Stop reason: HOSPADM

## 2019-01-01 RX ORDER — METOPROLOL SUCCINATE 25 MG/1
25 TABLET, EXTENDED RELEASE ORAL 2 TIMES DAILY
COMMUNITY

## 2019-01-01 RX ORDER — DOXYCYCLINE HYCLATE 100 MG
100 TABLET ORAL EVERY 12 HOURS SCHEDULED
Status: CANCELLED | OUTPATIENT
Start: 2019-01-01

## 2019-01-01 RX ORDER — POTASSIUM CHLORIDE 1.5 G/1.77G
40 POWDER, FOR SOLUTION ORAL PRN
Status: CANCELLED | OUTPATIENT
Start: 2019-01-01

## 2019-01-01 RX ORDER — SODIUM CHLORIDE 0.9 % (FLUSH) 0.9 %
10 SYRINGE (ML) INJECTION PRN
Status: CANCELLED | OUTPATIENT
Start: 2019-01-01

## 2019-01-01 RX ORDER — MIDODRINE HYDROCHLORIDE 5 MG/1
5 TABLET ORAL
Status: DISCONTINUED | OUTPATIENT
Start: 2019-01-01 | End: 2019-01-01 | Stop reason: HOSPADM

## 2019-01-01 RX ORDER — ONDANSETRON 2 MG/ML
INJECTION INTRAMUSCULAR; INTRAVENOUS PRN
Status: DISCONTINUED | OUTPATIENT
Start: 2019-01-01 | End: 2019-01-01 | Stop reason: SDUPTHER

## 2019-01-01 RX ORDER — DOXYCYCLINE HYCLATE 100 MG
100 TABLET ORAL EVERY 12 HOURS SCHEDULED
Status: DISCONTINUED | OUTPATIENT
Start: 2019-01-01 | End: 2019-01-01

## 2019-01-01 RX ORDER — DEXTROSE MONOHYDRATE 50 MG/ML
100 INJECTION, SOLUTION INTRAVENOUS PRN
Status: DISCONTINUED | OUTPATIENT
Start: 2019-01-01 | End: 2019-01-01 | Stop reason: HOSPADM

## 2019-01-01 RX ORDER — FUROSEMIDE 40 MG/1
40 TABLET ORAL 2 TIMES DAILY
Status: CANCELLED | OUTPATIENT
Start: 2019-01-01

## 2019-01-01 RX ORDER — METOLAZONE 5 MG/1
2.5 TABLET ORAL
Status: DISCONTINUED | OUTPATIENT
Start: 2019-01-01 | End: 2019-01-01 | Stop reason: HOSPADM

## 2019-01-01 RX ORDER — AMOXICILLIN AND CLAVULANATE POTASSIUM 875; 125 MG/1; MG/1
1 TABLET, FILM COATED ORAL EVERY 12 HOURS SCHEDULED
Status: CANCELLED | OUTPATIENT
Start: 2019-01-01

## 2019-01-01 RX ORDER — ALBUTEROL SULFATE 2.5 MG/3ML
1.25 SOLUTION RESPIRATORY (INHALATION) EVERY 6 HOURS PRN
Status: DISCONTINUED | OUTPATIENT
Start: 2019-01-01 | End: 2019-01-01 | Stop reason: HOSPADM

## 2019-01-01 RX ORDER — MORPHINE SULFATE 4 MG/ML
2 INJECTION, SOLUTION INTRAMUSCULAR; INTRAVENOUS
Status: DISCONTINUED | OUTPATIENT
Start: 2019-01-01 | End: 2019-01-01

## 2019-01-01 RX ORDER — POTASSIUM CHLORIDE 7.45 MG/ML
10 INJECTION INTRAVENOUS PRN
Status: CANCELLED | OUTPATIENT
Start: 2019-01-01

## 2019-01-01 RX ORDER — CEFAZOLIN SODIUM 2 G/100ML
2 INJECTION, SOLUTION INTRAVENOUS ONCE
Status: COMPLETED | OUTPATIENT
Start: 2019-01-01 | End: 2019-01-01

## 2019-01-01 RX ORDER — LOSARTAN POTASSIUM 25 MG/1
25 TABLET ORAL DAILY
Status: DISCONTINUED | OUTPATIENT
Start: 2019-01-01 | End: 2019-01-01

## 2019-01-01 RX ORDER — POTASSIUM CHLORIDE 20 MEQ/1
40 TABLET, EXTENDED RELEASE ORAL PRN
Status: DISCONTINUED | OUTPATIENT
Start: 2019-01-01 | End: 2019-01-01 | Stop reason: HOSPADM

## 2019-01-01 RX ORDER — POTASSIUM CHLORIDE 1.5 G/1.77G
40 POWDER, FOR SOLUTION ORAL 2 TIMES DAILY
Status: DISCONTINUED | OUTPATIENT
Start: 2019-01-01 | End: 2019-01-01 | Stop reason: HOSPADM

## 2019-01-01 RX ORDER — CEFAZOLIN SODIUM 2 G/100ML
2 INJECTION, SOLUTION INTRAVENOUS ONCE
Status: CANCELLED | OUTPATIENT
Start: 2019-01-01

## 2019-01-01 RX ORDER — POTASSIUM CHLORIDE 1.5 G/1.77G
40 POWDER, FOR SOLUTION ORAL 2 TIMES DAILY
Qty: 30 EACH | Refills: 3 | DISCHARGE
Start: 2019-01-01

## 2019-01-01 RX ORDER — ALBUTEROL SULFATE 90 UG/1
1 AEROSOL, METERED RESPIRATORY (INHALATION) EVERY 6 HOURS PRN
Status: CANCELLED | OUTPATIENT
Start: 2019-01-01

## 2019-01-01 RX ORDER — HYDRALAZINE HYDROCHLORIDE 20 MG/ML
5 INJECTION INTRAMUSCULAR; INTRAVENOUS EVERY 10 MIN PRN
Status: DISCONTINUED | OUTPATIENT
Start: 2019-01-01 | End: 2019-01-01 | Stop reason: HOSPADM

## 2019-01-01 RX ORDER — LEVOFLOXACIN 5 MG/ML
500 INJECTION, SOLUTION INTRAVENOUS EVERY 24 HOURS
Status: DISCONTINUED | OUTPATIENT
Start: 2019-01-01 | End: 2019-01-01

## 2019-01-01 RX ORDER — CEFDINIR 300 MG/1
300 CAPSULE ORAL DAILY
Qty: 5 CAPSULE | Refills: 0
Start: 2019-01-01 | End: 2019-01-01

## 2019-01-01 RX ORDER — ALBUTEROL SULFATE 1.25 MG/3ML
1 SOLUTION RESPIRATORY (INHALATION) EVERY 6 HOURS PRN
COMMUNITY

## 2019-01-01 RX ORDER — CEFAZOLIN SODIUM 2 G/100ML
2 INJECTION, SOLUTION INTRAVENOUS EVERY 8 HOURS
Status: COMPLETED | OUTPATIENT
Start: 2019-01-01 | End: 2019-01-01

## 2019-01-01 RX ORDER — BACLOFEN 10 MG/1
10 TABLET ORAL ONCE
Status: COMPLETED | OUTPATIENT
Start: 2019-01-01 | End: 2019-01-01

## 2019-01-01 RX ORDER — POTASSIUM CHLORIDE 1.5 G/1.77G
40 POWDER, FOR SOLUTION ORAL PRN
Status: DISCONTINUED | OUTPATIENT
Start: 2019-01-01 | End: 2019-01-01 | Stop reason: HOSPADM

## 2019-01-01 RX ORDER — DIPHENHYDRAMINE HCL 25 MG
25 CAPSULE ORAL EVERY 6 HOURS PRN
Status: CANCELLED | OUTPATIENT
Start: 2019-01-01

## 2019-01-01 RX ORDER — LABETALOL HYDROCHLORIDE 5 MG/ML
5 INJECTION, SOLUTION INTRAVENOUS EVERY 10 MIN PRN
Status: DISCONTINUED | OUTPATIENT
Start: 2019-01-01 | End: 2019-01-01 | Stop reason: HOSPADM

## 2019-01-01 RX ORDER — KIT FOR THE PREPARATION OF TECHNETIUM TC 99M PENTETATE 20 MG/1
40 INJECTION, POWDER, LYOPHILIZED, FOR SOLUTION INTRAVENOUS; RESPIRATORY (INHALATION)
Status: COMPLETED | OUTPATIENT
Start: 2019-01-01 | End: 2019-01-01

## 2019-01-01 RX ORDER — ACETAZOLAMIDE 500 MG/5ML
250 INJECTION, POWDER, LYOPHILIZED, FOR SOLUTION INTRAVENOUS EVERY 6 HOURS
Status: COMPLETED | OUTPATIENT
Start: 2019-01-01 | End: 2019-01-01

## 2019-01-01 RX ORDER — METOPROLOL SUCCINATE 50 MG/1
50 TABLET, EXTENDED RELEASE ORAL 2 TIMES DAILY
Status: DISCONTINUED | OUTPATIENT
Start: 2019-01-01 | End: 2019-01-01

## 2019-01-01 RX ORDER — MAGNESIUM SULFATE IN WATER 40 MG/ML
2 INJECTION, SOLUTION INTRAVENOUS ONCE
Status: COMPLETED | OUTPATIENT
Start: 2019-01-01 | End: 2019-01-01

## 2019-01-01 RX ORDER — ALBUTEROL SULFATE 90 UG/1
1 AEROSOL, METERED RESPIRATORY (INHALATION) EVERY 6 HOURS PRN
Status: DISCONTINUED | OUTPATIENT
Start: 2019-01-01 | End: 2019-01-01

## 2019-01-01 RX ADMIN — METOPROLOL SUCCINATE 25 MG: 25 TABLET, EXTENDED RELEASE ORAL at 19:57

## 2019-01-01 RX ADMIN — ACETYLCYSTEINE 600 MG: 200 SOLUTION ORAL; RESPIRATORY (INHALATION) at 08:44

## 2019-01-01 RX ADMIN — SODIUM CHLORIDE, PRESERVATIVE FREE 10 ML: 5 INJECTION INTRAVENOUS at 21:12

## 2019-01-01 RX ADMIN — ENOXAPARIN SODIUM 30 MG: 30 INJECTION SUBCUTANEOUS at 09:27

## 2019-01-01 RX ADMIN — IPRATROPIUM BROMIDE AND ALBUTEROL SULFATE 3 ML: .5; 3 SOLUTION RESPIRATORY (INHALATION) at 10:44

## 2019-01-01 RX ADMIN — VERAPAMIL HYDROCHLORIDE 240 MG: 240 TABLET, FILM COATED, EXTENDED RELEASE ORAL at 09:17

## 2019-01-01 RX ADMIN — CEFEPIME 2 G: 2 INJECTION, POWDER, FOR SOLUTION INTRAVENOUS at 16:34

## 2019-01-01 RX ADMIN — ASPIRIN 81 MG 81 MG: 81 TABLET ORAL at 08:34

## 2019-01-01 RX ADMIN — ALBUTEROL SULFATE 2.5 MG: 2.5 SOLUTION RESPIRATORY (INHALATION) at 15:20

## 2019-01-01 RX ADMIN — OMEGA-3-ACID ETHYL ESTERS 2 G: 1 CAPSULE, LIQUID FILLED ORAL at 20:42

## 2019-01-01 RX ADMIN — CILOSTAZOL 50 MG: 100 TABLET ORAL at 23:47

## 2019-01-01 RX ADMIN — ALBUTEROL SULFATE 2.5 MG: 2.5 SOLUTION RESPIRATORY (INHALATION) at 07:50

## 2019-01-01 RX ADMIN — CILOSTAZOL 50 MG: 100 TABLET ORAL at 17:33

## 2019-01-01 RX ADMIN — CILOSTAZOL 50 MG: 100 TABLET ORAL at 08:06

## 2019-01-01 RX ADMIN — FUROSEMIDE 40 MG: 40 TABLET ORAL at 10:09

## 2019-01-01 RX ADMIN — MICONAZOLE NITRATE: 20 POWDER TOPICAL at 20:59

## 2019-01-01 RX ADMIN — IPRATROPIUM BROMIDE AND ALBUTEROL SULFATE 3 ML: .5; 3 SOLUTION RESPIRATORY (INHALATION) at 15:21

## 2019-01-01 RX ADMIN — ASPIRIN 81 MG 81 MG: 81 TABLET ORAL at 10:02

## 2019-01-01 RX ADMIN — METOPROLOL SUCCINATE 25 MG: 25 TABLET, EXTENDED RELEASE ORAL at 21:24

## 2019-01-01 RX ADMIN — LOSARTAN POTASSIUM 100 MG: 100 TABLET, FILM COATED ORAL at 08:37

## 2019-01-01 RX ADMIN — METOLAZONE 2.5 MG: 2.5 TABLET ORAL at 09:45

## 2019-01-01 RX ADMIN — ALBUTEROL SULFATE 2.5 MG: 2.5 SOLUTION RESPIRATORY (INHALATION) at 07:40

## 2019-01-01 RX ADMIN — METOPROLOL SUCCINATE 25 MG: 25 TABLET, EXTENDED RELEASE ORAL at 19:47

## 2019-01-01 RX ADMIN — IPRATROPIUM BROMIDE AND ALBUTEROL SULFATE 1 AMPULE: .5; 3 SOLUTION RESPIRATORY (INHALATION) at 11:15

## 2019-01-01 RX ADMIN — CILOSTAZOL 50 MG: 100 TABLET ORAL at 05:21

## 2019-01-01 RX ADMIN — SODIUM CHLORIDE, PRESERVATIVE FREE 10 ML: 5 INJECTION INTRAVENOUS at 20:57

## 2019-01-01 RX ADMIN — ATORVASTATIN CALCIUM 10 MG: 10 TABLET, FILM COATED ORAL at 21:24

## 2019-01-01 RX ADMIN — SODIUM CHLORIDE, PRESERVATIVE FREE 10 ML: 5 INJECTION INTRAVENOUS at 20:51

## 2019-01-01 RX ADMIN — IPRATROPIUM BROMIDE AND ALBUTEROL SULFATE 3 ML: .5; 3 SOLUTION RESPIRATORY (INHALATION) at 10:26

## 2019-01-01 RX ADMIN — ACETAZOLAMIDE 250 MG: 500 INJECTION, POWDER, LYOPHILIZED, FOR SOLUTION INTRAVENOUS at 17:48

## 2019-01-01 RX ADMIN — FUROSEMIDE 40 MG: 10 INJECTION, SOLUTION INTRAVENOUS at 08:25

## 2019-01-01 RX ADMIN — CILOSTAZOL 50 MG: 100 TABLET ORAL at 21:54

## 2019-01-01 RX ADMIN — OMEGA-3-ACID ETHYL ESTERS 2 G: 1 CAPSULE, LIQUID FILLED ORAL at 09:33

## 2019-01-01 RX ADMIN — IPRATROPIUM BROMIDE AND ALBUTEROL SULFATE 3 ML: .5; 3 SOLUTION RESPIRATORY (INHALATION) at 16:07

## 2019-01-01 RX ADMIN — METOPROLOL SUCCINATE 50 MG: 50 TABLET, EXTENDED RELEASE ORAL at 22:31

## 2019-01-01 RX ADMIN — ENOXAPARIN SODIUM 30 MG: 30 INJECTION SUBCUTANEOUS at 08:33

## 2019-01-01 RX ADMIN — ACETAMINOPHEN 650 MG: 325 TABLET ORAL at 19:30

## 2019-01-01 RX ADMIN — FUROSEMIDE 40 MG: 40 TABLET ORAL at 09:55

## 2019-01-01 RX ADMIN — DIAZEPAM 5 MG: 5 TABLET ORAL at 07:29

## 2019-01-01 RX ADMIN — IPRATROPIUM BROMIDE AND ALBUTEROL SULFATE 3 ML: .5; 3 SOLUTION RESPIRATORY (INHALATION) at 11:54

## 2019-01-01 RX ADMIN — CILOSTAZOL 50 MG: 100 TABLET ORAL at 20:54

## 2019-01-01 RX ADMIN — METOPROLOL SUCCINATE 25 MG: 25 TABLET, EXTENDED RELEASE ORAL at 20:20

## 2019-01-01 RX ADMIN — MICONAZOLE NITRATE: 20 POWDER TOPICAL at 08:37

## 2019-01-01 RX ADMIN — METOPROLOL SUCCINATE 25 MG: 25 TABLET, EXTENDED RELEASE ORAL at 20:41

## 2019-01-01 RX ADMIN — ACETAMINOPHEN 650 MG: 325 TABLET ORAL at 00:26

## 2019-01-01 RX ADMIN — CEFEPIME HYDROCHLORIDE 2 G: 2 INJECTION, POWDER, FOR SOLUTION INTRAVENOUS at 20:00

## 2019-01-01 RX ADMIN — OMEGA-3-ACID ETHYL ESTERS 2 G: 1 CAPSULE, LIQUID FILLED ORAL at 20:55

## 2019-01-01 RX ADMIN — Medication: at 11:45

## 2019-01-01 RX ADMIN — CEFTRIAXONE SODIUM 1 G: 1 INJECTION, POWDER, FOR SOLUTION INTRAMUSCULAR; INTRAVENOUS at 18:39

## 2019-01-01 RX ADMIN — POTASSIUM CHLORIDE 20 MEQ: 1.5 POWDER, FOR SOLUTION ORAL at 09:33

## 2019-01-01 RX ADMIN — LOSARTAN POTASSIUM 100 MG: 100 TABLET ORAL at 09:01

## 2019-01-01 RX ADMIN — POTASSIUM CHLORIDE 20 MEQ: 1.5 POWDER, FOR SOLUTION ORAL at 08:33

## 2019-01-01 RX ADMIN — SODIUM CHLORIDE, PRESERVATIVE FREE 10 ML: 5 INJECTION INTRAVENOUS at 20:52

## 2019-01-01 RX ADMIN — ASPIRIN 81 MG 81 MG: 81 TABLET ORAL at 09:41

## 2019-01-01 RX ADMIN — LEVOFLOXACIN 500 MG: 500 TABLET, FILM COATED ORAL at 08:31

## 2019-01-01 RX ADMIN — ALBUTEROL SULFATE 2.5 MG: 2.5 SOLUTION RESPIRATORY (INHALATION) at 02:48

## 2019-01-01 RX ADMIN — ALBUTEROL SULFATE 2.5 MG: 2.5 SOLUTION RESPIRATORY (INHALATION) at 07:18

## 2019-01-01 RX ADMIN — METOLAZONE 2.5 MG: 2.5 TABLET ORAL at 09:41

## 2019-01-01 RX ADMIN — SODIUM POLYSTYRENE SULFONATE 15 G: 1 POWDER ORAL; RECTAL at 16:38

## 2019-01-01 RX ADMIN — LIDOCAINE HYDROCHLORIDE 100 MG: 20 INJECTION, SOLUTION INTRAVENOUS at 11:32

## 2019-01-01 RX ADMIN — POTASSIUM CHLORIDE 20 MEQ: 1.5 POWDER, FOR SOLUTION ORAL at 12:23

## 2019-01-01 RX ADMIN — VERAPAMIL HYDROCHLORIDE 240 MG: 240 TABLET, FILM COATED, EXTENDED RELEASE ORAL at 08:40

## 2019-01-01 RX ADMIN — IPRATROPIUM BROMIDE AND ALBUTEROL SULFATE 3 ML: .5; 3 SOLUTION RESPIRATORY (INHALATION) at 11:10

## 2019-01-01 RX ADMIN — IPRATROPIUM BROMIDE AND ALBUTEROL SULFATE 3 ML: .5; 3 SOLUTION RESPIRATORY (INHALATION) at 19:39

## 2019-01-01 RX ADMIN — CILOSTAZOL 50 MG: 100 TABLET ORAL at 05:34

## 2019-01-01 RX ADMIN — SODIUM CHLORIDE, PRESERVATIVE FREE 10 ML: 5 INJECTION INTRAVENOUS at 03:10

## 2019-01-01 RX ADMIN — ACETYLCYSTEINE 600 MG: 200 SOLUTION ORAL; RESPIRATORY (INHALATION) at 20:29

## 2019-01-01 RX ADMIN — LOSARTAN POTASSIUM 100 MG: 100 TABLET ORAL at 08:32

## 2019-01-01 RX ADMIN — METOPROLOL SUCCINATE 50 MG: 50 TABLET, EXTENDED RELEASE ORAL at 09:12

## 2019-01-01 RX ADMIN — HEPARIN SODIUM 5000 UNITS: 5000 INJECTION INTRAVENOUS; SUBCUTANEOUS at 05:21

## 2019-01-01 RX ADMIN — SODIUM CHLORIDE, PRESERVATIVE FREE 10 ML: 5 INJECTION INTRAVENOUS at 22:38

## 2019-01-01 RX ADMIN — ALBUTEROL SULFATE 2.5 MG: 2.5 SOLUTION RESPIRATORY (INHALATION) at 11:22

## 2019-01-01 RX ADMIN — HEPARIN SODIUM 5000 UNITS: 5000 INJECTION INTRAVENOUS; SUBCUTANEOUS at 21:25

## 2019-01-01 RX ADMIN — FUROSEMIDE 20 MG: 10 INJECTION, SOLUTION INTRAVENOUS at 09:30

## 2019-01-01 RX ADMIN — MICONAZOLE NITRATE: 20 POWDER TOPICAL at 23:55

## 2019-01-01 RX ADMIN — ATORVASTATIN CALCIUM 10 MG: 10 TABLET, FILM COATED ORAL at 09:28

## 2019-01-01 RX ADMIN — METOPROLOL SUCCINATE 25 MG: 25 TABLET, EXTENDED RELEASE ORAL at 21:15

## 2019-01-01 RX ADMIN — INSULIN LISPRO 2 UNITS: 100 INJECTION, SOLUTION INTRAVENOUS; SUBCUTANEOUS at 19:58

## 2019-01-01 RX ADMIN — LOSARTAN POTASSIUM 100 MG: 100 TABLET, FILM COATED ORAL at 09:03

## 2019-01-01 RX ADMIN — CILOSTAZOL 50 MG: 100 TABLET ORAL at 08:40

## 2019-01-01 RX ADMIN — ACETAZOLAMIDE 250 MG: 500 INJECTION, POWDER, LYOPHILIZED, FOR SOLUTION INTRAVENOUS at 00:39

## 2019-01-01 RX ADMIN — IPRATROPIUM BROMIDE AND ALBUTEROL SULFATE 3 ML: .5; 3 SOLUTION RESPIRATORY (INHALATION) at 19:24

## 2019-01-01 RX ADMIN — OMEGA-3-ACID ETHYL ESTERS 2 G: 1 CAPSULE, LIQUID FILLED ORAL at 21:14

## 2019-01-01 RX ADMIN — CLINDAMYCIN HYDROCHLORIDE 600 MG: 150 CAPSULE ORAL at 06:46

## 2019-01-01 RX ADMIN — HEPARIN SODIUM 5000 UNITS: 1000 INJECTION, SOLUTION INTRAVENOUS; SUBCUTANEOUS at 13:12

## 2019-01-01 RX ADMIN — FUROSEMIDE 60 MG: 10 INJECTION, SOLUTION INTRAMUSCULAR; INTRAVENOUS at 10:02

## 2019-01-01 RX ADMIN — OMEGA-3-ACID ETHYL ESTERS 2 G: 1 CAPSULE, LIQUID FILLED ORAL at 20:54

## 2019-01-01 RX ADMIN — MICONAZOLE NITRATE: 20 POWDER TOPICAL at 20:55

## 2019-01-01 RX ADMIN — VERAPAMIL HYDROCHLORIDE 240 MG: 240 TABLET, FILM COATED, EXTENDED RELEASE ORAL at 09:28

## 2019-01-01 RX ADMIN — GUAIFENESIN 600 MG: 600 TABLET, EXTENDED RELEASE ORAL at 08:37

## 2019-01-01 RX ADMIN — METOPROLOL SUCCINATE 25 MG: 25 TABLET, EXTENDED RELEASE ORAL at 20:42

## 2019-01-01 RX ADMIN — GUAIFENESIN 600 MG: 600 TABLET, EXTENDED RELEASE ORAL at 21:14

## 2019-01-01 RX ADMIN — METOLAZONE 2.5 MG: 5 TABLET ORAL at 08:05

## 2019-01-01 RX ADMIN — DIPHENHYDRAMINE HYDROCHLORIDE 25 MG: 25 TABLET ORAL at 00:26

## 2019-01-01 RX ADMIN — IPRATROPIUM BROMIDE AND ALBUTEROL SULFATE 3 ML: .5; 3 SOLUTION RESPIRATORY (INHALATION) at 20:08

## 2019-01-01 RX ADMIN — AZITHROMYCIN DIHYDRATE 500 MG: 500 INJECTION, POWDER, LYOPHILIZED, FOR SOLUTION INTRAVENOUS at 19:47

## 2019-01-01 RX ADMIN — ACETYLCYSTEINE 600 MG: 200 SOLUTION ORAL; RESPIRATORY (INHALATION) at 21:09

## 2019-01-01 RX ADMIN — ALBUTEROL SULFATE 2.5 MG: 2.5 SOLUTION RESPIRATORY (INHALATION) at 07:45

## 2019-01-01 RX ADMIN — ENOXAPARIN SODIUM 30 MG: 30 INJECTION SUBCUTANEOUS at 08:26

## 2019-01-01 RX ADMIN — MICONAZOLE NITRATE: 20 POWDER TOPICAL at 08:12

## 2019-01-01 RX ADMIN — ACETAMINOPHEN 650 MG: 325 TABLET ORAL at 19:57

## 2019-01-01 RX ADMIN — SODIUM CHLORIDE, PRESERVATIVE FREE 10 ML: 5 INJECTION INTRAVENOUS at 19:37

## 2019-01-01 RX ADMIN — VERAPAMIL HYDROCHLORIDE 240 MG: 240 TABLET, FILM COATED, EXTENDED RELEASE ORAL at 10:02

## 2019-01-01 RX ADMIN — IPRATROPIUM BROMIDE AND ALBUTEROL SULFATE 3 ML: .5; 3 SOLUTION RESPIRATORY (INHALATION) at 22:37

## 2019-01-01 RX ADMIN — ACETAMINOPHEN 650 MG: 325 TABLET ORAL at 18:14

## 2019-01-01 RX ADMIN — CILOSTAZOL 50 MG: 100 TABLET ORAL at 09:34

## 2019-01-01 RX ADMIN — OMEGA-3-ACID ETHYL ESTERS 2 G: 1 CAPSULE, LIQUID FILLED ORAL at 20:41

## 2019-01-01 RX ADMIN — SODIUM CHLORIDE, PRESERVATIVE FREE 10 ML: 5 INJECTION INTRAVENOUS at 23:56

## 2019-01-01 RX ADMIN — ACETYLCYSTEINE 600 MG: 200 SOLUTION ORAL; RESPIRATORY (INHALATION) at 07:46

## 2019-01-01 RX ADMIN — DOXYCYCLINE HYCLATE 100 MG: 100 TABLET, COATED ORAL at 21:01

## 2019-01-01 RX ADMIN — ALBUTEROL SULFATE 2.5 MG: 2.5 SOLUTION RESPIRATORY (INHALATION) at 18:30

## 2019-01-01 RX ADMIN — HYDROCODONE BITARTRATE AND ACETAMINOPHEN 2 TABLET: 5; 325 TABLET ORAL at 20:18

## 2019-01-01 RX ADMIN — IPRATROPIUM BROMIDE AND ALBUTEROL SULFATE 3 ML: .5; 3 SOLUTION RESPIRATORY (INHALATION) at 14:55

## 2019-01-01 RX ADMIN — INSULIN LISPRO 1 UNITS: 100 INJECTION, SOLUTION INTRAVENOUS; SUBCUTANEOUS at 20:00

## 2019-01-01 RX ADMIN — METHYLPREDNISOLONE SODIUM SUCCINATE 40 MG: 40 INJECTION, POWDER, FOR SOLUTION INTRAMUSCULAR; INTRAVENOUS at 10:02

## 2019-01-01 RX ADMIN — ACETAMINOPHEN 650 MG: 325 TABLET ORAL at 21:01

## 2019-01-01 RX ADMIN — IPRATROPIUM BROMIDE AND ALBUTEROL SULFATE 3 ML: .5; 3 SOLUTION RESPIRATORY (INHALATION) at 15:50

## 2019-01-01 RX ADMIN — GUAIFENESIN 600 MG: 600 TABLET, EXTENDED RELEASE ORAL at 09:12

## 2019-01-01 RX ADMIN — ATORVASTATIN CALCIUM 10 MG: 10 TABLET, FILM COATED ORAL at 08:30

## 2019-01-01 RX ADMIN — IPRATROPIUM BROMIDE AND ALBUTEROL SULFATE 3 ML: .5; 3 SOLUTION RESPIRATORY (INHALATION) at 20:21

## 2019-01-01 RX ADMIN — OMEGA-3-ACID ETHYL ESTERS 2 G: 1 CAPSULE, LIQUID FILLED ORAL at 21:47

## 2019-01-01 RX ADMIN — POTASSIUM CHLORIDE 40 MEQ: 1.5 POWDER, FOR SOLUTION ORAL at 08:40

## 2019-01-01 RX ADMIN — CEFEPIME 2 G: 2 INJECTION, POWDER, FOR SOLUTION INTRAVENOUS at 03:11

## 2019-01-01 RX ADMIN — ATORVASTATIN CALCIUM 10 MG: 10 TABLET, FILM COATED ORAL at 09:41

## 2019-01-01 RX ADMIN — ATORVASTATIN CALCIUM 10 MG: 10 TABLET, FILM COATED ORAL at 10:01

## 2019-01-01 RX ADMIN — METOLAZONE 2.5 MG: 5 TABLET ORAL at 08:27

## 2019-01-01 RX ADMIN — IPRATROPIUM BROMIDE AND ALBUTEROL SULFATE 3 ML: .5; 3 SOLUTION RESPIRATORY (INHALATION) at 14:32

## 2019-01-01 RX ADMIN — OMEGA-3-ACID ETHYL ESTERS 2 G: 1 CAPSULE, LIQUID FILLED ORAL at 09:30

## 2019-01-01 RX ADMIN — METOPROLOL SUCCINATE 25 MG: 25 TABLET, EXTENDED RELEASE ORAL at 09:01

## 2019-01-01 RX ADMIN — FENTANYL CITRATE 100 MCG: 50 INJECTION INTRAMUSCULAR; INTRAVENOUS at 11:32

## 2019-01-01 RX ADMIN — METHYLPREDNISOLONE SODIUM SUCCINATE 40 MG: 40 INJECTION, POWDER, FOR SOLUTION INTRAMUSCULAR; INTRAVENOUS at 12:01

## 2019-01-01 RX ADMIN — METOPROLOL SUCCINATE 50 MG: 50 TABLET, EXTENDED RELEASE ORAL at 08:38

## 2019-01-01 RX ADMIN — GUAIFENESIN AND DEXTROMETHORPHAN 5 ML: 100; 10 SYRUP ORAL at 13:30

## 2019-01-01 RX ADMIN — ACETAZOLAMIDE 250 MG: 500 INJECTION, POWDER, LYOPHILIZED, FOR SOLUTION INTRAVENOUS at 06:17

## 2019-01-01 RX ADMIN — PHENYLEPHRINE HYDROCHLORIDE 50 MCG: 10 INJECTION INTRAVENOUS at 13:32

## 2019-01-01 RX ADMIN — ACETYLCYSTEINE 600 MG: 200 SOLUTION ORAL; RESPIRATORY (INHALATION) at 07:30

## 2019-01-01 RX ADMIN — ACETYLCYSTEINE 600 MG: 200 SOLUTION ORAL; RESPIRATORY (INHALATION) at 20:41

## 2019-01-01 RX ADMIN — ACETYLCYSTEINE 600 MG: 200 SOLUTION ORAL; RESPIRATORY (INHALATION) at 08:56

## 2019-01-01 RX ADMIN — ASPIRIN 81 MG 81 MG: 81 TABLET ORAL at 09:55

## 2019-01-01 RX ADMIN — VANCOMYCIN HYDROCHLORIDE 750 MG: 750 INJECTION, SOLUTION INTRAVENOUS at 18:32

## 2019-01-01 RX ADMIN — ACETAMINOPHEN 1000 MG: 10 INJECTION, SOLUTION INTRAVENOUS at 12:02

## 2019-01-01 RX ADMIN — LEVOFLOXACIN 500 MG: 500 TABLET, FILM COATED ORAL at 08:33

## 2019-01-01 RX ADMIN — METOPROLOL SUCCINATE 25 MG: 25 TABLET, EXTENDED RELEASE ORAL at 21:18

## 2019-01-01 RX ADMIN — IPRATROPIUM BROMIDE AND ALBUTEROL SULFATE 3 ML: .5; 3 SOLUTION RESPIRATORY (INHALATION) at 11:28

## 2019-01-01 RX ADMIN — IPRATROPIUM BROMIDE AND ALBUTEROL SULFATE 3 ML: .5; 3 SOLUTION RESPIRATORY (INHALATION) at 12:15

## 2019-01-01 RX ADMIN — VERAPAMIL HYDROCHLORIDE 240 MG: 240 TABLET, FILM COATED, EXTENDED RELEASE ORAL at 09:55

## 2019-01-01 RX ADMIN — ENOXAPARIN SODIUM 30 MG: 30 INJECTION SUBCUTANEOUS at 09:13

## 2019-01-01 RX ADMIN — HEPARIN SODIUM 5000 UNITS: 5000 INJECTION INTRAVENOUS; SUBCUTANEOUS at 18:49

## 2019-01-01 RX ADMIN — SODIUM CHLORIDE, PRESERVATIVE FREE 10 ML: 5 INJECTION INTRAVENOUS at 08:35

## 2019-01-01 RX ADMIN — CILOSTAZOL 50 MG: 100 TABLET ORAL at 09:13

## 2019-01-01 RX ADMIN — HEPARIN SODIUM 5000 UNITS: 5000 INJECTION INTRAVENOUS; SUBCUTANEOUS at 22:00

## 2019-01-01 RX ADMIN — METOPROLOL SUCCINATE 25 MG: 25 TABLET, EXTENDED RELEASE ORAL at 09:42

## 2019-01-01 RX ADMIN — ASPIRIN 81 MG: 81 TABLET, COATED ORAL at 09:03

## 2019-01-01 RX ADMIN — IPRATROPIUM BROMIDE AND ALBUTEROL SULFATE 3 ML: .5; 3 SOLUTION RESPIRATORY (INHALATION) at 08:00

## 2019-01-01 RX ADMIN — ALBUTEROL SULFATE 2.5 MG: 2.5 SOLUTION RESPIRATORY (INHALATION) at 20:15

## 2019-01-01 RX ADMIN — ALBUTEROL SULFATE 1.25 MG: 2.5 SOLUTION RESPIRATORY (INHALATION) at 18:14

## 2019-01-01 RX ADMIN — ASPIRIN 81 MG 81 MG: 81 TABLET ORAL at 09:00

## 2019-01-01 RX ADMIN — DOXYCYCLINE HYCLATE 100 MG: 100 TABLET, COATED ORAL at 09:28

## 2019-01-01 RX ADMIN — METOPROLOL SUCCINATE 25 MG: 25 TABLET, EXTENDED RELEASE ORAL at 08:06

## 2019-01-01 RX ADMIN — ALBUTEROL SULFATE 2.5 MG: 2.5 SOLUTION RESPIRATORY (INHALATION) at 11:00

## 2019-01-01 RX ADMIN — ATORVASTATIN CALCIUM 10 MG: 10 TABLET, FILM COATED ORAL at 09:55

## 2019-01-01 RX ADMIN — ENOXAPARIN SODIUM 30 MG: 30 INJECTION SUBCUTANEOUS at 09:55

## 2019-01-01 RX ADMIN — HEPARIN SODIUM 5000 UNITS: 5000 INJECTION INTRAVENOUS; SUBCUTANEOUS at 14:48

## 2019-01-01 RX ADMIN — METHYLPREDNISOLONE SODIUM SUCCINATE 40 MG: 40 INJECTION, POWDER, FOR SOLUTION INTRAMUSCULAR; INTRAVENOUS at 08:40

## 2019-01-01 RX ADMIN — IPRATROPIUM BROMIDE AND ALBUTEROL SULFATE 3 ML: .5; 3 SOLUTION RESPIRATORY (INHALATION) at 08:56

## 2019-01-01 RX ADMIN — CILOSTAZOL 50 MG: 100 TABLET ORAL at 09:30

## 2019-01-01 RX ADMIN — IPRATROPIUM BROMIDE AND ALBUTEROL SULFATE 3 ML: .5; 3 SOLUTION RESPIRATORY (INHALATION) at 19:18

## 2019-01-01 RX ADMIN — HEPARIN SODIUM 5000 UNITS: 5000 INJECTION INTRAVENOUS; SUBCUTANEOUS at 15:52

## 2019-01-01 RX ADMIN — VERAPAMIL HYDROCHLORIDE 240 MG: 240 TABLET, FILM COATED, EXTENDED RELEASE ORAL at 09:13

## 2019-01-01 RX ADMIN — OMEGA-3-ACID ETHYL ESTERS 2 G: 1 CAPSULE, LIQUID FILLED ORAL at 08:33

## 2019-01-01 RX ADMIN — ATORVASTATIN CALCIUM 10 MG: 10 TABLET, FILM COATED ORAL at 10:02

## 2019-01-01 RX ADMIN — IPRATROPIUM BROMIDE AND ALBUTEROL SULFATE 3 ML: .5; 3 SOLUTION RESPIRATORY (INHALATION) at 20:20

## 2019-01-01 RX ADMIN — FUROSEMIDE 60 MG: 10 INJECTION, SOLUTION INTRAMUSCULAR; INTRAVENOUS at 17:25

## 2019-01-01 RX ADMIN — ACETYLCYSTEINE 600 MG: 200 SOLUTION ORAL; RESPIRATORY (INHALATION) at 20:21

## 2019-01-01 RX ADMIN — MICONAZOLE NITRATE: 20 CREAM TOPICAL at 02:00

## 2019-01-01 RX ADMIN — METOPROLOL SUCCINATE 25 MG: 25 TABLET, EXTENDED RELEASE ORAL at 20:55

## 2019-01-01 RX ADMIN — IPRATROPIUM BROMIDE AND ALBUTEROL SULFATE 3 ML: .5; 3 SOLUTION RESPIRATORY (INHALATION) at 11:26

## 2019-01-01 RX ADMIN — FUROSEMIDE 20 MG: 10 INJECTION INTRAMUSCULAR; INTRAVENOUS at 18:00

## 2019-01-01 RX ADMIN — ENOXAPARIN SODIUM 30 MG: 30 INJECTION SUBCUTANEOUS at 08:31

## 2019-01-01 RX ADMIN — CILOSTAZOL 50 MG: 100 TABLET ORAL at 22:14

## 2019-01-01 RX ADMIN — OMEGA-3-ACID ETHYL ESTERS 2 G: 1 CAPSULE, LIQUID FILLED ORAL at 20:52

## 2019-01-01 RX ADMIN — SODIUM CHLORIDE, PRESERVATIVE FREE 10 ML: 5 INJECTION INTRAVENOUS at 09:33

## 2019-01-01 RX ADMIN — LEVOFLOXACIN 500 MG: 5 INJECTION, SOLUTION INTRAVENOUS at 12:41

## 2019-01-01 RX ADMIN — CEFEPIME 2 G: 2 INJECTION, POWDER, FOR SOLUTION INTRAVENOUS at 15:59

## 2019-01-01 RX ADMIN — POTASSIUM CHLORIDE 40 MEQ: 1500 TABLET, EXTENDED RELEASE ORAL at 08:30

## 2019-01-01 RX ADMIN — POTASSIUM CHLORIDE 20 MEQ: 1.5 POWDER, FOR SOLUTION ORAL at 10:08

## 2019-01-01 RX ADMIN — PHENYLEPHRINE HYDROCHLORIDE 50 MCG: 10 INJECTION INTRAVENOUS at 13:07

## 2019-01-01 RX ADMIN — IPRATROPIUM BROMIDE AND ALBUTEROL SULFATE 3 ML: .5; 3 SOLUTION RESPIRATORY (INHALATION) at 16:11

## 2019-01-01 RX ADMIN — ASPIRIN 81 MG 81 MG: 81 TABLET ORAL at 08:06

## 2019-01-01 RX ADMIN — CEFTRIAXONE SODIUM 1 G: 1 INJECTION, POWDER, FOR SOLUTION INTRAMUSCULAR; INTRAVENOUS at 19:01

## 2019-01-01 RX ADMIN — OMEGA-3-ACID ETHYL ESTERS 2 G: 1 CAPSULE, LIQUID FILLED ORAL at 08:34

## 2019-01-01 RX ADMIN — ASPIRIN 81 MG: 81 TABLET, COATED ORAL at 09:12

## 2019-01-01 RX ADMIN — IPRATROPIUM BROMIDE AND ALBUTEROL SULFATE 3 ML: .5; 3 SOLUTION RESPIRATORY (INHALATION) at 20:01

## 2019-01-01 RX ADMIN — METOPROLOL SUCCINATE 25 MG: 25 TABLET, EXTENDED RELEASE ORAL at 09:55

## 2019-01-01 RX ADMIN — IPRATROPIUM BROMIDE AND ALBUTEROL SULFATE 3 ML: .5; 3 SOLUTION RESPIRATORY (INHALATION) at 15:37

## 2019-01-01 RX ADMIN — ATORVASTATIN CALCIUM 10 MG: 10 TABLET, FILM COATED ORAL at 19:37

## 2019-01-01 RX ADMIN — FUROSEMIDE 40 MG: 10 INJECTION, SOLUTION INTRAVENOUS at 09:32

## 2019-01-01 RX ADMIN — ENOXAPARIN SODIUM 30 MG: 30 INJECTION SUBCUTANEOUS at 10:01

## 2019-01-01 RX ADMIN — HEPARIN SODIUM 5000 UNITS: 5000 INJECTION INTRAVENOUS; SUBCUTANEOUS at 06:30

## 2019-01-01 RX ADMIN — FUROSEMIDE 20 MG: 10 INJECTION, SOLUTION INTRAMUSCULAR; INTRAVENOUS at 11:38

## 2019-01-01 RX ADMIN — SODIUM CHLORIDE, PRESERVATIVE FREE 10 ML: 5 INJECTION INTRAVENOUS at 03:30

## 2019-01-01 RX ADMIN — ENOXAPARIN SODIUM 30 MG: 30 INJECTION SUBCUTANEOUS at 08:36

## 2019-01-01 RX ADMIN — MICONAZOLE NITRATE: 20 POWDER TOPICAL at 08:42

## 2019-01-01 RX ADMIN — METOPROLOL SUCCINATE 25 MG: 25 TABLET, EXTENDED RELEASE ORAL at 08:38

## 2019-01-01 RX ADMIN — GUAIFENESIN 600 MG: 600 TABLET, EXTENDED RELEASE ORAL at 20:51

## 2019-01-01 RX ADMIN — POTASSIUM CHLORIDE 20 MEQ: 1.5 POWDER, FOR SOLUTION ORAL at 08:38

## 2019-01-01 RX ADMIN — HEPARIN SODIUM 5000 UNITS: 5000 INJECTION INTRAVENOUS; SUBCUTANEOUS at 05:34

## 2019-01-01 RX ADMIN — IPRATROPIUM BROMIDE AND ALBUTEROL SULFATE 1 AMPULE: .5; 3 SOLUTION RESPIRATORY (INHALATION) at 12:13

## 2019-01-01 RX ADMIN — HEPARIN SODIUM 5000 UNITS: 5000 INJECTION INTRAVENOUS; SUBCUTANEOUS at 15:55

## 2019-01-01 RX ADMIN — SODIUM CHLORIDE, PRESERVATIVE FREE 10 ML: 5 INJECTION INTRAVENOUS at 17:15

## 2019-01-01 RX ADMIN — VERAPAMIL HYDROCHLORIDE 240 MG: 240 TABLET, FILM COATED, EXTENDED RELEASE ORAL at 20:23

## 2019-01-01 RX ADMIN — METOPROLOL SUCCINATE 50 MG: 50 TABLET, EXTENDED RELEASE ORAL at 20:19

## 2019-01-01 RX ADMIN — IPRATROPIUM BROMIDE AND ALBUTEROL SULFATE 3 ML: .5; 3 SOLUTION RESPIRATORY (INHALATION) at 17:10

## 2019-01-01 RX ADMIN — ENOXAPARIN SODIUM 40 MG: 100 INJECTION SUBCUTANEOUS at 08:42

## 2019-01-01 RX ADMIN — ACETAMINOPHEN 650 MG: 325 TABLET ORAL at 14:35

## 2019-01-01 RX ADMIN — ONDANSETRON 4 MG: 2 INJECTION INTRAMUSCULAR; INTRAVENOUS at 22:16

## 2019-01-01 RX ADMIN — CEFTRIAXONE SODIUM 1 G: 1 INJECTION, POWDER, FOR SOLUTION INTRAMUSCULAR; INTRAVENOUS at 18:06

## 2019-01-01 RX ADMIN — FUROSEMIDE 40 MG: 40 TABLET ORAL at 17:30

## 2019-01-01 RX ADMIN — CILOSTAZOL 50 MG: 100 TABLET ORAL at 09:33

## 2019-01-01 RX ADMIN — LOSARTAN POTASSIUM 100 MG: 100 TABLET ORAL at 08:15

## 2019-01-01 RX ADMIN — LEVOFLOXACIN 250 MG: 500 TABLET, FILM COATED ORAL at 09:14

## 2019-01-01 RX ADMIN — SODIUM CHLORIDE: 9 INJECTION, SOLUTION INTRAVENOUS at 21:33

## 2019-01-01 RX ADMIN — POTASSIUM CHLORIDE 20 MEQ: 1.5 POWDER, FOR SOLUTION ORAL at 09:13

## 2019-01-01 RX ADMIN — SODIUM CHLORIDE, PRESERVATIVE FREE 10 ML: 5 INJECTION INTRAVENOUS at 21:01

## 2019-01-01 RX ADMIN — HEPARIN SODIUM 5000 UNITS: 5000 INJECTION INTRAVENOUS; SUBCUTANEOUS at 14:09

## 2019-01-01 RX ADMIN — FUROSEMIDE 20 MG: 10 INJECTION, SOLUTION INTRAVENOUS at 18:53

## 2019-01-01 RX ADMIN — ALBUTEROL SULFATE 2.5 MG: 2.5 SOLUTION RESPIRATORY (INHALATION) at 04:58

## 2019-01-01 RX ADMIN — ASPIRIN 81 MG 81 MG: 81 TABLET ORAL at 10:01

## 2019-01-01 RX ADMIN — LEVOFLOXACIN 500 MG: 5 INJECTION, SOLUTION INTRAVENOUS at 12:55

## 2019-01-01 RX ADMIN — VERAPAMIL HYDROCHLORIDE 240 MG: 240 TABLET, FILM COATED, EXTENDED RELEASE ORAL at 08:39

## 2019-01-01 RX ADMIN — SODIUM CHLORIDE, PRESERVATIVE FREE 10 ML: 5 INJECTION INTRAVENOUS at 19:49

## 2019-01-01 RX ADMIN — FUROSEMIDE 20 MG: 10 INJECTION, SOLUTION INTRAVENOUS at 18:05

## 2019-01-01 RX ADMIN — SODIUM CHLORIDE, POTASSIUM CHLORIDE, SODIUM LACTATE AND CALCIUM CHLORIDE: 600; 310; 30; 20 INJECTION, SOLUTION INTRAVENOUS at 11:29

## 2019-01-01 RX ADMIN — METOLAZONE 2.5 MG: 2.5 TABLET ORAL at 09:28

## 2019-01-01 RX ADMIN — CILOSTAZOL 50 MG: 100 TABLET ORAL at 06:03

## 2019-01-01 RX ADMIN — OMEGA-3-ACID ETHYL ESTERS 2 G: 1 CAPSULE, LIQUID FILLED ORAL at 08:40

## 2019-01-01 RX ADMIN — ENOXAPARIN SODIUM 30 MG: 30 INJECTION SUBCUTANEOUS at 10:04

## 2019-01-01 RX ADMIN — FUROSEMIDE 40 MG: 40 TABLET ORAL at 09:28

## 2019-01-01 RX ADMIN — VERAPAMIL HYDROCHLORIDE 240 MG: 240 TABLET, FILM COATED, EXTENDED RELEASE ORAL at 09:34

## 2019-01-01 RX ADMIN — CEFEPIME 2 G: 2 INJECTION, POWDER, FOR SOLUTION INTRAVENOUS at 03:10

## 2019-01-01 RX ADMIN — MICONAZOLE NITRATE: 20 CREAM TOPICAL at 09:31

## 2019-01-01 RX ADMIN — ATORVASTATIN CALCIUM 10 MG: 10 TABLET, FILM COATED ORAL at 20:18

## 2019-01-01 RX ADMIN — INSULIN LISPRO 1 UNITS: 100 INJECTION, SOLUTION INTRAVENOUS; SUBCUTANEOUS at 20:24

## 2019-01-01 RX ADMIN — IPRATROPIUM BROMIDE AND ALBUTEROL SULFATE 3 ML: .5; 3 SOLUTION RESPIRATORY (INHALATION) at 20:29

## 2019-01-01 RX ADMIN — POTASSIUM CHLORIDE 20 MEQ: 1.5 POWDER, FOR SOLUTION ORAL at 09:12

## 2019-01-01 RX ADMIN — CILOSTAZOL 50 MG: 100 TABLET ORAL at 21:01

## 2019-01-01 RX ADMIN — HEPARIN SODIUM 5000 UNITS: 5000 INJECTION INTRAVENOUS; SUBCUTANEOUS at 05:50

## 2019-01-01 RX ADMIN — IPRATROPIUM BROMIDE AND ALBUTEROL SULFATE 3 ML: .5; 3 SOLUTION RESPIRATORY (INHALATION) at 10:59

## 2019-01-01 RX ADMIN — FUROSEMIDE 40 MG: 40 TABLET ORAL at 11:14

## 2019-01-01 RX ADMIN — SODIUM CHLORIDE, PRESERVATIVE FREE 10 ML: 5 INJECTION INTRAVENOUS at 09:50

## 2019-01-01 RX ADMIN — LOPERAMIDE HYDROCHLORIDE 2 MG: 2 CAPSULE ORAL at 05:21

## 2019-01-01 RX ADMIN — FUROSEMIDE 40 MG: 40 TABLET ORAL at 08:06

## 2019-01-01 RX ADMIN — VERAPAMIL HYDROCHLORIDE 240 MG: 240 TABLET, FILM COATED, EXTENDED RELEASE ORAL at 10:12

## 2019-01-01 RX ADMIN — VERAPAMIL HYDROCHLORIDE 240 MG: 240 TABLET, FILM COATED, EXTENDED RELEASE ORAL at 09:33

## 2019-01-01 RX ADMIN — POTASSIUM CHLORIDE 40 MEQ: 1.5 POWDER, FOR SOLUTION ORAL at 20:42

## 2019-01-01 RX ADMIN — ALBUTEROL SULFATE 2.5 MG: 2.5 SOLUTION RESPIRATORY (INHALATION) at 11:49

## 2019-01-01 RX ADMIN — OMEGA-3-ACID ETHYL ESTERS 2 G: 1 CAPSULE, LIQUID FILLED ORAL at 08:26

## 2019-01-01 RX ADMIN — ASPIRIN 81 MG 81 MG: 81 TABLET ORAL at 09:33

## 2019-01-01 RX ADMIN — FUROSEMIDE 40 MG: 10 INJECTION, SOLUTION INTRAVENOUS at 17:23

## 2019-01-01 RX ADMIN — IPRATROPIUM BROMIDE AND ALBUTEROL SULFATE 3 ML: .5; 3 SOLUTION RESPIRATORY (INHALATION) at 16:06

## 2019-01-01 RX ADMIN — OMEGA-3-ACID ETHYL ESTERS 2 G: 1 CAPSULE, LIQUID FILLED ORAL at 21:54

## 2019-01-01 RX ADMIN — POTASSIUM CHLORIDE 40 MEQ: 1.5 POWDER, FOR SOLUTION ORAL at 10:00

## 2019-01-01 RX ADMIN — SODIUM CHLORIDE, PRESERVATIVE FREE 10 ML: 5 INJECTION INTRAVENOUS at 11:14

## 2019-01-01 RX ADMIN — LOSARTAN POTASSIUM 100 MG: 100 TABLET ORAL at 18:57

## 2019-01-01 RX ADMIN — ASPIRIN 81 MG 81 MG: 81 TABLET ORAL at 09:34

## 2019-01-01 RX ADMIN — ALBUTEROL SULFATE: 2.5 SOLUTION RESPIRATORY (INHALATION) at 01:04

## 2019-01-01 RX ADMIN — CILOSTAZOL 50 MG: 100 TABLET ORAL at 08:59

## 2019-01-01 RX ADMIN — ENOXAPARIN SODIUM 30 MG: 30 INJECTION SUBCUTANEOUS at 09:38

## 2019-01-01 RX ADMIN — CILOSTAZOL 50 MG: 100 TABLET ORAL at 08:39

## 2019-01-01 RX ADMIN — ACETYLCYSTEINE 600 MG: 200 SOLUTION ORAL; RESPIRATORY (INHALATION) at 20:24

## 2019-01-01 RX ADMIN — OMEGA-3-ACID ETHYL ESTERS 2 G: 1 CAPSULE, LIQUID FILLED ORAL at 08:28

## 2019-01-01 RX ADMIN — CLINDAMYCIN HYDROCHLORIDE 600 MG: 150 CAPSULE ORAL at 14:42

## 2019-01-01 RX ADMIN — HEPARIN SODIUM 5000 UNITS: 1000 INJECTION, SOLUTION INTRAVENOUS; SUBCUTANEOUS at 12:51

## 2019-01-01 RX ADMIN — IPRATROPIUM BROMIDE AND ALBUTEROL SULFATE 3 ML: .5; 3 SOLUTION RESPIRATORY (INHALATION) at 15:51

## 2019-01-01 RX ADMIN — CLINDAMYCIN HYDROCHLORIDE 600 MG: 150 CAPSULE ORAL at 15:26

## 2019-01-01 RX ADMIN — CEFEPIME 2 G: 2 INJECTION, POWDER, FOR SOLUTION INTRAVENOUS at 15:52

## 2019-01-01 RX ADMIN — OMEGA-3-ACID ETHYL ESTERS 2 G: 1 CAPSULE, LIQUID FILLED ORAL at 08:38

## 2019-01-01 RX ADMIN — MICONAZOLE NITRATE: 20 POWDER TOPICAL at 20:47

## 2019-01-01 RX ADMIN — OMEGA-3-ACID ETHYL ESTERS 2 G: 1 CAPSULE, LIQUID FILLED ORAL at 21:18

## 2019-01-01 RX ADMIN — ASPIRIN 81 MG 81 MG: 81 TABLET ORAL at 08:14

## 2019-01-01 RX ADMIN — SODIUM CHLORIDE, PRESERVATIVE FREE 10 ML: 5 INJECTION INTRAVENOUS at 08:24

## 2019-01-01 RX ADMIN — SODIUM CHLORIDE, PRESERVATIVE FREE 10 ML: 5 INJECTION INTRAVENOUS at 19:58

## 2019-01-01 RX ADMIN — IPRATROPIUM BROMIDE AND ALBUTEROL SULFATE 3 ML: .5; 3 SOLUTION RESPIRATORY (INHALATION) at 12:16

## 2019-01-01 RX ADMIN — VERAPAMIL HYDROCHLORIDE 240 MG: 240 TABLET, FILM COATED, EXTENDED RELEASE ORAL at 10:00

## 2019-01-01 RX ADMIN — CILOSTAZOL 50 MG: 100 TABLET ORAL at 21:13

## 2019-01-01 RX ADMIN — IPRATROPIUM BROMIDE AND ALBUTEROL SULFATE 3 ML: .5; 3 SOLUTION RESPIRATORY (INHALATION) at 11:46

## 2019-01-01 RX ADMIN — LOSARTAN POTASSIUM 100 MG: 100 TABLET ORAL at 08:46

## 2019-01-01 RX ADMIN — ENOXAPARIN SODIUM 30 MG: 30 INJECTION SUBCUTANEOUS at 08:58

## 2019-01-01 RX ADMIN — CILOSTAZOL 50 MG: 100 TABLET ORAL at 21:11

## 2019-01-01 RX ADMIN — CEFEPIME 2 G: 2 INJECTION, POWDER, FOR SOLUTION INTRAVENOUS at 14:58

## 2019-01-01 RX ADMIN — SODIUM CHLORIDE, PRESERVATIVE FREE 10 ML: 5 INJECTION INTRAVENOUS at 20:02

## 2019-01-01 RX ADMIN — ACETYLCYSTEINE 600 MG: 200 SOLUTION ORAL; RESPIRATORY (INHALATION) at 19:50

## 2019-01-01 RX ADMIN — ENOXAPARIN SODIUM 30 MG: 30 INJECTION SUBCUTANEOUS at 08:43

## 2019-01-01 RX ADMIN — SODIUM CHLORIDE, PRESERVATIVE FREE 10 ML: 5 INJECTION INTRAVENOUS at 20:34

## 2019-01-01 RX ADMIN — SODIUM CHLORIDE, PRESERVATIVE FREE 10 ML: 5 INJECTION INTRAVENOUS at 10:38

## 2019-01-01 RX ADMIN — ALBUTEROL SULFATE 2.5 MG: 2.5 SOLUTION RESPIRATORY (INHALATION) at 11:50

## 2019-01-01 RX ADMIN — INSULIN LISPRO 1 UNITS: 100 INJECTION, SOLUTION INTRAVENOUS; SUBCUTANEOUS at 19:58

## 2019-01-01 RX ADMIN — ENOXAPARIN SODIUM 40 MG: 100 INJECTION SUBCUTANEOUS at 08:06

## 2019-01-01 RX ADMIN — FUROSEMIDE 40 MG: 40 TABLET ORAL at 18:57

## 2019-01-01 RX ADMIN — GUAIFENESIN AND DEXTROMETHORPHAN 5 ML: 100; 10 SYRUP ORAL at 18:14

## 2019-01-01 RX ADMIN — ACETYLCYSTEINE 600 MG: 200 SOLUTION ORAL; RESPIRATORY (INHALATION) at 20:35

## 2019-01-01 RX ADMIN — METOPROLOL SUCCINATE 25 MG: 25 TABLET, EXTENDED RELEASE ORAL at 09:16

## 2019-01-01 RX ADMIN — VANCOMYCIN HYDROCHLORIDE 750 MG: 750 INJECTION, SOLUTION INTRAVENOUS at 23:38

## 2019-01-01 RX ADMIN — CILOSTAZOL 50 MG: 100 TABLET ORAL at 20:42

## 2019-01-01 RX ADMIN — OMEGA-3-ACID ETHYL ESTERS 2 G: 1 CAPSULE, LIQUID FILLED ORAL at 10:00

## 2019-01-01 RX ADMIN — HEPARIN SODIUM 5000 UNITS: 5000 INJECTION INTRAVENOUS; SUBCUTANEOUS at 06:46

## 2019-01-01 RX ADMIN — ALBUTEROL SULFATE 2.5 MG: 2.5 SOLUTION RESPIRATORY (INHALATION) at 11:14

## 2019-01-01 RX ADMIN — FUROSEMIDE 20 MG: 10 INJECTION, SOLUTION INTRAMUSCULAR; INTRAVENOUS at 07:21

## 2019-01-01 RX ADMIN — METOPROLOL SUCCINATE 25 MG: 25 TABLET, EXTENDED RELEASE ORAL at 08:32

## 2019-01-01 RX ADMIN — SODIUM CHLORIDE, PRESERVATIVE FREE 10 ML: 5 INJECTION INTRAVENOUS at 09:31

## 2019-01-01 RX ADMIN — FUROSEMIDE 40 MG: 40 TABLET ORAL at 18:14

## 2019-01-01 RX ADMIN — MIDODRINE HYDROCHLORIDE 5 MG: 5 TABLET ORAL at 16:48

## 2019-01-01 RX ADMIN — CEFTRIAXONE SODIUM 1 G: 1 INJECTION, POWDER, FOR SOLUTION INTRAMUSCULAR; INTRAVENOUS at 17:15

## 2019-01-01 RX ADMIN — IPRATROPIUM BROMIDE AND ALBUTEROL SULFATE 1 AMPULE: .5; 3 SOLUTION RESPIRATORY (INHALATION) at 13:09

## 2019-01-01 RX ADMIN — IPRATROPIUM BROMIDE AND ALBUTEROL SULFATE 3 ML: .5; 3 SOLUTION RESPIRATORY (INHALATION) at 20:41

## 2019-01-01 RX ADMIN — ASPIRIN 81 MG 81 MG: 81 TABLET ORAL at 09:28

## 2019-01-01 RX ADMIN — FUROSEMIDE 40 MG: 10 INJECTION, SOLUTION INTRAMUSCULAR; INTRAVENOUS at 17:01

## 2019-01-01 RX ADMIN — ACETYLCYSTEINE 600 MG: 200 SOLUTION ORAL; RESPIRATORY (INHALATION) at 20:01

## 2019-01-01 RX ADMIN — MICONAZOLE NITRATE: 20 POWDER TOPICAL at 10:02

## 2019-01-01 RX ADMIN — ACETYLCYSTEINE 600 MG: 200 SOLUTION ORAL; RESPIRATORY (INHALATION) at 07:54

## 2019-01-01 RX ADMIN — METOPROLOL SUCCINATE 25 MG: 25 TABLET, EXTENDED RELEASE ORAL at 08:46

## 2019-01-01 RX ADMIN — SODIUM CHLORIDE, PRESERVATIVE FREE 10 ML: 5 INJECTION INTRAVENOUS at 08:25

## 2019-01-01 RX ADMIN — ASPIRIN 81 MG 81 MG: 81 TABLET ORAL at 08:58

## 2019-01-01 RX ADMIN — CEFEPIME 2 G: 2 INJECTION, POWDER, FOR SOLUTION INTRAVENOUS at 02:59

## 2019-01-01 RX ADMIN — IPRATROPIUM BROMIDE AND ALBUTEROL SULFATE 3 ML: .5; 3 SOLUTION RESPIRATORY (INHALATION) at 08:50

## 2019-01-01 RX ADMIN — FUROSEMIDE 40 MG: 40 TABLET ORAL at 08:40

## 2019-01-01 RX ADMIN — VERAPAMIL HYDROCHLORIDE 240 MG: 240 TABLET, FILM COATED, EXTENDED RELEASE ORAL at 19:59

## 2019-01-01 RX ADMIN — SODIUM CHLORIDE, PRESERVATIVE FREE 10 ML: 5 INJECTION INTRAVENOUS at 08:41

## 2019-01-01 RX ADMIN — IPRATROPIUM BROMIDE AND ALBUTEROL SULFATE 3 ML: .5; 3 SOLUTION RESPIRATORY (INHALATION) at 11:36

## 2019-01-01 RX ADMIN — MIDODRINE HYDROCHLORIDE 5 MG: 5 TABLET ORAL at 14:24

## 2019-01-01 RX ADMIN — ONDANSETRON 4 MG: 2 INJECTION INTRAMUSCULAR; INTRAVENOUS at 11:32

## 2019-01-01 RX ADMIN — ATORVASTATIN CALCIUM 10 MG: 10 TABLET, FILM COATED ORAL at 20:12

## 2019-01-01 RX ADMIN — CILOSTAZOL 50 MG: 100 TABLET ORAL at 08:33

## 2019-01-01 RX ADMIN — FUROSEMIDE 20 MG: 10 INJECTION, SOLUTION INTRAVENOUS at 09:50

## 2019-01-01 RX ADMIN — ASPIRIN 81 MG 81 MG: 81 TABLET ORAL at 08:40

## 2019-01-01 RX ADMIN — METOPROLOL SUCCINATE 25 MG: 25 TABLET, EXTENDED RELEASE ORAL at 09:33

## 2019-01-01 RX ADMIN — METOPROLOL SUCCINATE 25 MG: 25 TABLET, EXTENDED RELEASE ORAL at 09:00

## 2019-01-01 RX ADMIN — METOPROLOL SUCCINATE 25 MG: 25 TABLET, EXTENDED RELEASE ORAL at 10:28

## 2019-01-01 RX ADMIN — OMEGA-3-ACID ETHYL ESTERS 2 G: 1 CAPSULE, LIQUID FILLED ORAL at 09:27

## 2019-01-01 RX ADMIN — GUAIFENESIN 600 MG: 600 TABLET, EXTENDED RELEASE ORAL at 13:59

## 2019-01-01 RX ADMIN — POTASSIUM CHLORIDE 20 MEQ: 1.5 POWDER, FOR SOLUTION ORAL at 09:55

## 2019-01-01 RX ADMIN — LOSARTAN POTASSIUM 100 MG: 100 TABLET, FILM COATED ORAL at 17:33

## 2019-01-01 RX ADMIN — ASPIRIN 81 MG 81 MG: 81 TABLET ORAL at 09:45

## 2019-01-01 RX ADMIN — POTASSIUM CHLORIDE 20 MEQ: 1.5 POWDER, FOR SOLUTION ORAL at 09:03

## 2019-01-01 RX ADMIN — METOPROLOL SUCCINATE 50 MG: 50 TABLET, EXTENDED RELEASE ORAL at 08:26

## 2019-01-01 RX ADMIN — IPRATROPIUM BROMIDE AND ALBUTEROL SULFATE 3 ML: .5; 3 SOLUTION RESPIRATORY (INHALATION) at 10:14

## 2019-01-01 RX ADMIN — HEPARIN SODIUM 5000 UNITS: 5000 INJECTION INTRAVENOUS; SUBCUTANEOUS at 20:23

## 2019-01-01 RX ADMIN — ALBUTEROL SULFATE 2.5 MG: 2.5 SOLUTION RESPIRATORY (INHALATION) at 15:09

## 2019-01-01 RX ADMIN — CILOSTAZOL 50 MG: 100 TABLET ORAL at 06:20

## 2019-01-01 RX ADMIN — LOSARTAN POTASSIUM 100 MG: 100 TABLET, FILM COATED ORAL at 09:12

## 2019-01-01 RX ADMIN — ALBUTEROL SULFATE 2.5 MG: 2.5 SOLUTION RESPIRATORY (INHALATION) at 16:25

## 2019-01-01 RX ADMIN — FUROSEMIDE 40 MG: 40 TABLET ORAL at 18:01

## 2019-01-01 RX ADMIN — ATORVASTATIN CALCIUM 10 MG: 10 TABLET, FILM COATED ORAL at 21:14

## 2019-01-01 RX ADMIN — AZITHROMYCIN DIHYDRATE 500 MG: 500 INJECTION, POWDER, LYOPHILIZED, FOR SOLUTION INTRAVENOUS at 21:25

## 2019-01-01 RX ADMIN — OMEGA-3-ACID ETHYL ESTERS 2 G: 1 CAPSULE, LIQUID FILLED ORAL at 23:47

## 2019-01-01 RX ADMIN — METOPROLOL SUCCINATE 25 MG: 25 TABLET, EXTENDED RELEASE ORAL at 09:28

## 2019-01-01 RX ADMIN — ALBUTEROL SULFATE 1.25 MG: 2.5 SOLUTION RESPIRATORY (INHALATION) at 04:09

## 2019-01-01 RX ADMIN — MIDODRINE HYDROCHLORIDE 5 MG: 5 TABLET ORAL at 09:32

## 2019-01-01 RX ADMIN — FUROSEMIDE 40 MG: 10 INJECTION, SOLUTION INTRAVENOUS at 18:02

## 2019-01-01 RX ADMIN — CILOSTAZOL 50 MG: 100 TABLET ORAL at 10:00

## 2019-01-01 RX ADMIN — CILOSTAZOL 50 MG: 100 TABLET ORAL at 20:20

## 2019-01-01 RX ADMIN — IPRATROPIUM BROMIDE AND ALBUTEROL SULFATE 3 ML: .5; 3 SOLUTION RESPIRATORY (INHALATION) at 07:08

## 2019-01-01 RX ADMIN — VERAPAMIL HYDROCHLORIDE 240 MG: 240 TABLET, FILM COATED, EXTENDED RELEASE ORAL at 19:37

## 2019-01-01 RX ADMIN — POTASSIUM CHLORIDE 40 MEQ: 1.5 POWDER, FOR SOLUTION ORAL at 09:30

## 2019-01-01 RX ADMIN — ACETYLCYSTEINE 600 MG: 200 SOLUTION ORAL; RESPIRATORY (INHALATION) at 19:43

## 2019-01-01 RX ADMIN — OMEGA-3-ACID ETHYL ESTERS 2 G: 1 CAPSULE, LIQUID FILLED ORAL at 21:01

## 2019-01-01 RX ADMIN — HEPARIN SODIUM 5000 UNITS: 5000 INJECTION INTRAVENOUS; SUBCUTANEOUS at 00:11

## 2019-01-01 RX ADMIN — OMEGA-3-ACID ETHYL ESTERS 2 G: 1 CAPSULE, LIQUID FILLED ORAL at 08:59

## 2019-01-01 RX ADMIN — SODIUM CHLORIDE, PRESERVATIVE FREE 10 ML: 5 INJECTION INTRAVENOUS at 08:06

## 2019-01-01 RX ADMIN — SUGAMMADEX 200 MG: 100 INJECTION, SOLUTION INTRAVENOUS at 14:22

## 2019-01-01 RX ADMIN — ACETAMINOPHEN 650 MG: 325 TABLET ORAL at 17:17

## 2019-01-01 RX ADMIN — FUROSEMIDE 40 MG: 40 TABLET ORAL at 17:13

## 2019-01-01 RX ADMIN — IPRATROPIUM BROMIDE AND ALBUTEROL SULFATE 3 ML: .5; 3 SOLUTION RESPIRATORY (INHALATION) at 06:47

## 2019-01-01 RX ADMIN — METOLAZONE 2.5 MG: 5 TABLET ORAL at 10:00

## 2019-01-01 RX ADMIN — METOPROLOL SUCCINATE 25 MG: 25 TABLET, EXTENDED RELEASE ORAL at 21:11

## 2019-01-01 RX ADMIN — ACETYLCYSTEINE 600 MG: 200 SOLUTION ORAL; RESPIRATORY (INHALATION) at 20:08

## 2019-01-01 RX ADMIN — ONDANSETRON HYDROCHLORIDE 4 MG: 2 INJECTION, SOLUTION INTRAMUSCULAR; INTRAVENOUS at 13:21

## 2019-01-01 RX ADMIN — ATORVASTATIN CALCIUM 10 MG: 10 TABLET, FILM COATED ORAL at 09:16

## 2019-01-01 RX ADMIN — CILOSTAZOL 50 MG: 100 TABLET ORAL at 19:00

## 2019-01-01 RX ADMIN — IPRATROPIUM BROMIDE AND ALBUTEROL SULFATE 3 ML: .5; 3 SOLUTION RESPIRATORY (INHALATION) at 08:59

## 2019-01-01 RX ADMIN — CLINDAMYCIN HYDROCHLORIDE 600 MG: 150 CAPSULE ORAL at 07:04

## 2019-01-01 RX ADMIN — VERAPAMIL HYDROCHLORIDE 240 MG: 240 TABLET, FILM COATED, EXTENDED RELEASE ORAL at 08:37

## 2019-01-01 RX ADMIN — AZITHROMYCIN MONOHYDRATE 500 MG: 500 INJECTION, POWDER, LYOPHILIZED, FOR SOLUTION INTRAVENOUS at 13:11

## 2019-01-01 RX ADMIN — VERAPAMIL HYDROCHLORIDE 240 MG: 240 TABLET, FILM COATED, EXTENDED RELEASE ORAL at 10:13

## 2019-01-01 RX ADMIN — AZITHROMYCIN DIHYDRATE 500 MG: 500 INJECTION, POWDER, LYOPHILIZED, FOR SOLUTION INTRAVENOUS at 20:24

## 2019-01-01 RX ADMIN — FUROSEMIDE 20 MG: 10 INJECTION INTRAMUSCULAR; INTRAVENOUS at 07:21

## 2019-01-01 RX ADMIN — METOLAZONE 2.5 MG: 5 TABLET ORAL at 08:34

## 2019-01-01 RX ADMIN — POTASSIUM CHLORIDE 40 MEQ: 1.5 POWDER, FOR SOLUTION ORAL at 21:16

## 2019-01-01 RX ADMIN — IPRATROPIUM BROMIDE AND ALBUTEROL SULFATE 3 ML: .5; 3 SOLUTION RESPIRATORY (INHALATION) at 19:46

## 2019-01-01 RX ADMIN — CLINDAMYCIN HYDROCHLORIDE 600 MG: 150 CAPSULE ORAL at 20:51

## 2019-01-01 RX ADMIN — ALBUTEROL SULFATE 2.5 MG: 2.5 SOLUTION RESPIRATORY (INHALATION) at 11:25

## 2019-01-01 RX ADMIN — CILOSTAZOL 50 MG: 100 TABLET ORAL at 06:30

## 2019-01-01 RX ADMIN — DOXYCYCLINE HYCLATE 100 MG: 100 TABLET, COATED ORAL at 20:56

## 2019-01-01 RX ADMIN — DOXYCYCLINE HYCLATE 100 MG: 100 TABLET, COATED ORAL at 08:40

## 2019-01-01 RX ADMIN — VANCOMYCIN HYDROCHLORIDE 750 MG: 750 INJECTION, SOLUTION INTRAVENOUS at 22:38

## 2019-01-01 RX ADMIN — MICONAZOLE NITRATE: 20 POWDER TOPICAL at 21:20

## 2019-01-01 RX ADMIN — PHENYLEPHRINE HYDROCHLORIDE 100 MCG: 10 INJECTION INTRAVENOUS at 12:03

## 2019-01-01 RX ADMIN — SODIUM CHLORIDE, PRESERVATIVE FREE 10 ML: 5 INJECTION INTRAVENOUS at 09:44

## 2019-01-01 RX ADMIN — GUAIFENESIN AND DEXTROMETHORPHAN 5 ML: 100; 10 SYRUP ORAL at 22:17

## 2019-01-01 RX ADMIN — IPRATROPIUM BROMIDE AND ALBUTEROL SULFATE 3 ML: .5; 3 SOLUTION RESPIRATORY (INHALATION) at 16:38

## 2019-01-01 RX ADMIN — SODIUM CHLORIDE, PRESERVATIVE FREE 10 ML: 5 INJECTION INTRAVENOUS at 08:38

## 2019-01-01 RX ADMIN — FUROSEMIDE 40 MG: 40 TABLET ORAL at 09:41

## 2019-01-01 RX ADMIN — METOPROLOL SUCCINATE 25 MG: 25 TABLET, EXTENDED RELEASE ORAL at 10:01

## 2019-01-01 RX ADMIN — ATORVASTATIN CALCIUM 10 MG: 10 TABLET, FILM COATED ORAL at 08:26

## 2019-01-01 RX ADMIN — OMEGA-3-ACID ETHYL ESTERS 2 G: 1 CAPSULE, LIQUID FILLED ORAL at 08:05

## 2019-01-01 RX ADMIN — INSULIN LISPRO 2 UNITS: 100 INJECTION, SOLUTION INTRAVENOUS; SUBCUTANEOUS at 19:38

## 2019-01-01 RX ADMIN — ALBUTEROL SULFATE 2.5 MG: 2.5 SOLUTION RESPIRATORY (INHALATION) at 15:25

## 2019-01-01 RX ADMIN — ACETAMINOPHEN 650 MG: 325 TABLET ORAL at 23:46

## 2019-01-01 RX ADMIN — Medication 6 MILLICURIE: at 11:15

## 2019-01-01 RX ADMIN — PROPOFOL 80 MG: 10 INJECTION, EMULSION INTRAVENOUS at 11:32

## 2019-01-01 RX ADMIN — OMEGA-3-ACID ETHYL ESTERS 2 G: 1 CAPSULE, LIQUID FILLED ORAL at 21:11

## 2019-01-01 RX ADMIN — CILOSTAZOL 50 MG: 100 TABLET ORAL at 09:00

## 2019-01-01 RX ADMIN — CEFTRIAXONE SODIUM 1 G: 1 INJECTION, POWDER, FOR SOLUTION INTRAMUSCULAR; INTRAVENOUS at 17:56

## 2019-01-01 RX ADMIN — LEVOFLOXACIN 500 MG: 5 INJECTION, SOLUTION INTRAVENOUS at 11:55

## 2019-01-01 RX ADMIN — LEVOFLOXACIN 250 MG: 500 TABLET, FILM COATED ORAL at 08:59

## 2019-01-01 RX ADMIN — ACETYLCYSTEINE 600 MG: 200 SOLUTION ORAL; RESPIRATORY (INHALATION) at 15:38

## 2019-01-01 RX ADMIN — IPRATROPIUM BROMIDE AND ALBUTEROL SULFATE 3 ML: .5; 3 SOLUTION RESPIRATORY (INHALATION) at 14:38

## 2019-01-01 RX ADMIN — IPRATROPIUM BROMIDE AND ALBUTEROL SULFATE 3 ML: .5; 3 SOLUTION RESPIRATORY (INHALATION) at 19:34

## 2019-01-01 RX ADMIN — IPRATROPIUM BROMIDE AND ALBUTEROL SULFATE 3 ML: .5; 3 SOLUTION RESPIRATORY (INHALATION) at 08:22

## 2019-01-01 RX ADMIN — CEFTRIAXONE SODIUM 1 G: 1 INJECTION, POWDER, FOR SOLUTION INTRAMUSCULAR; INTRAVENOUS at 10:38

## 2019-01-01 RX ADMIN — CEFTRIAXONE SODIUM 1 G: 1 INJECTION, POWDER, FOR SOLUTION INTRAMUSCULAR; INTRAVENOUS at 17:14

## 2019-01-01 RX ADMIN — LOPERAMIDE HYDROCHLORIDE 2 MG: 2 CAPSULE ORAL at 02:54

## 2019-01-01 RX ADMIN — FUROSEMIDE 20 MG: 10 INJECTION, SOLUTION INTRAMUSCULAR; INTRAVENOUS at 19:00

## 2019-01-01 RX ADMIN — FUROSEMIDE 20 MG: 10 INJECTION, SOLUTION INTRAMUSCULAR; INTRAVENOUS at 18:00

## 2019-01-01 RX ADMIN — ASPIRIN 81 MG: 81 TABLET, COATED ORAL at 10:09

## 2019-01-01 RX ADMIN — SODIUM CHLORIDE, PRESERVATIVE FREE 10 ML: 5 INJECTION INTRAVENOUS at 21:16

## 2019-01-01 RX ADMIN — SUCCINYLCHOLINE CHLORIDE 200 MG: 20 INJECTION, SOLUTION INTRAMUSCULAR; INTRAVENOUS at 11:32

## 2019-01-01 RX ADMIN — PHENYLEPHRINE HYDROCHLORIDE 100 MCG: 10 INJECTION INTRAVENOUS at 12:27

## 2019-01-01 RX ADMIN — POTASSIUM CHLORIDE 40 MEQ: 1.5 POWDER, FOR SOLUTION ORAL at 20:55

## 2019-01-01 RX ADMIN — FUROSEMIDE 40 MG: 40 TABLET ORAL at 08:29

## 2019-01-01 RX ADMIN — SODIUM CHLORIDE, PRESERVATIVE FREE 10 ML: 5 INJECTION INTRAVENOUS at 10:10

## 2019-01-01 RX ADMIN — METOPROLOL SUCCINATE 25 MG: 25 TABLET, EXTENDED RELEASE ORAL at 11:14

## 2019-01-01 RX ADMIN — ATORVASTATIN CALCIUM 10 MG: 10 TABLET, FILM COATED ORAL at 09:34

## 2019-01-01 RX ADMIN — ASPIRIN 81 MG 81 MG: 81 TABLET ORAL at 08:38

## 2019-01-01 RX ADMIN — ALBUTEROL SULFATE 2.5 MG: 2.5 SOLUTION RESPIRATORY (INHALATION) at 20:02

## 2019-01-01 RX ADMIN — CEFEPIME 2 G: 2 INJECTION, POWDER, FOR SOLUTION INTRAVENOUS at 03:30

## 2019-01-01 RX ADMIN — CEFAZOLIN SODIUM 2 G: 2 INJECTION, SOLUTION INTRAVENOUS at 20:30

## 2019-01-01 RX ADMIN — OMEGA-3-ACID ETHYL ESTERS 2 G: 1 CAPSULE, LIQUID FILLED ORAL at 22:30

## 2019-01-01 RX ADMIN — HEPARIN SODIUM 5000 UNITS: 5000 INJECTION INTRAVENOUS; SUBCUTANEOUS at 06:04

## 2019-01-01 RX ADMIN — ASPIRIN 81 MG 81 MG: 81 TABLET ORAL at 09:30

## 2019-01-01 RX ADMIN — IPRATROPIUM BROMIDE AND ALBUTEROL SULFATE 3 ML: .5; 3 SOLUTION RESPIRATORY (INHALATION) at 15:40

## 2019-01-01 RX ADMIN — CILOSTAZOL 50 MG: 100 TABLET ORAL at 20:56

## 2019-01-01 RX ADMIN — SODIUM CHLORIDE, PRESERVATIVE FREE 10 ML: 5 INJECTION INTRAVENOUS at 08:46

## 2019-01-01 RX ADMIN — ATORVASTATIN CALCIUM 10 MG: 10 TABLET, FILM COATED ORAL at 20:51

## 2019-01-01 RX ADMIN — AZITHROMYCIN MONOHYDRATE 500 MG: 500 INJECTION, POWDER, LYOPHILIZED, FOR SOLUTION INTRAVENOUS at 09:28

## 2019-01-01 RX ADMIN — SODIUM CHLORIDE, PRESERVATIVE FREE 10 ML: 5 INJECTION INTRAVENOUS at 08:40

## 2019-01-01 RX ADMIN — IPRATROPIUM BROMIDE AND ALBUTEROL SULFATE 3 ML: .5; 3 SOLUTION RESPIRATORY (INHALATION) at 20:30

## 2019-01-01 RX ADMIN — ENOXAPARIN SODIUM 30 MG: 30 INJECTION SUBCUTANEOUS at 09:31

## 2019-01-01 RX ADMIN — IPRATROPIUM BROMIDE AND ALBUTEROL SULFATE 3 ML: .5; 3 SOLUTION RESPIRATORY (INHALATION) at 07:40

## 2019-01-01 RX ADMIN — POTASSIUM CHLORIDE 20 MEQ: 1.5 POWDER, FOR SOLUTION ORAL at 09:44

## 2019-01-01 RX ADMIN — SODIUM CHLORIDE, PRESERVATIVE FREE 10 ML: 5 INJECTION INTRAVENOUS at 20:19

## 2019-01-01 RX ADMIN — ATORVASTATIN CALCIUM 10 MG: 10 TABLET, FILM COATED ORAL at 08:34

## 2019-01-01 RX ADMIN — ASPIRIN 81 MG 81 MG: 81 TABLET ORAL at 08:46

## 2019-01-01 RX ADMIN — SODIUM CHLORIDE, PRESERVATIVE FREE 10 ML: 5 INJECTION INTRAVENOUS at 21:04

## 2019-01-01 RX ADMIN — ACETAZOLAMIDE 250 MG: 500 INJECTION, POWDER, LYOPHILIZED, FOR SOLUTION INTRAVENOUS at 13:07

## 2019-01-01 RX ADMIN — AMOXICILLIN AND CLAVULANATE POTASSIUM 1 TABLET: 875; 125 TABLET, FILM COATED ORAL at 09:41

## 2019-01-01 RX ADMIN — IPRATROPIUM BROMIDE AND ALBUTEROL SULFATE 3 ML: .5; 3 SOLUTION RESPIRATORY (INHALATION) at 07:21

## 2019-01-01 RX ADMIN — CLINDAMYCIN HYDROCHLORIDE 600 MG: 150 CAPSULE ORAL at 21:14

## 2019-01-01 RX ADMIN — LOPERAMIDE HYDROCHLORIDE 2 MG: 2 CAPSULE ORAL at 00:04

## 2019-01-01 RX ADMIN — FAMOTIDINE 20 MG: 20 TABLET, FILM COATED ORAL at 21:23

## 2019-01-01 RX ADMIN — POTASSIUM CHLORIDE 20 MEQ: 1.5 POWDER, FOR SOLUTION ORAL at 08:26

## 2019-01-01 RX ADMIN — VERAPAMIL HYDROCHLORIDE 240 MG: 240 TABLET, FILM COATED, EXTENDED RELEASE ORAL at 19:57

## 2019-01-01 RX ADMIN — SODIUM CHLORIDE, PRESERVATIVE FREE 10 ML: 5 INJECTION INTRAVENOUS at 09:56

## 2019-01-01 RX ADMIN — IPRATROPIUM BROMIDE AND ALBUTEROL SULFATE 3 ML: .5; 3 SOLUTION RESPIRATORY (INHALATION) at 11:51

## 2019-01-01 RX ADMIN — ALBUTEROL SULFATE 1.25 MG: 2.5 SOLUTION RESPIRATORY (INHALATION) at 22:37

## 2019-01-01 RX ADMIN — ALBUTEROL SULFATE 2.5 MG: 2.5 SOLUTION RESPIRATORY (INHALATION) at 07:31

## 2019-01-01 RX ADMIN — OMEGA-3-ACID ETHYL ESTERS 2 G: 1 CAPSULE, LIQUID FILLED ORAL at 09:41

## 2019-01-01 RX ADMIN — IPRATROPIUM BROMIDE AND ALBUTEROL SULFATE 3 ML: .5; 3 SOLUTION RESPIRATORY (INHALATION) at 16:08

## 2019-01-01 RX ADMIN — HEPARIN SODIUM 5000 UNITS: 5000 INJECTION INTRAVENOUS; SUBCUTANEOUS at 06:21

## 2019-01-01 RX ADMIN — ENOXAPARIN SODIUM 30 MG: 30 INJECTION SUBCUTANEOUS at 08:39

## 2019-01-01 RX ADMIN — DEXAMETHASONE SODIUM PHOSPHATE 4 MG: 4 INJECTION, SOLUTION INTRAMUSCULAR; INTRAVENOUS at 12:50

## 2019-01-01 RX ADMIN — CILOSTAZOL 50 MG: 100 TABLET ORAL at 17:15

## 2019-01-01 RX ADMIN — FUROSEMIDE 60 MG: 10 INJECTION, SOLUTION INTRAMUSCULAR; INTRAVENOUS at 18:15

## 2019-01-01 RX ADMIN — SODIUM CHLORIDE, PRESERVATIVE FREE 10 ML: 5 INJECTION INTRAVENOUS at 10:02

## 2019-01-01 RX ADMIN — ALBUTEROL SULFATE 2.5 MG: 2.5 SOLUTION RESPIRATORY (INHALATION) at 19:49

## 2019-01-01 RX ADMIN — DOXYCYCLINE HYCLATE 100 MG: 100 TABLET, COATED ORAL at 21:11

## 2019-01-01 RX ADMIN — METHYLPREDNISOLONE SODIUM SUCCINATE 40 MG: 40 INJECTION, POWDER, FOR SOLUTION INTRAMUSCULAR; INTRAVENOUS at 09:35

## 2019-01-01 RX ADMIN — SODIUM CHLORIDE, PRESERVATIVE FREE 10 ML: 5 INJECTION INTRAVENOUS at 09:13

## 2019-01-01 RX ADMIN — ATORVASTATIN CALCIUM 10 MG: 10 TABLET, FILM COATED ORAL at 08:58

## 2019-01-01 RX ADMIN — METOPROLOL SUCCINATE 25 MG: 25 TABLET, EXTENDED RELEASE ORAL at 09:30

## 2019-01-01 RX ADMIN — ACETAMINOPHEN 650 MG: 325 TABLET ORAL at 21:44

## 2019-01-01 RX ADMIN — DOXYCYCLINE HYCLATE 100 MG: 100 TABLET, COATED ORAL at 09:56

## 2019-01-01 RX ADMIN — VERAPAMIL HYDROCHLORIDE 240 MG: 240 TABLET, FILM COATED, EXTENDED RELEASE ORAL at 09:40

## 2019-01-01 RX ADMIN — CILOSTAZOL 50 MG: 100 TABLET ORAL at 21:18

## 2019-01-01 RX ADMIN — ALBUTEROL SULFATE 2.5 MG: 2.5 SOLUTION RESPIRATORY (INHALATION) at 19:50

## 2019-01-01 RX ADMIN — IPRATROPIUM BROMIDE AND ALBUTEROL SULFATE 1 AMPULE: .5; 3 SOLUTION RESPIRATORY (INHALATION) at 18:30

## 2019-01-01 RX ADMIN — GUAIFENESIN AND DEXTROMETHORPHAN 5 ML: 100; 10 SYRUP ORAL at 21:44

## 2019-01-01 RX ADMIN — METOPROLOL SUCCINATE 50 MG: 50 TABLET, EXTENDED RELEASE ORAL at 10:09

## 2019-01-01 RX ADMIN — METOPROLOL SUCCINATE 25 MG: 25 TABLET, EXTENDED RELEASE ORAL at 20:23

## 2019-01-01 RX ADMIN — MIDODRINE HYDROCHLORIDE 5 MG: 5 TABLET ORAL at 12:12

## 2019-01-01 RX ADMIN — IPRATROPIUM BROMIDE AND ALBUTEROL SULFATE 3 ML: .5; 3 SOLUTION RESPIRATORY (INHALATION) at 21:08

## 2019-01-01 RX ADMIN — SODIUM CHLORIDE, PRESERVATIVE FREE 10 ML: 5 INJECTION INTRAVENOUS at 09:43

## 2019-01-01 RX ADMIN — METOPROLOL SUCCINATE 25 MG: 25 TABLET, EXTENDED RELEASE ORAL at 21:46

## 2019-01-01 RX ADMIN — ALBUTEROL SULFATE 2.5 MG: 2.5 SOLUTION RESPIRATORY (INHALATION) at 07:26

## 2019-01-01 RX ADMIN — FUROSEMIDE 40 MG: 40 TABLET ORAL at 10:00

## 2019-01-01 RX ADMIN — ACETAMINOPHEN 650 MG: 325 TABLET ORAL at 22:39

## 2019-01-01 RX ADMIN — MAGNESIUM SULFATE HEPTAHYDRATE 2 G: 40 INJECTION, SOLUTION INTRAVENOUS at 13:47

## 2019-01-01 RX ADMIN — SODIUM CHLORIDE: 9 INJECTION, SOLUTION INTRAVENOUS at 07:29

## 2019-01-01 RX ADMIN — METOLAZONE 2.5 MG: 2.5 TABLET ORAL at 10:02

## 2019-01-01 RX ADMIN — LOSARTAN POTASSIUM 100 MG: 100 TABLET ORAL at 08:25

## 2019-01-01 RX ADMIN — IPRATROPIUM BROMIDE AND ALBUTEROL SULFATE 3 ML: .5; 3 SOLUTION RESPIRATORY (INHALATION) at 11:18

## 2019-01-01 RX ADMIN — ACETYLCYSTEINE 600 MG: 200 SOLUTION ORAL; RESPIRATORY (INHALATION) at 08:59

## 2019-01-01 RX ADMIN — IPRATROPIUM BROMIDE AND ALBUTEROL SULFATE 3 ML: .5; 3 SOLUTION RESPIRATORY (INHALATION) at 07:46

## 2019-01-01 RX ADMIN — ALBUTEROL SULFATE 2.5 MG: 2.5 SOLUTION RESPIRATORY (INHALATION) at 10:37

## 2019-01-01 RX ADMIN — KIT FOR THE PREPARATION OF TECHNETIUM TC 99M PENTETATE 40 MILLICURIE: 20 INJECTION, POWDER, LYOPHILIZED, FOR SOLUTION INTRAVENOUS; RESPIRATORY (INHALATION) at 11:00

## 2019-01-01 RX ADMIN — SODIUM CHLORIDE, PRESERVATIVE FREE 10 ML: 5 INJECTION INTRAVENOUS at 11:55

## 2019-01-01 RX ADMIN — ATORVASTATIN CALCIUM 10 MG: 10 TABLET, FILM COATED ORAL at 08:06

## 2019-01-01 RX ADMIN — METOPROLOL SUCCINATE 50 MG: 50 TABLET, EXTENDED RELEASE ORAL at 21:14

## 2019-01-01 RX ADMIN — FUROSEMIDE 40 MG: 40 TABLET ORAL at 17:21

## 2019-01-01 RX ADMIN — METOPROLOL SUCCINATE 25 MG: 25 TABLET, EXTENDED RELEASE ORAL at 21:01

## 2019-01-01 RX ADMIN — VERAPAMIL HYDROCHLORIDE 240 MG: 240 TABLET, FILM COATED, EXTENDED RELEASE ORAL at 08:05

## 2019-01-01 RX ADMIN — GUAIFENESIN AND DEXTROMETHORPHAN 5 ML: 100; 10 SYRUP ORAL at 01:01

## 2019-01-01 RX ADMIN — CILOSTAZOL 50 MG: 100 TABLET ORAL at 08:25

## 2019-01-01 RX ADMIN — CLINDAMYCIN HYDROCHLORIDE 600 MG: 150 CAPSULE ORAL at 13:59

## 2019-01-01 RX ADMIN — ENOXAPARIN SODIUM 30 MG: 30 INJECTION SUBCUTANEOUS at 09:33

## 2019-01-01 RX ADMIN — METOPROLOL SUCCINATE 50 MG: 50 TABLET, EXTENDED RELEASE ORAL at 09:03

## 2019-01-01 RX ADMIN — FUROSEMIDE 60 MG: 10 INJECTION, SOLUTION INTRAMUSCULAR; INTRAVENOUS at 17:13

## 2019-01-01 RX ADMIN — CILOSTAZOL 50 MG: 100 TABLET ORAL at 10:01

## 2019-01-01 RX ADMIN — Medication: at 12:23

## 2019-01-01 RX ADMIN — ATORVASTATIN CALCIUM 10 MG: 10 TABLET, FILM COATED ORAL at 08:38

## 2019-01-01 RX ADMIN — ATORVASTATIN CALCIUM 10 MG: 10 TABLET, FILM COATED ORAL at 20:19

## 2019-01-01 RX ADMIN — ACETYLCYSTEINE 600 MG: 200 SOLUTION ORAL; RESPIRATORY (INHALATION) at 08:01

## 2019-01-01 RX ADMIN — VERAPAMIL HYDROCHLORIDE 240 MG: 240 TABLET, FILM COATED, EXTENDED RELEASE ORAL at 21:23

## 2019-01-01 RX ADMIN — FUROSEMIDE 20 MG: 10 INJECTION, SOLUTION INTRAMUSCULAR; INTRAVENOUS at 17:14

## 2019-01-01 RX ADMIN — FUROSEMIDE 40 MG: 40 TABLET ORAL at 17:48

## 2019-01-01 RX ADMIN — SODIUM CHLORIDE, PRESERVATIVE FREE 10 ML: 5 INJECTION INTRAVENOUS at 08:34

## 2019-01-01 RX ADMIN — SODIUM CHLORIDE, PRESERVATIVE FREE 10 ML: 5 INJECTION INTRAVENOUS at 21:14

## 2019-01-01 RX ADMIN — ALBUTEROL SULFATE 2.5 MG: 2.5 SOLUTION RESPIRATORY (INHALATION) at 15:33

## 2019-01-01 RX ADMIN — CEFEPIME 2 G: 2 INJECTION, POWDER, FOR SOLUTION INTRAVENOUS at 02:49

## 2019-01-01 RX ADMIN — HEPARIN SODIUM 5000 UNITS: 5000 INJECTION INTRAVENOUS; SUBCUTANEOUS at 21:34

## 2019-01-01 RX ADMIN — VERAPAMIL HYDROCHLORIDE 240 MG: 240 TABLET, FILM COATED, EXTENDED RELEASE ORAL at 19:47

## 2019-01-01 RX ADMIN — CEFAZOLIN SODIUM 2 G: 2 INJECTION, SOLUTION INTRAVENOUS at 03:19

## 2019-01-01 RX ADMIN — ASPIRIN 81 MG 81 MG: 81 TABLET ORAL at 08:28

## 2019-01-01 RX ADMIN — FENTANYL CITRATE 25 MCG: 50 INJECTION INTRAMUSCULAR; INTRAVENOUS at 15:35

## 2019-01-01 RX ADMIN — IPRATROPIUM BROMIDE AND ALBUTEROL SULFATE 3 ML: .5; 3 SOLUTION RESPIRATORY (INHALATION) at 12:08

## 2019-01-01 RX ADMIN — CILOSTAZOL 50 MG: 100 TABLET ORAL at 22:30

## 2019-01-01 RX ADMIN — METOPROLOL SUCCINATE 25 MG: 25 TABLET, EXTENDED RELEASE ORAL at 09:45

## 2019-01-01 RX ADMIN — SODIUM CHLORIDE, PRESERVATIVE FREE 10 ML: 5 INJECTION INTRAVENOUS at 08:28

## 2019-01-01 RX ADMIN — LEVOFLOXACIN 500 MG: 500 TABLET, FILM COATED ORAL at 13:06

## 2019-01-01 RX ADMIN — ATORVASTATIN CALCIUM 10 MG: 10 TABLET, FILM COATED ORAL at 09:30

## 2019-01-01 RX ADMIN — VERAPAMIL HYDROCHLORIDE 240 MG: 240 TABLET, FILM COATED, EXTENDED RELEASE ORAL at 09:44

## 2019-01-01 RX ADMIN — IPRATROPIUM BROMIDE AND ALBUTEROL SULFATE 3 ML: .5; 3 SOLUTION RESPIRATORY (INHALATION) at 19:50

## 2019-01-01 RX ADMIN — ATORVASTATIN CALCIUM 10 MG: 10 TABLET, FILM COATED ORAL at 09:33

## 2019-01-01 RX ADMIN — ACETYLCYSTEINE 600 MG: 200 SOLUTION ORAL; RESPIRATORY (INHALATION) at 20:30

## 2019-01-01 RX ADMIN — ENOXAPARIN SODIUM 30 MG: 30 INJECTION SUBCUTANEOUS at 09:42

## 2019-01-01 RX ADMIN — CILOSTAZOL 50 MG: 100 TABLET ORAL at 21:47

## 2019-01-01 RX ADMIN — VANCOMYCIN HYDROCHLORIDE 750 MG: 750 INJECTION, SOLUTION INTRAVENOUS at 17:26

## 2019-01-01 RX ADMIN — ACETYLCYSTEINE 600 MG: 200 SOLUTION ORAL; RESPIRATORY (INHALATION) at 19:55

## 2019-01-01 RX ADMIN — METOLAZONE 2.5 MG: 2.5 TABLET ORAL at 09:00

## 2019-01-01 RX ADMIN — METOLAZONE 2.5 MG: 2.5 TABLET ORAL at 08:14

## 2019-01-01 RX ADMIN — POTASSIUM CHLORIDE 40 MEQ: 1.5 POWDER, FOR SOLUTION ORAL at 08:59

## 2019-01-01 RX ADMIN — LOSARTAN POTASSIUM 100 MG: 100 TABLET ORAL at 08:26

## 2019-01-01 RX ADMIN — OMEGA-3-ACID ETHYL ESTERS 2 G: 1 CAPSULE, LIQUID FILLED ORAL at 09:56

## 2019-01-01 RX ADMIN — ACETYLCYSTEINE 600 MG: 200 SOLUTION ORAL; RESPIRATORY (INHALATION) at 07:57

## 2019-01-01 RX ADMIN — IPRATROPIUM BROMIDE AND ALBUTEROL SULFATE 3 ML: .5; 3 SOLUTION RESPIRATORY (INHALATION) at 14:36

## 2019-01-01 RX ADMIN — SODIUM CHLORIDE 50 ML: 900 INJECTION INTRAVENOUS at 03:01

## 2019-01-01 RX ADMIN — SODIUM CHLORIDE, PRESERVATIVE FREE 10 ML: 5 INJECTION INTRAVENOUS at 09:45

## 2019-01-01 RX ADMIN — IPRATROPIUM BROMIDE AND ALBUTEROL SULFATE 3 ML: .5; 3 SOLUTION RESPIRATORY (INHALATION) at 14:25

## 2019-01-01 RX ADMIN — CILOSTAZOL 50 MG: 100 TABLET ORAL at 16:49

## 2019-01-01 RX ADMIN — ROCURONIUM BROMIDE 50 MG: 10 INJECTION INTRAVENOUS at 11:32

## 2019-01-01 RX ADMIN — VERAPAMIL HYDROCHLORIDE 240 MG: 240 TABLET, FILM COATED, EXTENDED RELEASE ORAL at 08:42

## 2019-01-01 RX ADMIN — ACETYLCYSTEINE 600 MG: 200 SOLUTION ORAL; RESPIRATORY (INHALATION) at 08:23

## 2019-01-01 RX ADMIN — METOPROLOL SUCCINATE 25 MG: 25 TABLET, EXTENDED RELEASE ORAL at 08:58

## 2019-01-01 RX ADMIN — METOPROLOL SUCCINATE 50 MG: 50 TABLET, EXTENDED RELEASE ORAL at 22:14

## 2019-01-01 RX ADMIN — CILOSTAZOL 50 MG: 100 TABLET ORAL at 18:41

## 2019-01-01 RX ADMIN — IPRATROPIUM BROMIDE AND ALBUTEROL SULFATE 3 ML: .5; 3 SOLUTION RESPIRATORY (INHALATION) at 16:12

## 2019-01-01 RX ADMIN — FUROSEMIDE 60 MG: 10 INJECTION, SOLUTION INTRAMUSCULAR; INTRAVENOUS at 08:40

## 2019-01-01 RX ADMIN — PHENYLEPHRINE HYDROCHLORIDE 100 MCG: 10 INJECTION INTRAVENOUS at 12:17

## 2019-01-01 RX ADMIN — CILOSTAZOL 50 MG: 100 TABLET ORAL at 20:52

## 2019-01-01 RX ADMIN — FUROSEMIDE 20 MG: 10 INJECTION, SOLUTION INTRAMUSCULAR; INTRAVENOUS at 08:59

## 2019-01-01 RX ADMIN — POTASSIUM CHLORIDE 20 MEQ: 1.5 POWDER, FOR SOLUTION ORAL at 17:33

## 2019-01-01 RX ADMIN — CILOSTAZOL 50 MG: 100 TABLET ORAL at 18:01

## 2019-01-01 RX ADMIN — ALBUTEROL SULFATE 2.5 MG: 2.5 SOLUTION RESPIRATORY (INHALATION) at 20:27

## 2019-01-01 RX ADMIN — ATORVASTATIN CALCIUM 10 MG: 10 TABLET, FILM COATED ORAL at 19:47

## 2019-01-01 RX ADMIN — METOLAZONE 5 MG: 2.5 TABLET ORAL at 09:55

## 2019-01-01 RX ADMIN — CILOSTAZOL 50 MG: 100 TABLET ORAL at 06:46

## 2019-01-01 RX ADMIN — SODIUM CHLORIDE, PRESERVATIVE FREE 10 ML: 5 INJECTION INTRAVENOUS at 18:15

## 2019-01-01 RX ADMIN — ASPIRIN 81 MG 81 MG: 81 TABLET ORAL at 09:13

## 2019-01-01 RX ADMIN — METOPROLOL SUCCINATE 25 MG: 25 TABLET, EXTENDED RELEASE ORAL at 19:58

## 2019-01-01 RX ADMIN — METOPROLOL SUCCINATE 25 MG: 25 TABLET, EXTENDED RELEASE ORAL at 08:25

## 2019-01-01 RX ADMIN — SODIUM CHLORIDE, PRESERVATIVE FREE 10 ML: 5 INJECTION INTRAVENOUS at 21:30

## 2019-01-01 RX ADMIN — AZITHROMYCIN DIHYDRATE 500 MG: 500 INJECTION, POWDER, LYOPHILIZED, FOR SOLUTION INTRAVENOUS at 19:57

## 2019-01-01 RX ADMIN — FUROSEMIDE 20 MG: 10 INJECTION, SOLUTION INTRAMUSCULAR; INTRAVENOUS at 08:39

## 2019-01-01 RX ADMIN — ACETYLCYSTEINE 600 MG: 200 SOLUTION ORAL; RESPIRATORY (INHALATION) at 11:28

## 2019-01-01 RX ADMIN — HEPARIN SODIUM 5000 UNITS: 5000 INJECTION INTRAVENOUS; SUBCUTANEOUS at 22:59

## 2019-01-01 RX ADMIN — METHYLPREDNISOLONE SODIUM SUCCINATE 40 MG: 40 INJECTION, POWDER, FOR SOLUTION INTRAMUSCULAR; INTRAVENOUS at 09:32

## 2019-01-01 RX ADMIN — ACETYLCYSTEINE 600 MG: 200 SOLUTION ORAL; RESPIRATORY (INHALATION) at 16:09

## 2019-01-01 RX ADMIN — FUROSEMIDE 40 MG: 10 INJECTION, SOLUTION INTRAMUSCULAR; INTRAVENOUS at 08:37

## 2019-01-01 RX ADMIN — OMEGA-3-ACID ETHYL ESTERS 2 G: 1 CAPSULE, LIQUID FILLED ORAL at 22:14

## 2019-01-01 RX ADMIN — HEPARIN SODIUM 5000 UNITS: 5000 INJECTION INTRAVENOUS; SUBCUTANEOUS at 14:10

## 2019-01-01 RX ADMIN — PHENYLEPHRINE HYDROCHLORIDE 50 MCG: 10 INJECTION INTRAVENOUS at 13:17

## 2019-01-01 RX ADMIN — FUROSEMIDE 40 MG: 40 TABLET ORAL at 09:12

## 2019-01-01 RX ADMIN — METOLAZONE 2.5 MG: 2.5 TABLET ORAL at 14:58

## 2019-01-01 RX ADMIN — CILOSTAZOL 50 MG: 100 TABLET ORAL at 09:56

## 2019-01-01 RX ADMIN — DOXYCYCLINE HYCLATE 100 MG: 100 TABLET, COATED ORAL at 10:02

## 2019-01-01 RX ADMIN — CEFAZOLIN SODIUM 2 G: 2 INJECTION, SOLUTION INTRAVENOUS at 11:30

## 2019-01-01 RX ADMIN — POTASSIUM CHLORIDE 20 MEQ: 1.5 POWDER, FOR SOLUTION ORAL at 09:27

## 2019-01-01 RX ADMIN — IPRATROPIUM BROMIDE AND ALBUTEROL SULFATE 3 ML: .5; 3 SOLUTION RESPIRATORY (INHALATION) at 08:43

## 2019-01-01 RX ADMIN — FUROSEMIDE 40 MG: 40 TABLET ORAL at 16:25

## 2019-01-01 RX ADMIN — SODIUM CHLORIDE, PRESERVATIVE FREE 10 ML: 5 INJECTION INTRAVENOUS at 08:57

## 2019-01-01 RX ADMIN — LOSARTAN POTASSIUM 100 MG: 100 TABLET ORAL at 09:45

## 2019-01-01 RX ADMIN — HEPARIN SODIUM 5000 UNITS: 5000 INJECTION INTRAVENOUS; SUBCUTANEOUS at 13:16

## 2019-01-01 RX ADMIN — ASPIRIN 81 MG 81 MG: 81 TABLET ORAL at 08:25

## 2019-01-01 RX ADMIN — ALBUTEROL SULFATE 2 PUFF: 90 AEROSOL, METERED RESPIRATORY (INHALATION) at 14:36

## 2019-01-01 RX ADMIN — DEXAMETHASONE SODIUM PHOSPHATE 8 MG: 4 INJECTION, SOLUTION INTRAMUSCULAR; INTRAVENOUS at 11:32

## 2019-01-01 RX ADMIN — OMEGA-3-ACID ETHYL ESTERS 2 G: 1 CAPSULE, LIQUID FILLED ORAL at 21:13

## 2019-01-01 RX ADMIN — ATORVASTATIN CALCIUM 10 MG: 10 TABLET, FILM COATED ORAL at 08:33

## 2019-01-01 RX ADMIN — ATORVASTATIN CALCIUM 10 MG: 10 TABLET, FILM COATED ORAL at 19:58

## 2019-01-01 RX ADMIN — OMEGA-3-ACID ETHYL ESTERS 2 G: 1 CAPSULE, LIQUID FILLED ORAL at 09:14

## 2019-01-01 RX ADMIN — CILOSTAZOL 50 MG: 100 TABLET ORAL at 09:41

## 2019-01-01 RX ADMIN — MICONAZOLE NITRATE: 20 CREAM TOPICAL at 20:00

## 2019-01-01 RX ADMIN — VERAPAMIL HYDROCHLORIDE 240 MG: 240 TABLET, FILM COATED, EXTENDED RELEASE ORAL at 08:26

## 2019-01-01 RX ADMIN — POTASSIUM CHLORIDE 20 MEQ: 1.5 POWDER, FOR SOLUTION ORAL at 09:40

## 2019-01-01 RX ADMIN — METOPROLOL SUCCINATE 50 MG: 50 TABLET, EXTENDED RELEASE ORAL at 21:00

## 2019-01-01 RX ADMIN — LEVOFLOXACIN 500 MG: 5 INJECTION, SOLUTION INTRAVENOUS at 12:18

## 2019-01-01 RX ADMIN — ATORVASTATIN CALCIUM 10 MG: 10 TABLET, FILM COATED ORAL at 19:57

## 2019-01-01 RX ADMIN — IPRATROPIUM BROMIDE AND ALBUTEROL SULFATE 3 ML: .5; 3 SOLUTION RESPIRATORY (INHALATION) at 11:56

## 2019-01-01 RX ADMIN — ACETYLCYSTEINE 600 MG: 200 SOLUTION ORAL; RESPIRATORY (INHALATION) at 07:40

## 2019-01-01 RX ADMIN — SODIUM CHLORIDE, PRESERVATIVE FREE 10 ML: 5 INJECTION INTRAVENOUS at 22:15

## 2019-01-01 RX ADMIN — CILOSTAZOL 50 MG: 100 TABLET ORAL at 05:52

## 2019-01-01 RX ADMIN — FUROSEMIDE 40 MG: 10 INJECTION, SOLUTION INTRAVENOUS at 09:35

## 2019-01-01 RX ADMIN — IPRATROPIUM BROMIDE AND ALBUTEROL SULFATE 3 ML: .5; 3 SOLUTION RESPIRATORY (INHALATION) at 11:50

## 2019-01-01 RX ADMIN — METOPROLOL SUCCINATE 25 MG: 25 TABLET, EXTENDED RELEASE ORAL at 20:56

## 2019-01-01 RX ADMIN — POTASSIUM CHLORIDE 40 MEQ: 1.5 POWDER, FOR SOLUTION ORAL at 08:05

## 2019-01-01 RX ADMIN — POTASSIUM CHLORIDE 40 MEQ: 1.5 POWDER, FOR SOLUTION ORAL at 08:38

## 2019-01-01 RX ADMIN — SODIUM CHLORIDE, PRESERVATIVE FREE 10 ML: 5 INJECTION INTRAVENOUS at 21:47

## 2019-01-01 RX ADMIN — GUAIFENESIN AND DEXTROMETHORPHAN 5 ML: 100; 10 SYRUP ORAL at 16:21

## 2019-01-01 RX ADMIN — LOSARTAN POTASSIUM 100 MG: 100 TABLET ORAL at 11:14

## 2019-01-01 RX ADMIN — METOPROLOL SUCCINATE 25 MG: 25 TABLET, EXTENDED RELEASE ORAL at 08:48

## 2019-01-01 RX ADMIN — VERAPAMIL HYDROCHLORIDE 240 MG: 240 TABLET, FILM COATED, EXTENDED RELEASE ORAL at 08:59

## 2019-01-01 RX ADMIN — IPRATROPIUM BROMIDE AND ALBUTEROL SULFATE 3 ML: .5; 3 SOLUTION RESPIRATORY (INHALATION) at 07:52

## 2019-01-01 RX ADMIN — DOXYCYCLINE HYCLATE 100 MG: 100 TABLET, COATED ORAL at 20:42

## 2019-01-01 RX ADMIN — CILOSTAZOL 50 MG: 100 TABLET ORAL at 09:28

## 2019-01-01 RX ADMIN — HYDROCODONE BITARTRATE AND ACETAMINOPHEN 2 TABLET: 5; 325 TABLET ORAL at 01:14

## 2019-01-01 RX ADMIN — ATORVASTATIN CALCIUM 10 MG: 10 TABLET, FILM COATED ORAL at 08:39

## 2019-01-01 RX ADMIN — DIPHENHYDRAMINE HYDROCHLORIDE 25 MG: 25 TABLET ORAL at 23:46

## 2019-01-01 RX ADMIN — ATORVASTATIN CALCIUM 10 MG: 10 TABLET, FILM COATED ORAL at 20:23

## 2019-01-01 RX ADMIN — FUROSEMIDE 20 MG: 10 INJECTION, SOLUTION INTRAMUSCULAR; INTRAVENOUS at 08:25

## 2019-01-01 RX ADMIN — ALBUTEROL SULFATE 2.5 MG: 2.5 SOLUTION RESPIRATORY (INHALATION) at 19:54

## 2019-01-01 RX ADMIN — CILOSTAZOL 50 MG: 100 TABLET ORAL at 20:55

## 2019-01-01 RX ADMIN — ASPIRIN 81 MG 81 MG: 81 TABLET ORAL at 11:13

## 2019-01-01 RX ADMIN — OMEGA-3-ACID ETHYL ESTERS 2 G: 1 CAPSULE, LIQUID FILLED ORAL at 09:34

## 2019-01-01 RX ADMIN — DOXYCYCLINE HYCLATE 100 MG: 100 TABLET, COATED ORAL at 09:41

## 2019-01-01 RX ADMIN — CILOSTAZOL 50 MG: 100 TABLET ORAL at 17:14

## 2019-01-01 RX ADMIN — SODIUM CHLORIDE, PRESERVATIVE FREE 10 ML: 5 INJECTION INTRAVENOUS at 20:56

## 2019-01-01 RX ADMIN — MICONAZOLE NITRATE: 20 POWDER TOPICAL at 09:02

## 2019-01-01 RX ADMIN — IPRATROPIUM BROMIDE AND ALBUTEROL SULFATE 3 ML: .5; 3 SOLUTION RESPIRATORY (INHALATION) at 07:30

## 2019-01-01 RX ADMIN — IPRATROPIUM BROMIDE AND ALBUTEROL SULFATE 3 ML: .5; 3 SOLUTION RESPIRATORY (INHALATION) at 07:55

## 2019-01-01 RX ADMIN — METOPROLOL SUCCINATE 25 MG: 25 TABLET, EXTENDED RELEASE ORAL at 08:29

## 2019-01-01 RX ADMIN — OMEGA-3-ACID ETHYL ESTERS 2 G: 1 CAPSULE, LIQUID FILLED ORAL at 20:56

## 2019-01-01 RX ADMIN — ACETAMINOPHEN 650 MG: 325 TABLET ORAL at 21:24

## 2019-01-01 RX ADMIN — BACLOFEN 10 MG: 10 TABLET ORAL at 22:55

## 2019-01-01 RX ADMIN — ALBUTEROL SULFATE 1.25 MG: 2.5 SOLUTION RESPIRATORY (INHALATION) at 22:26

## 2019-01-01 RX ADMIN — FUROSEMIDE 40 MG: 40 TABLET ORAL at 08:35

## 2019-01-01 RX ADMIN — ASPIRIN 81 MG 81 MG: 81 TABLET ORAL at 08:26

## 2019-01-01 RX ADMIN — POTASSIUM CHLORIDE 40 MEQ: 1.5 POWDER, FOR SOLUTION ORAL at 20:57

## 2019-01-01 RX ADMIN — PHENYLEPHRINE HYDROCHLORIDE 100 MCG: 10 INJECTION INTRAVENOUS at 13:44

## 2019-01-01 RX ADMIN — IPRATROPIUM BROMIDE AND ALBUTEROL SULFATE 3 ML: .5; 3 SOLUTION RESPIRATORY (INHALATION) at 20:16

## 2019-01-01 RX ADMIN — ASPIRIN 81 MG: 81 TABLET, COATED ORAL at 17:33

## 2019-01-01 RX ADMIN — CILOSTAZOL 50 MG: 100 TABLET ORAL at 08:29

## 2019-01-01 RX ADMIN — METOPROLOL SUCCINATE 50 MG: 50 TABLET, EXTENDED RELEASE ORAL at 20:51

## 2019-01-01 RX ADMIN — PROTAMINE SULFATE 25 MG: 10 INJECTION, SOLUTION INTRAVENOUS at 14:08

## 2019-01-01 RX ADMIN — DIPHENHYDRAMINE HYDROCHLORIDE 25 MG: 25 CAPSULE ORAL at 22:17

## 2019-01-01 RX ADMIN — OMEGA-3-ACID ETHYL ESTERS 2 G: 1 CAPSULE, LIQUID FILLED ORAL at 20:20

## 2019-01-01 RX ADMIN — CEFTRIAXONE SODIUM 1 G: 1 INJECTION, POWDER, FOR SOLUTION INTRAMUSCULAR; INTRAVENOUS at 18:02

## 2019-01-01 RX ADMIN — FUROSEMIDE 20 MG: 10 INJECTION, SOLUTION INTRAMUSCULAR; INTRAVENOUS at 09:13

## 2019-01-01 RX ADMIN — LOSARTAN POTASSIUM 100 MG: 100 TABLET, FILM COATED ORAL at 10:08

## 2019-01-01 RX ADMIN — SODIUM CHLORIDE, PRESERVATIVE FREE 10 ML: 5 INJECTION INTRAVENOUS at 19:59

## 2019-01-01 RX ADMIN — SODIUM CHLORIDE, PRESERVATIVE FREE 10 ML: 5 INJECTION INTRAVENOUS at 07:22

## 2019-01-01 RX ADMIN — SODIUM CHLORIDE, PRESERVATIVE FREE 10 ML: 5 INJECTION INTRAVENOUS at 10:11

## 2019-01-01 RX ADMIN — CEFEPIME 2 G: 2 INJECTION, POWDER, FOR SOLUTION INTRAVENOUS at 14:38

## 2019-01-01 RX ADMIN — IPRATROPIUM BROMIDE AND ALBUTEROL SULFATE 3 ML: .5; 3 SOLUTION RESPIRATORY (INHALATION) at 07:06

## 2019-01-01 RX ADMIN — ATORVASTATIN CALCIUM 10 MG: 10 TABLET, FILM COATED ORAL at 08:40

## 2019-01-01 RX ADMIN — METOPROLOL SUCCINATE 25 MG: 25 TABLET, EXTENDED RELEASE ORAL at 21:54

## 2019-01-01 RX ADMIN — SODIUM CHLORIDE, PRESERVATIVE FREE 10 ML: 5 INJECTION INTRAVENOUS at 21:00

## 2019-01-01 RX ADMIN — ALBUTEROL SULFATE 1.25 MG: 2.5 SOLUTION RESPIRATORY (INHALATION) at 02:09

## 2019-01-01 RX ADMIN — PHENYLEPHRINE HYDROCHLORIDE 100 MCG: 10 INJECTION INTRAVENOUS at 14:01

## 2019-01-01 RX ADMIN — DIPHENHYDRAMINE HCL 50 MG: 25 TABLET ORAL at 07:29

## 2019-01-01 RX ADMIN — OMEGA-3-ACID ETHYL ESTERS 2 G: 1 CAPSULE, LIQUID FILLED ORAL at 10:01

## 2019-01-01 RX ADMIN — ACETYLCYSTEINE 600 MG: 200 SOLUTION ORAL; RESPIRATORY (INHALATION) at 19:18

## 2019-01-01 RX ADMIN — METOPROLOL SUCCINATE 25 MG: 25 TABLET, EXTENDED RELEASE ORAL at 08:40

## 2019-01-01 RX ADMIN — ASPIRIN 81 MG: 81 TABLET, COATED ORAL at 08:37

## 2019-01-01 RX ADMIN — AMOXICILLIN AND CLAVULANATE POTASSIUM 1 TABLET: 875; 125 TABLET, FILM COATED ORAL at 21:11

## 2019-01-01 RX ADMIN — ACETYLCYSTEINE 600 MG: 200 SOLUTION ORAL; RESPIRATORY (INHALATION) at 08:51

## 2019-01-01 RX ADMIN — SODIUM CHLORIDE, PRESERVATIVE FREE 10 ML: 5 INJECTION INTRAVENOUS at 17:23

## 2019-01-01 RX ADMIN — SODIUM CHLORIDE, POTASSIUM CHLORIDE, SODIUM LACTATE AND CALCIUM CHLORIDE: 600; 310; 30; 20 INJECTION, SOLUTION INTRAVENOUS at 13:22

## 2019-01-01 RX ADMIN — SODIUM CHLORIDE, PRESERVATIVE FREE 10 ML: 5 INJECTION INTRAVENOUS at 21:26

## 2019-01-01 RX ADMIN — ALBUTEROL SULFATE 1.25 MG: 2.5 SOLUTION RESPIRATORY (INHALATION) at 15:38

## 2019-01-01 RX ADMIN — AZITHROMYCIN DIHYDRATE 500 MG: 500 INJECTION, POWDER, LYOPHILIZED, FOR SOLUTION INTRAVENOUS at 20:12

## 2019-01-01 RX ADMIN — ASPIRIN 81 MG 81 MG: 81 TABLET ORAL at 08:33

## 2019-01-01 RX ADMIN — OMEGA-3-ACID ETHYL ESTERS 2 G: 1 CAPSULE, LIQUID FILLED ORAL at 08:39

## 2019-01-01 RX ADMIN — IPRATROPIUM BROMIDE AND ALBUTEROL SULFATE 3 ML: .5; 3 SOLUTION RESPIRATORY (INHALATION) at 07:53

## 2019-01-01 RX ADMIN — SODIUM CHLORIDE, PRESERVATIVE FREE 10 ML: 5 INJECTION INTRAVENOUS at 20:47

## 2019-01-01 RX ADMIN — METOPROLOL SUCCINATE 25 MG: 25 TABLET, EXTENDED RELEASE ORAL at 09:34

## 2019-01-01 RX ADMIN — METOPROLOL SUCCINATE 25 MG: 25 TABLET, EXTENDED RELEASE ORAL at 08:39

## 2019-01-01 RX ADMIN — METOPROLOL SUCCINATE 25 MG: 25 TABLET, EXTENDED RELEASE ORAL at 19:37

## 2019-01-01 RX ADMIN — SODIUM CHLORIDE, PRESERVATIVE FREE 10 ML: 5 INJECTION INTRAVENOUS at 20:41

## 2019-01-01 RX ADMIN — FUROSEMIDE 40 MG: 40 TABLET ORAL at 18:00

## 2019-01-01 RX ADMIN — SODIUM CHLORIDE, PRESERVATIVE FREE 10 ML: 5 INJECTION INTRAVENOUS at 21:20

## 2019-01-01 RX ADMIN — VERAPAMIL HYDROCHLORIDE 240 MG: 240 TABLET, FILM COATED, EXTENDED RELEASE ORAL at 09:00

## 2019-01-01 RX ADMIN — POTASSIUM CHLORIDE 20 MEQ: 1.5 POWDER, FOR SOLUTION ORAL at 10:01

## 2019-01-01 RX ADMIN — SODIUM CHLORIDE: 4.5 INJECTION, SOLUTION INTRAVENOUS at 15:24

## 2019-01-01 RX ADMIN — SODIUM CHLORIDE, PRESERVATIVE FREE 10 ML: 5 INJECTION INTRAVENOUS at 08:33

## 2019-01-01 RX ADMIN — GUAIFENESIN AND DEXTROMETHORPHAN 5 ML: 100; 10 SYRUP ORAL at 09:47

## 2019-01-01 RX ADMIN — VERAPAMIL HYDROCHLORIDE 240 MG: 240 TABLET, FILM COATED, EXTENDED RELEASE ORAL at 08:31

## 2019-01-01 RX ADMIN — AZITHROMYCIN MONOHYDRATE 500 MG: 500 INJECTION, POWDER, LYOPHILIZED, FOR SOLUTION INTRAVENOUS at 21:33

## 2019-01-01 RX ADMIN — MICONAZOLE NITRATE: 20 POWDER TOPICAL at 11:30

## 2019-01-01 RX ADMIN — ACETAMINOPHEN 650 MG: 325 TABLET ORAL at 20:41

## 2019-01-01 RX ADMIN — FUROSEMIDE 40 MG: 40 TABLET ORAL at 09:01

## 2019-01-01 RX ADMIN — METOPROLOL SUCCINATE 25 MG: 25 TABLET, EXTENDED RELEASE ORAL at 23:47

## 2019-01-01 RX ADMIN — AMOXICILLIN AND CLAVULANATE POTASSIUM 1 TABLET: 875; 125 TABLET, FILM COATED ORAL at 09:28

## 2019-01-01 RX ADMIN — ASPIRIN 81 MG 81 MG: 81 TABLET ORAL at 08:39

## 2019-01-01 RX ADMIN — POTASSIUM CHLORIDE 40 MEQ: 20 TABLET, EXTENDED RELEASE ORAL at 12:40

## 2019-01-01 RX ADMIN — SODIUM CHLORIDE, PRESERVATIVE FREE 10 ML: 5 INJECTION INTRAVENOUS at 20:20

## 2019-01-01 RX ADMIN — CEFTRIAXONE SODIUM 1 G: 1 INJECTION, POWDER, FOR SOLUTION INTRAMUSCULAR; INTRAVENOUS at 12:01

## 2019-01-01 RX ADMIN — VERAPAMIL HYDROCHLORIDE 240 MG: 240 TABLET, FILM COATED, EXTENDED RELEASE ORAL at 08:48

## 2019-01-01 ASSESSMENT — PULMONARY FUNCTION TESTS
PIF_VALUE: 31
PIF_VALUE: 31
PIF_VALUE: 32
PIF_VALUE: 30
PIF_VALUE: 31
PIF_VALUE: 29
PIF_VALUE: 29
PIF_VALUE: 31
PIF_VALUE: 31
PIF_VALUE: 30
PIF_VALUE: 31
PIF_VALUE: 8
PIF_VALUE: 1
PIF_VALUE: 12
PIF_VALUE: 32
PIF_VALUE: 19
PIF_VALUE: 31
PIF_VALUE: 30
PIF_VALUE: 33
PIF_VALUE: 32
PIF_VALUE: 31
PIF_VALUE: 33
PIF_VALUE: 32
PIF_VALUE: 31
PIF_VALUE: 30
PIF_VALUE: 31
PIF_VALUE: 32
PIF_VALUE: 33
PIF_VALUE: 32
PIF_VALUE: 32
PIF_VALUE: 31
PIF_VALUE: 31
PIF_VALUE: 30
PIF_VALUE: 31
PIF_VALUE: 32
PIF_VALUE: 31
PIF_VALUE: 32
PIF_VALUE: 32
PIF_VALUE: 30
PIF_VALUE: 25
PIF_VALUE: 31
PIF_VALUE: 25
PIF_VALUE: 30
PIF_VALUE: 31
PIF_VALUE: 31
PIF_VALUE: 13
PIF_VALUE: 30
PIF_VALUE: 28
PIF_VALUE: 25
PIF_VALUE: 0
PIF_VALUE: 27
PIF_VALUE: 30
PIF_VALUE: 27
PIF_VALUE: 31
PIF_VALUE: 28
PIF_VALUE: 30
PIF_VALUE: 31
PIF_VALUE: 29
PIF_VALUE: 31
PIF_VALUE: 25
PIF_VALUE: 31
PIF_VALUE: 32
PIF_VALUE: 32
PIF_VALUE: 31
PIF_VALUE: 30
PIF_VALUE: 30
PIF_VALUE: 32
PIF_VALUE: 30
PIF_VALUE: 31
PIF_VALUE: 29
PIF_VALUE: 25
PIF_VALUE: 32
PIF_VALUE: 31
PIF_VALUE: 30
PIF_VALUE: 30
PIF_VALUE: 32
PIF_VALUE: 32
PIF_VALUE: 29
PIF_VALUE: 27
PIF_VALUE: 31
PIF_VALUE: 31
PIF_VALUE: 32
PIF_VALUE: 31
PEFR_L/MIN: 16
PIF_VALUE: 0
PIF_VALUE: 32
PIF_VALUE: 29
PIF_VALUE: 32
PIF_VALUE: 31
PIF_VALUE: 30
PIF_VALUE: 31
PIF_VALUE: 11
PIF_VALUE: 31
PIF_VALUE: 32
PIF_VALUE: 31
PIF_VALUE: 12
PIF_VALUE: 31
PIF_VALUE: 31
PIF_VALUE: 32
PIF_VALUE: 32
PIF_VALUE: 34
PIF_VALUE: 32
PIF_VALUE: 29
PIF_VALUE: 30
PIF_VALUE: 30
PIF_VALUE: 26
PIF_VALUE: 31
PIF_VALUE: 25
PIF_VALUE: 4
PIF_VALUE: 0
PIF_VALUE: 31
PIF_VALUE: 27
PIF_VALUE: 32
PIF_VALUE: 31
PIF_VALUE: 33
PIF_VALUE: 32
PIF_VALUE: 31
PIF_VALUE: 20
PIF_VALUE: 30
PIF_VALUE: 30
PIF_VALUE: 0
PIF_VALUE: 5
PIF_VALUE: 30
PIF_VALUE: 31
PIF_VALUE: 32
PIF_VALUE: 25
PIF_VALUE: 31
PIF_VALUE: 28
PIF_VALUE: 31
PIF_VALUE: 21
PIF_VALUE: 31
PIF_VALUE: 31
PIF_VALUE: 32
PIF_VALUE: 29
PIF_VALUE: 30
PIF_VALUE: 31
PIF_VALUE: 31
PIF_VALUE: 33
PIF_VALUE: 31
PIF_VALUE: 25
PIF_VALUE: 33
PIF_VALUE: 30
PIF_VALUE: 32
PIF_VALUE: 31
PIF_VALUE: 32
PIF_VALUE: 25
PIF_VALUE: 25
PIF_VALUE: 30
PIF_VALUE: 15
PIF_VALUE: 31
PIF_VALUE: 25
PIF_VALUE: 31
PIF_VALUE: 32
PIF_VALUE: 30
PIF_VALUE: 31
PIF_VALUE: 1
PIF_VALUE: 6
PIF_VALUE: 31
PIF_VALUE: 31
PIF_VALUE: 32
PIF_VALUE: 32
PIF_VALUE: 2
PIF_VALUE: 31
PIF_VALUE: 7
PIF_VALUE: 32
PIF_VALUE: 30
PIF_VALUE: 31
PIF_VALUE: 29
PIF_VALUE: 56
PIF_VALUE: 31
PIF_VALUE: 32

## 2019-01-01 ASSESSMENT — PAIN SCALES - GENERAL
PAINLEVEL_OUTOF10: 0
PAINLEVEL_OUTOF10: 3
PAINLEVEL_OUTOF10: 0
PAINLEVEL_OUTOF10: 3
PAINLEVEL_OUTOF10: 0
PAINLEVEL_OUTOF10: 4
PAINLEVEL_OUTOF10: 0
PAINLEVEL_OUTOF10: 5
PAINLEVEL_OUTOF10: 3
PAINLEVEL_OUTOF10: 0
PAINLEVEL_OUTOF10: 4
PAINLEVEL_OUTOF10: 0
PAINLEVEL_OUTOF10: 3
PAINLEVEL_OUTOF10: 0
PAINLEVEL_OUTOF10: 5
PAINLEVEL_OUTOF10: 0
PAINLEVEL_OUTOF10: 6
PAINLEVEL_OUTOF10: 0
PAINLEVEL_OUTOF10: 1
PAINLEVEL_OUTOF10: 0
PAINLEVEL_OUTOF10: 3
PAINLEVEL_OUTOF10: 6
PAINLEVEL_OUTOF10: 3
PAINLEVEL_OUTOF10: 0
PAINLEVEL_OUTOF10: 2
PAINLEVEL_OUTOF10: 0
PAINLEVEL_OUTOF10: 1
PAINLEVEL_OUTOF10: 0
PAINLEVEL_OUTOF10: 9
PAINLEVEL_OUTOF10: 0
PAINLEVEL_OUTOF10: 3
PAINLEVEL_OUTOF10: 0
PAINLEVEL_OUTOF10: 3
PAINLEVEL_OUTOF10: 0
PAINLEVEL_OUTOF10: 3
PAINLEVEL_OUTOF10: 0

## 2019-01-01 ASSESSMENT — ENCOUNTER SYMPTOMS
COLOR CHANGE: 0
SHORTNESS OF BREATH: 1
SINUS PAIN: 0
SHORTNESS OF BREATH: 1
EYES NEGATIVE: 1
SHORTNESS OF BREATH: 1
EYE DISCHARGE: 0
BLOOD IN STOOL: 0
GASTROINTESTINAL NEGATIVE: 1
BACK PAIN: 0
GASTROINTESTINAL NEGATIVE: 1
GASTROINTESTINAL NEGATIVE: 1
ABDOMINAL PAIN: 0
SHORTNESS OF BREATH: 1
GASTROINTESTINAL NEGATIVE: 1
EYE REDNESS: 0
SORE THROAT: 0
NAUSEA: 0
SHORTNESS OF BREATH: 1
WHEEZING: 1
GASTROINTESTINAL NEGATIVE: 1
EYES NEGATIVE: 1
ALLERGIC/IMMUNOLOGIC NEGATIVE: 1
ALLERGIC/IMMUNOLOGIC NEGATIVE: 1
SHORTNESS OF BREATH: 1
RHINORRHEA: 0
ABDOMINAL DISTENTION: 0
SINUS PAIN: 0
VOMITING: 0
ALLERGIC/IMMUNOLOGIC NEGATIVE: 1
EYES NEGATIVE: 1
EYE ITCHING: 0
SHORTNESS OF BREATH: 1
SHORTNESS OF BREATH: 1
EYES NEGATIVE: 1
BACK PAIN: 0
ABDOMINAL PAIN: 0
SINUS PRESSURE: 0
DIARRHEA: 0
COUGH: 1
ALLERGIC/IMMUNOLOGIC NEGATIVE: 1
WHEEZING: 1
CONSTIPATION: 0
CHEST TIGHTNESS: 0
EYE ITCHING: 0
SINUS PRESSURE: 0
COUGH: 1
COUGH: 1
ABDOMINAL DISTENTION: 0
COUGH: 1
EYES NEGATIVE: 1

## 2019-01-01 ASSESSMENT — PAIN DESCRIPTION - LOCATION
LOCATION: SHOULDER
LOCATION: SHOULDER
LOCATION: GENERALIZED;GROIN;LEG
LOCATION: ARM;LEG
LOCATION: KNEE
LOCATION: COCCYX
LOCATION: SHOULDER
LOCATION: BACK
LOCATION: BACK;GENERALIZED;GROIN

## 2019-01-01 ASSESSMENT — PAIN DESCRIPTION - ORIENTATION
ORIENTATION: RIGHT
ORIENTATION: MID
ORIENTATION: RIGHT
ORIENTATION: RIGHT
ORIENTATION: LEFT
ORIENTATION: RIGHT

## 2019-01-01 ASSESSMENT — PAIN DESCRIPTION - DESCRIPTORS
DESCRIPTORS: ACHING;DISCOMFORT
DESCRIPTORS: ACHING
DESCRIPTORS: ACHING;DULL
DESCRIPTORS: DISCOMFORT
DESCRIPTORS: ACHING
DESCRIPTORS: ACHING
DESCRIPTORS: ACHING;DULL

## 2019-01-01 ASSESSMENT — SLEEP AND FATIGUE QUESTIONNAIRES
HOW LIKELY ARE YOU TO NOD OFF OR FALL ASLEEP WHILE SITTING AND TALKING TO SOMEONE: 0
HOW LIKELY ARE YOU TO NOD OFF OR FALL ASLEEP WHEN YOU ARE A PASSENGER IN A CAR FOR AN HOUR WITHOUT A BREAK: 1
NECK CIRCUMFERENCE (INCHES): 20
HOW LIKELY ARE YOU TO NOD OFF OR FALL ASLEEP WHILE SITTING QUIETLY AFTER LUNCH WITHOUT ALCOHOL: 2
HOW LIKELY ARE YOU TO NOD OFF OR FALL ASLEEP WHILE SITTING INACTIVE IN A PUBLIC PLACE: 2
ESS TOTAL SCORE: 11
HOW LIKELY ARE YOU TO NOD OFF OR FALL ASLEEP WHILE LYING DOWN TO REST IN THE AFTERNOON WHEN CIRCUMSTANCES PERMIT: 2
HOW LIKELY ARE YOU TO NOD OFF OR FALL ASLEEP WHILE SITTING AND READING: 2
HOW LIKELY ARE YOU TO NOD OFF OR FALL ASLEEP WHILE WATCHING TV: 2
HOW LIKELY ARE YOU TO NOD OFF OR FALL ASLEEP IN A CAR, WHILE STOPPED FOR A FEW MINUTES IN TRAFFIC: 0

## 2019-01-01 ASSESSMENT — PAIN DESCRIPTION - PAIN TYPE
TYPE: CHRONIC PAIN;SURGICAL PAIN
TYPE: ACUTE PAIN
TYPE: ACUTE PAIN
TYPE: CHRONIC PAIN
TYPE: CHRONIC PAIN;SURGICAL PAIN
TYPE: ACUTE PAIN
TYPE: CHRONIC PAIN
TYPE: ACUTE PAIN

## 2019-01-01 ASSESSMENT — PAIN DESCRIPTION - ONSET: ONSET: PROGRESSIVE

## 2019-01-01 ASSESSMENT — PAIN DESCRIPTION - FREQUENCY
FREQUENCY: INTERMITTENT
FREQUENCY: CONTINUOUS
FREQUENCY: INTERMITTENT

## 2019-01-01 ASSESSMENT — PAIN DESCRIPTION - PROGRESSION
CLINICAL_PROGRESSION: GRADUALLY IMPROVING
CLINICAL_PROGRESSION: GRADUALLY WORSENING

## 2019-01-01 ASSESSMENT — PAIN - FUNCTIONAL ASSESSMENT
PAIN_FUNCTIONAL_ASSESSMENT: 0-10
PAIN_FUNCTIONAL_ASSESSMENT: ACTIVITIES ARE NOT PREVENTED

## 2019-01-01 ASSESSMENT — PAIN SCALES - WONG BAKER: WONGBAKER_NUMERICALRESPONSE: 0

## 2019-06-10 PROBLEM — I73.9 PVD (PERIPHERAL VASCULAR DISEASE) (HCC): Status: ACTIVE | Noted: 2019-01-01

## 2019-06-14 NOTE — ANESTHESIA PRE PROCEDURE
Department of Anesthesiology  Preprocedure Note       Name:  Wm Carney   Age:  80 y.o.  :  1934                                          MRN:  [id number]         Date:  2019      Surgeon: Alisia Escobedo):  Floyd Nayak MD    Procedure: FEMORAL ENDARTERECTOMY ANEURYSM REP ANGIOPLASTY RIGHT (Right )    Medications prior to admission:   Prior to Admission medications    Medication Sig Start Date End Date Taking? Authorizing Provider   aspirin 81 MG tablet Take 81 mg by mouth daily    Historical Provider, MD   atorvastatin (LIPITOR) 10 MG tablet Take 10 mg by mouth daily    Historical Provider, MD   losartan (COZAAR) 100 MG tablet Take 1 tablet by mouth daily 5/10/18   Virgilio Roach MD   metoprolol succinate (TOPROL XL) 50 MG extended release tablet Take 1 tablet by mouth 2 times daily 5/10/18   Virgilio Roach MD   verapamil (VERELAN) 240 MG extended release capsule Take 1 capsule by mouth daily 5/10/18   Virgilio Roach MD   Omega 3 1000 MG CAPS Take 2 capsules by mouth 2 times daily. Historical Provider, MD       Current medications:    Current Outpatient Medications   Medication Sig Dispense Refill    aspirin 81 MG tablet Take 81 mg by mouth daily      atorvastatin (LIPITOR) 10 MG tablet Take 10 mg by mouth daily      losartan (COZAAR) 100 MG tablet Take 1 tablet by mouth daily 90 tablet 1    metoprolol succinate (TOPROL XL) 50 MG extended release tablet Take 1 tablet by mouth 2 times daily 180 tablet 1    verapamil (VERELAN) 240 MG extended release capsule Take 1 capsule by mouth daily 90 capsule 1    Omega 3 1000 MG CAPS Take 2 capsules by mouth 2 times daily. No current facility-administered medications for this encounter.         Allergies:  No Known Allergies    Problem List:    Patient Active Problem List   Diagnosis Code    Essential hypertension I10    Mixed hyperlipidemia E78.2    Hyperglycemia R73.9    Left-sided carotid artery disease ([facility]): R  I77.9    Restrictive lung disease J98.4    Renal insufficiency N28.9    Right knee pain M25.561    Squamous cell carcinoma of nose C44.321    Abdominal aortic aneurysm (AAA) without rupture (HCC) I71.4    Arthritis of both knees M17.0    PVD (peripheral vascular disease) (HCC) I73.9       Past Medical History:        Diagnosis Date    Carotid artery disease (HCC)     complete occlusion left ICA, status post right carotid endarterectomy    Cellulitis of right lower extremity 2016    Was admitted in [address] [place] [address] in     Chronic cough 2015    Chest x-ray showed mild pulmonary congestion that is chronic. And some atelectasis PFT showed no obstructive disease. Has restrictive lung disease.  H/O colonoscopy 8/10    Dr. Joshua Merino H/O Doppler ultrasound 2009, ,    carotid doppler left % blocked, right patent    HTN (hypertension)     Hyperglycemia 2014    A1c 6.1    Hyperlipidemia     Left-sided carotid artery disease ([address] 2015    Complete occlusion of left. R carotid endarterectomy Carotid doppler . R  Is patent. Carotid doppler 6/15  R is patent    Squamous cell carcinoma of nose 2018    3/2018 : scc of the nose  Seen Dr Sheridan Uriostegui    Tobacco abuse disorder 8/15/2013    60 pack year history. Quit in     Ulcer of right foot with fat layer exposed ([address] 2016       Past Surgical History:        Procedure Laterality Date    CAROTID ENDARTERECTOMY  2004    right       Social History:    Social History     Tobacco Use    Smoking status: Former Smoker     Types: Cigars     Last attempt to quit: 2016     Years since quittin.9    Smokeless tobacco: Never Used   Substance Use Topics    Alcohol use: Yes     Alcohol/week: 0.0 oz     Comment: rare                                Counseling given: Not Answered      Vital Signs (Current): There were no vitals filed for this visit.                                            BP Readings from Last 3 Encounters: 06/13/19 (!) 148/84   06/10/19 (!) 178/95   05/30/19 128/74       NPO Status:                                                                                 BMI:   Wt Readings from Last 3 Encounters:   06/13/19 232 lb (105.2 kg)   06/10/19 235 lb (106.6 kg)   05/30/19 235 lb (106.6 kg)     There is no height or weight on file to calculate BMI. CBC  Lab Results   Component Value Date/Time    WBC 9.6 06/19/2019 01:55 PM    HGB 11.0 (L) 06/19/2019 01:55 PM    HCT 38.2 (L) 06/19/2019 01:55 PM     06/19/2019 01:55 PM     RENAL  Lab Results   Component Value Date/Time     06/19/2019 01:55 PM    K 4.5 06/19/2019 01:55 PM     06/19/2019 01:55 PM    CO2 28 06/19/2019 01:55 PM    BUN 20 06/19/2019 01:55 PM    CREATININE 1.3 06/19/2019 01:55 PM    GLUCOSE 150 (H) 06/19/2019 01:55 PM     COAGS  Lab Results   Component Value Date/Time    PROTIME 11.3 06/19/2019 01:55 PM    INR 0.99 06/19/2019 01:55 PM    APTT 32.4 06/19/2019 01:55 PM     Anesthesia Evaluation  Patient summary reviewed and Nursing notes reviewed no history of anesthetic complications:   Airway: Mallampati: IV  TM distance: <3 FB   Neck ROM: limited  Mouth opening: < 3 FB Dental:          Pulmonary:   (+) COPD (home oxygen 2L @ HS. Inhaler x 1):  shortness of breath (worked in [employer] for years. Dust exposure):  decreased breath sounds,                            ROS comment: Restrictive lung disease  Former smoker, hx 1ppd x 60 years, quit 2016      PAT Airway Exam:  Head/Neck movement: limited   Thyromental Distance: >3 FB  History of difficult intubation:  None  Mallampati Classification:  Class II  Teeth: missing upper and lower molars  Able to lie flat     LUNGS:  No increased work of breathing, good air exchange, clear to auscultation bilaterally, no crackles or wheezing    CXR: 6/19/2019  Bilateral perihilar airspace opacities predominantly on the frontal view most  likely due to atelectasis given low lung volumes.  Underlying pulmonary  vascular congestion may be present. Cardiovascular:  Exercise tolerance: poor (<4 METS),   (+) hypertension (on beta blocker and ASA):, hyperlipidemia               ROS comment:  CARDIOVASCULAR:  Normal apical impulse, regular rate and rhythm, normal S1 and S2, no S3 or S4, and no murmur noted    CP: NO   Exercise: NO     EK2019  NSR     Neuro/Psych:   (+) neuromuscular disease (weak legs, use walker or WC. Able to stand):,              ROS comment: [place] [facility]/Hepatic/Renal:   (+) renal disease (CKD, stage III. baseline CR: 1.1-1.7): CRI, morbid obesity          Endo/Other:    (+) blood dyscrasia (HGB: 11.0, HCT: 38.2 T&S per surgeon): anemia, arthritis (sherwin knees): OA., malignancy/cancer (skin CA on nose. excised left side of nose. ). Pt had PAT visit. Abdominal:   (+) obese,     Abdomen: soft. Vascular:   + PVD, aortic or cerebral (  infrarenal AAA 3.5 cm, Right SFA occlusion from profunda bifurcation into proximal SFA per peripheral cath 6/10/2019. On pletal.  Unsure if stopped. Dr. Nuris Escobar office notified. Sherwin rivera on LE.  ), . ROS comment: Occluded left carotid s/p right CEA, 2004    Hx right leg cellulitis, 2016. Anesthesia Plan      general     ASA 4     (Recd radiation to nasal area in 2018.)  Induction: intravenous. arterial line  MIPS: Postoperative opioids intended and Prophylactic antiemetics administered. Anesthetic plan and risks discussed with patient and child/children. Pre Anesthesia Evaluation complete. Anesthesia plan, risks, benefits, alternatives, and personnel discussed with patient and/or legal guardian. Patient and/or legal guardian verbalized an understanding and agreed to proceed. Anesthesia plan discussed with care team members and agreed upon.   FABIAN Prince - CRNA  2019

## 2019-06-19 NOTE — PROGRESS NOTES
Contacted Dr Willingham Shoulder office to let them know the patient is on Pletal and per ecf was not instructed to stop- office staff nurse states  She talked with dr Willingham Shoulder and if has not taken today- to stop and ok for surgery - daughter notified of this and noted on paperwork for the [facility]

## 2019-06-20 NOTE — PROGRESS NOTES
12- pt rec'd from the OR and placed on pacu monitor with alarms on. Report rec'd from David MCKEON and OR nurse. Pt arousing and following commands. Re-oriented to surroundings. resps even and unlabored. Right groin area soft upon palpation. J-P drain compressed and has bloody drainage present. Right pedal pulse palpable. Cellulitis/erythemic flaky skin noted elvin LE. Pt denies any pain. 1538- Expiratory wheezing audible all lung fields. Breathing treatment started. 1600- turned and repositioned. Tolerated well. 1632- pt voiced some relief in right shoulder. VSS. Right groin soft upon palpation. Pt transferred to room 2006 via bed and monitor. Receiving nurse at the bedside. Family updated.

## 2019-06-20 NOTE — PROGRESS NOTES
Patient arrives from PACU to room 2006. Patient alert and oriented. Patient has incisions x 2 to right groin, dressing dry and intact. Patient has STEPH drain, draining dark red.

## 2019-06-20 NOTE — OP NOTE
Date of Procedure:   6/20/19        Surgeon: Bri Solis MD/ Sharon Fofana MD    Assistant: Mat Soto PA-c    Preoperative Diagnosis: PAD [facility]peripheral artery disease) ([facility] [I73.9]     Postoperative Diagnosis: PAD (peripheral artery disease) ([facility] [I73.9]      Operation: Right Femoral Endarterectomy with Cormatrix patch      Anesthesia: General    Findings: Heavily calcific plaque, Densely scarred R CFA, Profunda and SFA    Procedure:  Patient was brought to the operating room after preoperative discussion with patient and family and review of the investigations. Preoperative arterial line and transcranial oxygen saturation monitoring was established. Time out per check list confirmed  Under general anesthesia abd and BLE was prepared and draped. Right oblique groin incision was made. Extensive scarring was noted over the CFA. The common femoral artery was identified superiorly and carefully mobilized distally. Proximal and distal control was obtained with vessel loops. The patient was systemically heparinized. After heparinization clamps were placed proximally and distally. Arteriotomy was made in the CFA and carried through the plaque. The plaque was  shortly from the CFA and removed smoothly. Intimal hyperplasia was noted at previous endovascular entry site. All the loose plaques were removed. The arteriotomy was closed using a CorMatrix patch with a continuous prolene suture. Blood flow was established after flushing. Protamine was given to reverse the heparin effect. The groin wounds were carefully checked for hemostasis, irrigated and closed using running 3-0 Vicryl for subcutaneous tissue and a running subcuticular 4-0 Monocryl for the skin. Sterile dressings were placed. The patient tolerated the procedure well and was transported to the PACU in stable condition. Blood loss was 20. Sponge and instrument count was correct before closure.     Patient tolerated the procedure well. He was fully alert and awake without any problems and with improved pulses at the end of the procedure. We're optimistic that he will make a good recovery.

## 2019-06-20 NOTE — ANESTHESIA PROCEDURE NOTES
Arterial Line:    An arterial line was placed using ultrasound guidance, in the OR for the following indication(s): continuous blood pressure monitoring and blood sampling needed. A 20 gauge (size), (length), Arrow (type) catheter was placed, Seldinger technique used, into the left radial artery, secured by tape and Tegaderm. Anesthesia type: General    Events:  patient tolerated procedure well with no complications.   6/20/2019 11:49 AM6/20/2019 11:50 AM  Anesthesiologist: Uzma Prather MD  Resident/CRNA: FABIAN Wyatt CRNA  Preanesthetic Checklist  Completed: patient identified, site marked, surgical consent, pre-op evaluation, timeout performed, IV checked, risks and benefits discussed, monitors and equipment checked, anesthesia consent given, oxygen available and patient being monitored

## 2019-06-21 NOTE — CARE COORDINATION
Patient is from home; independent prior to admission. He has a PCP and insurance that assist with Rx when needed. Patient awaiting ambulation and removal of STEPH drain. Plan for discharge to home at this time. If patient is discharged with STEPH drain- C may be needed.  Marcos Brewer RN

## 2019-06-21 NOTE — PROGRESS NOTES
No results for Potassium sent at [phone], called lab and spoke with Memo Odom who will look into it.

## 2019-06-21 NOTE — PLAN OF CARE
Problem: Falls - Risk of:  Goal: Will remain free from falls  Description  Will remain free from falls  6/21/2019 0729 by Sulaiman Croft. Cari Malone RN  Outcome: Ongoing  6/20/2019 2015 by Marcy Scott. AHSAN Savage  Outcome: Ongoing  Goal: Absence of physical injury  Description  Absence of physical injury  6/21/2019 0729 by Sulaiman Croft. Cari Malone RN  Outcome: Ongoing  6/20/2019 2015 by Marcy Scott. AHSAN Savage  Outcome: Ongoing     Problem: Pain:  Goal: Pain level will decrease  Description  Pain level will decrease  6/21/2019 0729 by Sulaiman Croft. Cari Malone RN  Outcome: Ongoing  6/20/2019 2015 by Marcy Scott. AHSAN Savage  Outcome: Ongoing  Goal: Control of acute pain  Description  Control of acute pain  6/21/2019 0729 by Sulaiman Croft. Cari Malone RN  Outcome: Ongoing  6/20/2019 2015 by Marcy Scott. AHSAN Savage  Outcome: Ongoing  Goal: Control of chronic pain  Description  Control of chronic pain  6/21/2019 0729 by Sulaiman Croft. Cari Malone RN  Outcome: Ongoing  6/20/2019 2015 by Marcy Scott.  AHSAN Svaage  Outcome: Ongoing

## 2019-06-21 NOTE — PROGRESS NOTES
PATIENT NAME: Lore Loyola    TODAY'S DATE: 06/21/19    SUBJECTIVE:    Pt is POD # 1 s/p R femoral endarterectomy. C/o minimal pain. Has not been out of bed. OBJECTIVE:   VITALS:    Vitals:    06/21/19 1003   BP: 130/61   Pulse: 77   Resp: 16   Temp:    SpO2: 96%     INTAKE/OUTPUT:    Date 06/21/19 0000 - 06/21/19 2359   Shift 8353-3698 7450-8725 9162-4160 24 Hour Total   INTAKE   P.O. 50 360  410   I. V.(mL/kg/hr) 50(0.1)   50   IV Piggyback 100   100   Shift Total(mL/kg) 200(1.9) 360(3.5)  560(5.4)   OUTPUT   Urine(mL/kg/hr) 600(0.7) 500  1100   Drains 100   100   Shift Total(mL/kg) 700(6.8) 500(4.8)  1200(11.6)   Weight (kg) 103.2 103.2 103.2 103.2      Patient Vitals for the past 96 hrs (Last 3 readings):   Weight   06/21/19 0400 227 lb 8.2 oz (103.2 kg)   06/20/19 0911 232 lb (105.2 kg)       EXAM:  Blood pressure 130/61, pulse 77, temperature 98.1 °F (36.7 °C), temperature source Oral, resp. rate 16, height 5' 7\" (1.702 m), weight 227 lb 8.2 oz (103.2 kg), SpO2 96 %. General appearance: No apparent distress, appears stated age and cooperative. Skin: unremarkable  HEENT Normocephalic, atraumatic without obvious deformity. Neck: Supple, Trachea midline   Lungs: Good respiratory effort. Clear to auscultation, bilaterally  Heart: Regular rate/ rhythm   Abdomen: Soft, non-tender or non-distended   Extremities: min edema warm well perfused monophasic DP and PT pulses. R groin with minimal edema. STEPH output serosanguinous  Neurologic: Alert, grossly intact  Mental status: normal affect      Data:  CBC:   Recent Labs     06/19/19  1355 06/21/19  0400   WBC 9.6 10.2   HGB 11.0* 10.6*   HCT 38.2* 36.5*    291     BMP:    Recent Labs     06/19/19  1355 06/21/19  0400    136   K 4.5 5.8   CRITICAL CALLED TO LAURO SAENZ RN ON 6/21/19 AT 0558 BY BLAKE.   *    101   CO2 28 25   BUN 20 24*   CREATININE 1.3 1.4*   GLUCOSE 150* 175*     Hepatic: No results for input(s): AST, ALT, ALB, BILITOT, ALKPHOS in the last 72 hours. Mag:    No results for input(s): MG in the last 72 hours. Phos:   No results for input(s): PHOS in the last 72 hours. INR:   Recent Labs     19  1355   INR 0.99           ASSESSMENT AND PLAN:    Patient Active Problem List   Diagnosis    Essential hypertension    Mixed hyperlipidemia    Hyperglycemia    Left-sided carotid artery disease ([facility]): R     Restrictive lung disease    Renal insufficiency    Right knee pain    Squamous cell carcinoma of nose    Abdominal aortic aneurysm (AAA) without rupture (HCC)    Arthritis of both knees    PVD (peripheral vascular disease) ([facility]       S/P R femoral endarterectomy    Continue STEPH drain for now, output 270 cc serosanguinous. Increase activity, okay to ambulate. Encourage IS, wean O2. Pt wears 2L at home. DC IVF, continue lasix BID. Hyperkalemia, IV lasix given, recheck K this afternoon.      Pascual White PA-C

## 2019-06-22 NOTE — PROGRESS NOTES
PATIENT NAME: Kaylee Loyola    TODAY'S DATE: 06/22/19    SUBJECTIVE:    Pt is POD # 2 s/p R femoral endarterectomy. C/o minimal pain. Has not been out of bed. OBJECTIVE:   VITALS:    Vitals:    06/22/19 1703   BP: (!) 142/73   Pulse: 71   Resp: 21   Temp:    SpO2: (!) 85%     INTAKE/OUTPUT:    Date 06/22/19 0000 - 06/22/19 2359   Shift 2303-6862 3288-5422 3661-8945 24 Hour Total   INTAKE   P.O.  360 480 840   Shift Total(mL/kg)  360(3.5) 480(4.7) 840(8.1)   OUTPUT   Urine(mL/kg/hr) 1400(1.7) 900(1.1) 250 2550   Drains 25  70 95   Shift Total(mL/kg) 1425(13.8) 900(8.7) 320(3.1) 2645(25.6)   Weight (kg) 103.2 103.2 103.2 103.2      Patient Vitals for the past 96 hrs (Last 3 readings):   Weight   06/21/19 0400 227 lb 8.2 oz (103.2 kg)   06/20/19 0911 232 lb (105.2 kg)       EXAM:  Blood pressure (!) 142/73, pulse 71, temperature 98.1 °F (36.7 °C), temperature source Oral, resp. rate 21, height 5' 7\" (1.702 m), weight 227 lb 8.2 oz (103.2 kg), SpO2 (!) 85 %. General appearance: No apparent distress, appears stated age and cooperative. Skin: unremarkable  HEENT Normocephalic, atraumatic without obvious deformity. Neck: Supple, Trachea midline   Lungs: Good respiratory effort. Clear to auscultation, bilaterally  Heart: Regular rate/ rhythm   Abdomen: Soft, non-tender or non-distended   Extremities: min edema warm well perfused monophasic DP and PT pulses. R groin with minimal edema.  STEPH output serosanguinous  Neurologic: Alert, grossly intact  Mental status: normal affect      Data:  CBC:   Recent Labs     06/21/19  0400   WBC 10.2   HGB 10.6*   HCT 36.5*        BMP:    Recent Labs     06/21/19  0400 06/21/19  1510 06/22/19  0530     --  140   K 5.8   CRITICAL CALLED TO LAURO SAENZ RN ON 6/21/19 AT 0558 BY BLAKE.  * 5.5  CALLED TO CHINO Hazel [id number] AT 1695 JSTOUT MLS  RESULTS READ BACK  * 4.8     --  99   CO2 25  --  31   BUN 24*  --  33*   CREATININE 1.4*  --  1.2   GLUCOSE 175*  --  121*     Hepatic: No results for input(s): AST, ALT, ALB, BILITOT, ALKPHOS in the last 72 hours. Mag:    No results for input(s): MG in the last 72 hours. Phos:   No results for input(s): PHOS in the last 72 hours. INR:   No results for input(s): INR in the last 72 hours. ASSESSMENT AND PLAN:    Patient Active Problem List   Diagnosis    Essential hypertension    Mixed hyperlipidemia    Hyperglycemia    Left-sided carotid artery disease ([address]): R     Restrictive lung disease    Renal insufficiency    Right knee pain    Squamous cell carcinoma of nose    Abdominal aortic aneurysm (AAA) without rupture (HCC)    Arthritis of both knees    PVD (peripheral vascular disease) ([address]       S/P R femoral endarterectomy    Continue STPEH drain for now, output 270 cc serosanguinous. Increase activity, okay to ambulate. Encourage IS, wean O2. Pt wears 2L at home. DC IVF, continue lasix BID. Hyperkalemia, IV lasix given, recheck K this afternoon.      Hosptialist consulted for medical management

## 2019-06-22 NOTE — PROGRESS NOTES
Dr. Belle Tamayo at bedside and updated on patient, legs are becoming more red and warmer.  New order for Bactrim DS bid and consult Hospilist for tune up for CHF before discharge to [facility].

## 2019-06-22 NOTE — CONSULTS
History and Physical      Name:  Cynthia Ramirez /Age/Sex: 1934  (80 y.o. male)   MRN & CSN:  [id number] & [id number] Admission Date/Time: 2019  8:49 AM   Location:  -A PCP: Alejandro Donnelly MD       Cynthia Ramirez is a 80 y.o.  male  who presents with No chief complaint on file.       Assessment and Plan:   Possible CHF?  - check echo   - check BNP  - change lasix to 40 IV BID  - metoprolol  - strict I and O  - low sodium diet    Hyperkalemia  - resolved  - change bactrim to clindamycin as bactrim is known to cause high potassium    RLE Cellulitis  - on clindamycin    S/P Right Femoral Endarterectomy  - POD#2  - management per CTS    CAD  HTN  HLD  PVD                        Diet DIET CARDIAC; Low Sodium (2 GM)   Code Status Full Code     Medications:   Medications:    clindamycin  600 mg Oral 3 times per day    aspirin  81 mg Oral Daily    atorvastatin  10 mg Oral Nightly    losartan  100 mg Oral Daily    metoprolol succinate  50 mg Oral BID    potassium chloride  20 mEq Oral Daily    verapamil  240 mg Oral Daily    sodium chloride flush  10 mL Intravenous 2 times per day    furosemide  40 mg Oral BID    sodium chloride flush  10 mL Intravenous BID      Infusions:   PRN Meds:   albuterol 1.25 mg Q6H PRN   sodium chloride flush 10 mL PRN   acetaminophen 650 mg Q4H PRN   ondansetron 4 mg Q6H PRN   HYDROcodone 5 mg - acetaminophen 1 tablet Q4H PRN   hydrALAZINE 10 mg Q1H PRN   fentanNYL 25 mcg Q5 Min PRN   benzocaine-menthol 1 lozenge Q2H PRN       Current Facility-Administered Medications:     clindamycin (CLEOCIN) capsule 600 mg, 600 mg, Oral, 3 times per day, Cuco Shin MD    albuterol (PROVENTIL) nebulizer solution 1.25 mg, 1.25 mg, Nebulization, Q6H PRN, Celeste Slim, PA-C, 1.25 mg at 19 1538    aspirin EC tablet 81 mg, 81 mg, Oral, Daily, Celeste Slim, PA-C, 81 mg at 19 1009    atorvastatin (LIPITOR) tablet 10 mg, 10 mg, Oral, Nightly, Celeste Slim, PA-C, 10 mg at 06/21/19 2019    losartan (COZAAR) tablet 100 mg, 100 mg, Oral, Daily, John Coco, PA-C, 100 mg at 06/22/19 1008    metoprolol succinate (TOPROL XL) extended release tablet 50 mg, 50 mg, Oral, BID, John Coco, PA-C, 50 mg at 06/22/19 1009    potassium chloride (KLOR-CON) packet 20 mEq, 20 mEq, Oral, Daily, John Coco, PA-C, 20 mEq at 06/22/19 1008    verapamil (CALAN SR) extended release tablet 240 mg, 240 mg, Oral, Daily, John Coco, PA-C, 240 mg at 06/22/19 1012    sodium chloride flush 0.9 % injection 10 mL, 10 mL, Intravenous, 2 times per day, John Coco, PA-C, 10 mL at 06/22/19 1011    sodium chloride flush 0.9 % injection 10 mL, 10 mL, Intravenous, PRN, John Coco, PA-C    acetaminophen (TYLENOL) tablet 650 mg, 650 mg, Oral, Q4H PRN, John Coco, PA-C, 650 mg at 06/20/19 1930    ondansetron (ZOFRAN) injection 4 mg, 4 mg, Intravenous, Q6H PRN, John Coco, PA-C    HYDROcodone-acetaminophen (NORCO) 5-325 MG per tablet 1 tablet, 1 tablet, Oral, Q4H PRN **OR** [DISCONTINUED] HYDROcodone-acetaminophen (NORCO) 5-325 MG per tablet 2 tablet, 2 tablet, Oral, Q4H PRN, John Coco, PA-C, 2 tablet at 06/21/19 0114    hydrALAZINE (APRESOLINE) injection 10 mg, 10 mg, Intravenous, Q1H PRN, John Coco, PA-C    fentaNYL (SUBLIMAZE) injection 25 mcg, 25 mcg, Intravenous, Q5 Min PRN, Ernesto Gordon MD, 25 mcg at 06/20/19 1535    furosemide (LASIX) tablet 40 mg, 40 mg, Oral, BID, John Coco, PA-C, 40 mg at 06/22/19 1009    sodium chloride flush 0.9 % injection 10 mL, 10 mL, Intravenous, BID, Kingsley Valencia MD, 10 mL at 06/22/19 1011    benzocaine-menthol (CEPACOL SORE THROAT) lozenge 1 lozenge, 1 lozenge, Oral, Q2H PRN, Luisa Zamora MD    History of present illness     Chief Complaint: No chief complaint on file. Ignacio Marquez is a 80 y.o.  male  who presents with scheduled right femoral endarterectomy for his PVD.  We are consulted today by cardiothoracic team to help optimize possible CHF. Pt currently does not report any SOB or CP to me, does cough up clear phlegm he states, doesn't report leg swelling but does say he has redness on right leg which is non tender. Pt is no distress and resting comfortably. Review of Systems : Ten point ROS reviewed and negative, unless as noted above per HPI       Objective: Intake/Output Summary (Last 24 hours) at 6/22/2019 1155  Last data filed at 6/22/2019 0600  Gross per 24 hour   Intake 720 ml   Output 2385 ml   Net -1665 ml      Vitals:   Vitals:    06/22/19 1103   BP: (!) 164/73   Pulse: 65   Resp: 17   Temp:    SpO2: (!) 86%     Physical Exam:   Gen:  awake, alert, cooperative, no apparent distress  Head/Eyes:  Normocephalic atraumatic, EOMI   NECK:   symmetrical, trachea midline  LUNGS: Normal Effort   CARDIOVASCULAR:  Normal rate  ABDOMEN: Non tender, non distended, no HSM noted. MUSCULOSKELETAL:  ROM WNL  NEUROLOGIC: Alert and Oriented,  Cranial nerves II-XII are grossly intact. SKIN:  RLE erythema noted, non tender, no drainage. Past Medical History:      Past Medical History:   Diagnosis Date    Anemia     \"use to be on iron tablets\"    Arthritis     Carotid artery disease (HCC)     complete occlusion left ICA, status post right carotid endarterectomy    Cellulitis of right lower extremity 7/1/2016    Was admitted in [address] [place] [address] in 7/16    Chronic cough 12/18/2015    Chest x-ray showed mild pulmonary congestion that is chronic. And some atelectasis PFT showed no obstructive disease. Has restrictive lung disease.     CKD (chronic kidney disease)     per hx from ecf has listed as CKD stage 3 but pt is not aware of this dx    COPD (chronic obstructive pulmonary disease) (HCC)     H/O colonoscopy 8/10    Dr. Zuleima Sepulveda H/O Doppler ultrasound 08/2009, 2011,5/13    carotid doppler left % blocked, right patent    [place] (hard of hearing)     no hearing aides    HTN (hypertension)     Hyperglycemia connections:     Talks on phone: None     Gets together: None     Attends Spiritism service: None     Active member of club or organization: None     Attends meetings of clubs or organizations: None     Relationship status: None    Intimate partner violence:     Fear of current or ex partner: None     Emotionally abused: None     Physically abused: None     Forced sexual activity: None   Other Topics Concern    None   Social History Narrative    None       LABS  Recent Labs     06/19/19  1355 06/21/19  0400   WBC 9.6 10.2   HGB 11.0* 10.6*   HCT 38.2* 36.5*    291      Recent Labs     06/19/19  1355 06/21/19  0400 06/21/19  1510 06/22/19  0530    136  --  140   K 4.5 5.8   CRITICAL CALLED TO LAURO SAENZ RN ON 6/21/19 AT 0558 BY BLAKE.  * 5.5  CALLED TO CHINO FOREMAN Camila Libby [id number] AT 9660 JSTOUT MLS  RESULTS READ BACK  * 4.8    101  --  99   CO2 28 25  --  31   BUN 20 24*  --  33*   CREATININE 1.3 1.4*  --  1.2     No results for input(s): AST, ALT, ALB, BILIDIR, BILITOT, ALKPHOS in the last 72 hours. Recent Labs     06/19/19  1355   INR 0.99     No results for input(s): CKTOTAL, CKMB, CKMBINDEX, TROPONINT in the last 72 hours. Xr Chest Standard (2 Vw)    Result Date: 6/19/2019  EXAMINATION: TWO XRAY VIEWS OF THE CHEST 6/19/2019 2:55 pm COMPARISON: Chest radiograph 03/26/2018 HISTORY: ORDERING SYSTEM PROVIDED HISTORY: Hx of smoking TECHNOLOGIST PROVIDED HISTORY: Reason for exam:->preop chest xray- hx carotid stenosis , hx smoker Ordering Physician Provided Reason for Exam: pre op Type of Exam: Subsequent/Follow-up FINDINGS: Low lung volumes. Bilateral perihilar airspace opacities more prominent on the frontal view. Diffuse interstitial prominence with indistinct pulmonary vasculature but no definite interlobular septal thickening. No findings of pneumothorax or pleural effusion. Normal mediastinal and cardiac contours. Mildly prominent hilar contours.   Atherosclerotic calcification in the aorta. No acute fracture. Bilateral perihilar airspace opacities predominantly on the frontal view most likely due to atelectasis given low lung volumes. Underlying pulmonary vascular congestion may be present.        Electronically signed by Halley Queen MD on 6/22/2019 at 11:55 AM

## 2019-06-23 NOTE — PROGRESS NOTES
Hospitalist Progress Note      Name:  Lisandra Hinojosa /Age/Sex: 1934  (80 y.o. male)   MRN & CSN:  [id number] & [id number] Admission Date/Time: 2019  8:49 AM   Location:  -A PCP: Kevin Lisa MD       Lisandra Hinojosa is a 80 y.o.  male  who presents with No chief complaint on file. Assessment and Plan:   Possible CHF?   - ruled out  - check echo: ef 55-60%  - check BNP: WNL for age  - change lasix back to oral  - metoprolol  - strict I and O  - low sodium diet     Hyperkalemia  - resolved  - changed bactrim to clindamycin as bactrim is known to cause high potassium     RLE Cellulitis  -stable  - on clindamycin     S/P Right Femoral Endarterectomy  - POD#3  - management per CTS     CAD  HTN  HLD  PVD    Stable from medicine standpoint                   Diet DIET CARDIAC; Low Sodium (2 GM)   Code Status Full Code     Medications:   Medications:    clindamycin  600 mg Oral 3 times per day    furosemide  40 mg Intravenous BID    guaiFENesin  600 mg Oral BID    aspirin  81 mg Oral Daily    atorvastatin  10 mg Oral Nightly    losartan  100 mg Oral Daily    metoprolol succinate  50 mg Oral BID    potassium chloride  20 mEq Oral Daily    verapamil  240 mg Oral Daily    sodium chloride flush  10 mL Intravenous 2 times per day    sodium chloride flush  10 mL Intravenous BID      Infusions:   PRN Meds:   albuterol 1.25 mg Q6H PRN   sodium chloride flush 10 mL PRN   acetaminophen 650 mg Q4H PRN   ondansetron 4 mg Q6H PRN   HYDROcodone 5 mg - acetaminophen 1 tablet Q4H PRN   hydrALAZINE 10 mg Q1H PRN   fentanNYL 25 mcg Q5 Min PRN   benzocaine-menthol 1 lozenge Q2H PRN     Subjective:   Walked around the floor, no distress    Objective:        Intake/Output Summary (Last 24 hours) at 2019 1310  Last data filed at 2019 1123  Gross per 24 hour   Intake 850 ml   Output 3285 ml   Net -2435 ml      Vitals:   Vitals:    19 1100   BP: 127/65   Pulse: 70   Resp: 26   Temp: 97.5 °F (36.4 °C)   SpO2: 91%     Physical Exam:   Gen:  awake, alert, cooperative, no apparent distress  Head/Eyes:  Normocephalic atraumatic, EOMI   NECK:   symmetrical, trachea midline  LUNGS: Normal Effort   CARDIOVASCULAR:  Normal rate  ABDOMEN: Non tender, non distended, no HSM noted. MUSCULOSKELETAL:  ROM WNL  NEUROLOGIC: Alert and Oriented,  Cranial nerves II-XII are grossly intact.    SKIN:  no bruising or bleeding, normal skin color,  no redness      Data:       CBC   Recent Labs     06/21/19  0400   WBC 10.2   HGB 10.6*   HCT 36.5*         BMP Recent Labs     06/21/19  0400 06/21/19  1510 06/22/19  0530 06/23/19  0853     --  140 140   K 5.8   CRITICAL CALLED TO LAURO SAENZ RN ON 6/21/19 AT 0558 BY BLAKE.  * 5.5  CALLED TO CV[facility] AHSAN Kaiser [id number] AT 5871 JSTOUT MLS  RESULTS READ BACK  * 4.8 4.1     --  99 96*   CO2 25  --  31 33*   BUN 24*  --  33* 38*   CREATININE 1.4*  --  1.2 1.3         Electronically signed by Shiraz Arambula MD on 6/23/2019 at 1:10 PM

## 2019-06-24 PROBLEM — I70.209 FEMORAL ARTERY OCCLUSION (HCC): Status: ACTIVE | Noted: 2019-01-01

## 2019-06-24 NOTE — FLOWSHEET NOTE
Patient discharged to home (Assisted  Living) with son and portable 02 per wheelchair with RN--patient tolerated well--patient and son verbalize understanding of discharge instructions.

## 2019-06-24 NOTE — PROGRESS NOTES
PATIENT NAME: Anastasia Loyola    TODAY'S DATE: 06/23/19    SUBJECTIVE:    Pt is POD # 3 s/p R femoral endarterectomy. C/o minimal pain. Has not been out of bed. OBJECTIVE:   VITALS:    Vitals:    06/24/19 1202   BP: (!) 145/67   Pulse: 70   Resp: 20   Temp: 97.7 °F (36.5 °C)   SpO2: 97%     INTAKE/OUTPUT:    Date 06/24/19 0000 - 06/24/19 2359   Shift 4200-3823 6712-6542 1874-7287 24 Hour Total   INTAKE   Shift Total(mL/kg)       OUTPUT   Urine(mL/kg/hr) 675(0.8)   675   Shift Total(mL/kg) 675(6.7)   675(6.7)   Weight (kg) 100.3 100.3 100.3 100.3      Patient Vitals for the past 96 hrs (Last 3 readings):   Weight   06/24/19 0600 221 lb 1.9 oz (100.3 kg)   06/23/19 0700 227 lb 15.3 oz (103.4 kg)   06/21/19 0400 227 lb 8.2 oz (103.2 kg)       EXAM:  Blood pressure (!) 145/67, pulse 70, temperature 97.7 °F (36.5 °C), temperature source Oral, resp. rate 20, height 5' 7\" (1.702 m), weight 221 lb 1.9 oz (100.3 kg), SpO2 97 %. General appearance: No apparent distress, appears stated age and cooperative. Skin: unremarkable  HEENT Normocephalic, atraumatic without obvious deformity. Neck: Supple, Trachea midline   Lungs: Good respiratory effort. Clear to auscultation, bilaterally  Heart: Regular rate/ rhythm   Abdomen: Soft, non-tender or non-distended   Extremities: min edema warm well perfused monophasic DP and PT pulses. R groin with minimal edema. STEPH output serosanguinous  Neurologic: Alert, grossly intact  Mental status: normal affect      Data:  CBC:   No results for input(s): WBC, HGB, HCT, PLT in the last 72 hours. BMP:    Recent Labs     06/21/19  1510 06/22/19  0530 06/23/19  0853   NA  --  140 140   K 5.5  CALLED TO [facility] AHSAN Kingck [id number] AT [address] [facility] MLS  RESULTS READ BACK  * 4.8 4.1   CL  --  99 96*   CO2  --  31 33*   BUN  --  33* 38*   CREATININE  --  1.2 1.3   GLUCOSE  --  121* 232*     Hepatic: No results for input(s): AST, ALT, ALB, BILITOT, ALKPHOS in the last 72 hours.   Mag:    No results for input(s): MG in the last 72 hours. Phos:   No results for input(s): PHOS in the last 72 hours. INR:   No results for input(s): INR in the last 72 hours.         ASSESSMENT AND PLAN:    Patient Active Problem List   Diagnosis    Essential hypertension    Mixed hyperlipidemia    Hyperglycemia    Left-sided carotid artery disease ([facility]): R     Restrictive lung disease    Renal insufficiency    Right knee pain    Squamous cell carcinoma of nose    Abdominal aortic aneurysm (AAA) without rupture (HCC)    Arthritis of both knees    PVD (peripheral vascular disease) ([facility]    Femoral artery occlusion (HCC)       S/P R femoral endarterectomy    Remove STEPH drain   prob d/c to SNF in AM  Hosptialist medical management noted

## 2019-06-24 NOTE — OP NOTE
Date of Procedure:   19    Surgeon: Yamilex Taylor MD/ Naheed Teixeira MD    Assistant: Philomena Pete [facility]    Preoperative Diagnosis: rt common fem occlusion limb salvage ischemia.     Active Ambulatory Problems     Diagnosis Date Noted    Essential hypertension 08/15/2013    Mixed hyperlipidemia 08/15/2013    Hyperglycemia 2014    Left-sided carotid artery disease ([address]): R  2015    Restrictive lung disease 2016    Renal insufficiency 2017    Right knee pain 2018    Squamous cell carcinoma of nose 2018    Abdominal aortic aneurysm (AAA) without rupture ([address] 2018    Arthritis of both knees 05/10/2018    PVD (peripheral vascular disease) ([address] 06/10/2019     Resolved Ambulatory Problems     Diagnosis Date Noted    Hypothyroidism 08/15/2013    Tobacco abuse disorder 08/15/2013    Chronic cough 2015    Ulcer of right foot with fat layer exposed ([address] 2016     Past Medical History:   Diagnosis Date    Anemia     Arthritis     Carotid artery disease ([address]     Cellulitis of right lower extremity 2016    Chronic cough 2015    CKD (chronic kidney disease)     COPD (chronic obstructive pulmonary disease) ([address]     H/O colonoscopy 8/10    H/O Doppler ultrasound 2009, ,    [place] (hard of hearing)     HTN (hypertension)     Hyperglycemia 2014    Hyperlipidemia     Left-sided carotid artery disease ([address] 2015    On home oxygen therapy     PVD (peripheral vascular disease) ([address]     Squamous cell carcinoma of nose 2018    Tobacco abuse disorder 8/15/2013    Ulcer of right foot with fat layer exposed ([address] 2016    Wears glasses           Postoperative Diagnosis: Same    Operation:Right femoral endarterectomy and Cormatrix patch repair     Anesthesia: General    Findings: severe degree of periarteritis and scar formation from previous interventions   Heavily calcific, diffuse plaque of CFA into th e bifurcation. Procedure:  Patient was brought to the operating room after preoperative discussion with patient and family and review of the investigations. Preoperative arterial line Time out per check list confirmed    Under general anesthesia the entire rt leg and abdomen prepped and exposed   Incision made in the groin and Dissection carried down to the proximal CFA under the inguinal ligament    distal dissection was tedious and challenging due to extensive periarteritis and dense scar formation. distal CFA profunda and SFA dissected out   After heparin given,and clamps placed, the proximal CFA opened between clamps and extended to the proximal SFA. The heavy plaque dissected in the proximal CFA and removed from profunda and SFA. There was good back bleeding  Cormatrix patch used to close the arteriotomy. Protamine was given to reverse the Heparin. Hemostasis was secured and confirmed throughout surgery. Wound was irrigated with antibiotic solution and closed in layers leaving a STEPH drain    Blood loss was 50ml. Sponge and instrument count was correct before closure. Patient tolerated the procedure well. Intermediate and long term prognosis guarded due to the extensive disease and comorbid conditions. Wound infection likely due to distal cellulitis and multiple previous groin interventions and quality of the tissues.    Pt's family advised

## 2019-06-24 NOTE — DISCHARGE SUMMARY
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 Hospital Course:  Underwent surgery of R femoral endarterectomy after discussion and preparation. Post op ; monitored rhythm, O2 sat, I/O, Labs, CXRs serially  Neuro status , BP and vital signs monitored  Pt has CHF, hospitalist consulted for medical management. STEPH drain was removed. Started on clindamycin for cellulitis of lower legs. Started on diet and activity. At discharge, pt ambulating. Requiring O2 during the day, home O2 was ordered. Special concerns: none  Further plans after discharge: f/u with Dr. Tato Mathew in 2 weeks.      VS at discharge   Vitals:    06/24/19 0900   BP: 118/72   Pulse: 68   Resp: 20   Temp:    SpO2: 98%       Consults: hospitalist    Disposition: home    Patient Instructions:      Medication List      START taking these medications    clindamycin 300 MG capsule  Commonly known as:  CLEOCIN  Take 2 capsules by mouth every 8 hours for 10 days        CONTINUE taking these medications    albuterol 1.25 MG/3ML nebulizer solution  Commonly known as:  ACCUNEB     aspirin 81 MG tablet     atorvastatin 10 MG tablet  Commonly known as:  LIPITOR     furosemide 40 MG tablet  Commonly known as:  LASIX     INCRUSE ELLIPTA 62.5 MCG/INH Aepb  Generic drug:  Umeclidinium Bromide     losartan 100 MG tablet  Commonly known as:  COZAAR  Take 1 tablet by mouth daily     metoprolol succinate 50 MG extended release tablet  Commonly known as:  TOPROL XL  Take 1 tablet by mouth 2 times daily     Omega 3 1000 MG Caps     PLETAL 50 MG tablet  Generic drug:  cilostazol potassium chloride 20 MEQ packet  Commonly known as:  KLOR-CON     TYLENOL 8 HOUR ARTHRITIS PAIN PO     verapamil 240 MG extended release capsule  Commonly known as:  VERELAN  Take 1 capsule by mouth daily           Where to Get Your Medications      These medications were sent to [facility], [address]  [address] [facility],[address]    Phone:  [phone]   · clindamycin 300 MG capsule       Activity: activity as tolerated and no lifting, Driving, or Strenuous exercise until seen by physician in office  Diet: cardiac diet  Wound Care: as directed    Follow-up as directed at discharge    Signed: Donnia Habermann    Time spent on discharge 35 minutes

## 2019-08-17 PROBLEM — N17.9 ACUTE KIDNEY INJURY (HCC): Status: ACTIVE | Noted: 2019-01-01

## 2019-08-17 PROBLEM — I10 HYPERTENSION: Status: ACTIVE | Noted: 2019-01-01

## 2019-08-17 PROBLEM — I50.33 ACUTE ON CHRONIC DIASTOLIC CONGESTIVE HEART FAILURE (HCC): Status: ACTIVE | Noted: 2019-01-01

## 2019-08-17 PROBLEM — I11.0 CONGESTIVE HEART FAILURE DUE TO HIGH BLOOD PRESSURE (HCC): Status: ACTIVE | Noted: 2019-01-01

## 2019-08-17 NOTE — ED NOTES
Attempted to obtain urine per urinal. Pt states unable to get at this time.       Roxanna Li RN  08/17/19 1921

## 2019-08-17 NOTE — ED NOTES
Pt returned from xray. Pt back on cardiac monitor and continuous pulse oximetry and blood pressure monitoring. Remains on oxygen at 4 L per NC.  Daughter remains at bedside     [facility]  08/17/19 1914

## 2019-08-18 NOTE — H&P
grossly intact. Motor is 5 out of 5 bilaterally. Sensory is intact, no lateralizing findings. SKIN:  no bruising or bleeding, normal skin color, turgor, no redness,       Past Medical History:      Past Medical History:   Diagnosis Date    Acute on chronic diastolic congestive heart failure ([address] 8/17/2019    Anemia     \"use to be on iron tablets\"    Arthritis     Carotid artery disease (HCC)     complete occlusion left ICA, status post right carotid endarterectomy    Cellulitis of right lower extremity 7/1/2016    Was admitted in [address] [place] [address] in 7/16    Chronic cough 12/18/2015    Chest x-ray showed mild pulmonary congestion that is chronic. And some atelectasis PFT showed no obstructive disease. Has restrictive lung disease.  CKD (chronic kidney disease)     per hx from ecf has listed as CKD stage 3 but pt is not aware of this dx    COPD (chronic obstructive pulmonary disease) (HCC)     H/O colonoscopy 8/10    Dr. Carlos Manuel Skinner H/O Doppler ultrasound 08/2009, 2011,5/13    carotid doppler left % blocked, right patent    [place] (hard of hearing)     no hearing aides    HTN (hypertension)     Hyperglycemia 11/19/2014    A1c 6.1    Hyperlipidemia     Left-sided carotid artery disease ([address] 9/18/2015    Complete occlusion of left. R carotid endarterectomy Carotid doppler 5/13. R  Is patent. 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 Quit in 2016    Ulcer of right foot with fat layer exposed ([address] 07/08/2016    per pt on 6*19/2019\"all cleared up\"    Wears glasses      PSHX:  has a past surgical history that includes Carotid endarterectomy (01/2004);  Colonoscopy (?over 10 yrs ago); skin biopsy; other surgical

## 2019-08-18 NOTE — ED PROVIDER NOTES
Yes Historical Provider, MD   Acetaminophen (TYLENOL 8 HOUR ARTHRITIS PAIN PO) Take by mouth every 8 hours as needed   Yes Historical Provider, MD   albuterol (ACCUNEB) 1.25 MG/3ML nebulizer solution Inhale 1 ampule into the lungs every 6 hours as needed for Wheezing   Yes Historical Provider, MD   aspirin 81 MG tablet Take 81 mg by mouth daily   Yes Historical Provider, MD   atorvastatin (LIPITOR) 10 MG tablet Take 10 mg by mouth daily   Yes Historical Provider, MD   losartan (COZAAR) 100 MG tablet Take 1 tablet by mouth daily 5/10/18  Yes Jonathan Payne MD   metoprolol succinate (TOPROL XL) 50 MG extended release tablet Take 1 tablet by mouth 2 times daily  Patient taking differently: Take 25 mg by mouth 2 times daily  5/10/18  Yes Jonathan Payne MD   verapamil (VERELAN) 240 MG extended release capsule Take 1 capsule by mouth daily 5/10/18  Yes Jonathan Payne MD   Omega 3 1000 MG CAPS Take 2 capsules by mouth 2 times daily. Yes Historical Provider, MD   tetracycline (ACHROMYCIN;SUMYCIN) 250 MG capsule Take 1 capsule by mouth 2 times daily 7/9/19   Kingsley Lane MD       /61   Pulse 80   Temp 98 °F (36.7 °C) (Oral)   Resp 20   Ht 5' 7\" (1.702 m)   Wt 230 lb (104.3 kg)   SpO2 93%   BMI 36.02 kg/m²     Physical Exam   Constitutional: He is oriented to person, place, and time. He appears ill. No distress. HENT:   Head: Normocephalic and atraumatic. Eyes: Pupils are equal, round, and reactive to light. Neck: Normal range of motion. Cardiovascular: Intact distal pulses. Pulmonary/Chest: No respiratory distress. He has decreased breath sounds. Mild expiratory wheeze   Abdominal: Soft. He exhibits no distension. Musculoskeletal: He exhibits edema. He exhibits no tenderness. Neurological: He is alert and oriented to person, place, and time. He has normal strength. No sensory deficit. GCS eye subscore is 4. GCS verbal subscore is 5. GCS motor subscore is 6. Skin: Skin is warm. CT Head WO Contrast   Final Result   No acute intracranial abnormality. Generalized cerebral atrophy and chronic small vessel white matter ischemic   changes. 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 Patient chest xray shows left density which may be pneumonia considering his low grade fever report from NH and elevated WBC. This will need better dilineation with CT once Cr is corrected so contrast can be done. His BNP is elevated but when corrected for age it is with in normal ranges. His WBC elevation may be stress repsonse along with trop elevation due to cardiac stress. He was given antibiotics, breathing treatment and gentle fluids. CT was done due to his left sided weakness for 3 days but No evidence of stroke was seen only chronic changes. My typical dicussion, presentation,and considerations for this patients' chief complaint, diagnosis, differential diagnosis, medications, medication use,  medication safety and medication interactions have been explained and outlined to this patient for thispatient encounter. I have called hospitalist for admission. Final Impression    1. Hypoxia    2. Dyspnea, unspecified type    3.  Pneumonia of left lung due to infectious organism, unspecified part of lung              [address] [facility] [place],   08/18/19 7974

## 2019-08-19 NOTE — PROGRESS NOTES
input(s): INR in the last 72 hours.   Recent Labs     08/17/19  1830 08/17/19  2255 08/18/19  0500   TROPONINT 0.065* 0.054* 0.053*     HgBA1c:   Lab Results   Component Value Date    LABA1C 6.2 01/18/2018     CALCIUM:  8.5/29 (08/19 0545)    I/O last 3 completed shifts:  In: -   Out: 950 [Urine:950]    Intake/Output Summary (Last 24 hours) at 8/19/2019 1238  Last data filed at 8/19/2019 0948  Gross per 24 hour   Intake 240 ml   Output 200 ml   Net 40 ml

## 2019-08-19 NOTE — PROGRESS NOTES
Occupational Therapy   Occupational Therapy Initial Assessment  Date: 2019   Patient Name: Aziza Pack  MRN: [id number]     : 1934    Date of Service: 2019    Discharge Recommendations:  Continue to assess pending progress, ECF with OT, 24 hour supervision or assist, Patient would benefit from continued therapy after discharge  OT Equipment Recommendations  Other: TBD    Assessment   Performance deficits / Impairments: Decreased functional mobility ; Decreased safe awareness;Decreased balance;Decreased ADL status; Decreased cognition;Decreased ROM; Decreased endurance;Decreased high-level IADLs;Decreased strength;Decreased fine motor control  Assessment: Pt is a 79 yo male who was transferred from [facility] was indep with ADLs with prn assist from nursing. Pt presents with deficits listed above and would benefit from OT services to increase overall strength and endurance to increase safety and indep with self-care and functional transfers. Treatment Diagnosis: UE weakness  Prognosis: Fair  Decision Making: Medium Complexity  History: see above  Exam: see above  OT Education: OT Role;Transfer Training;Orientation;Plan of Care  REQUIRES OT FOLLOW UP: Yes  Activity Tolerance  Activity Tolerance: Patient Tolerated treatment well  Safety Devices  Safety Devices in place: Yes  Type of devices: All fall risk precautions in place; Patient at risk for falls;Gait belt;Call light within reach; Left in chair;Nurse notified           Patient Diagnosis(es): The primary encounter diagnosis was Hypoxia. Diagnoses of Dyspnea, unspecified type and Pneumonia of left lung due to infectious organism, unspecified part of lung were also pertinent to this visit.      has a past medical history of Acute on chronic diastolic congestive heart failure ([address] Anemia, Arthritis, Carotid artery disease ([address] Cellulitis of right lower extremity, Chronic cough, CKD (chronic kidney disease), COPD (chronic obstructive pulmonary disease) ([facility] H/O colonoscopy, H/O Doppler ultrasound, [place] (hard of hearing), HTN (hypertension), Hyperglycemia, Hyperlipidemia, Left-sided carotid artery disease ([facility] On home oxygen therapy, PVD (peripheral vascular disease) ([facility] Squamous cell carcinoma of nose, Tobacco abuse disorder, Ulcer of right foot with fat layer exposed ([facility] and Wears glasses. has a past surgical history that includes Carotid endarterectomy (01/2004); Colonoscopy (?over 10 yrs ago); skin biopsy; other surgical history (06/10/2019); and FEMORAL ENDARTERECTOMY (Right, 6/20/2019). Treatment Diagnosis: UE weakness      Restrictions  Restrictions/Precautions  Restrictions/Precautions: Fall Risk  Position Activity Restriction  Other position/activity restrictions: o2 per nasal canula, IV, telemtry    Subjective   General  Chart Reviewed: Yes  Patient assessed for rehabilitation services?: Yes  Family / Caregiver Present: No  Subjective  Subjective: Pt presents supine in bed with HOB elevated finishing eating breakfast.   Patient Currently in Pain: Denies  Vital Signs  Patient Currently in Pain: Denies  Social/Functional History  Social/Functional History  Lives With: Other (comment)(Pt was living at [facility] prior to this admission. )  Type of Home: Facility([facility])  Home Equipment: 4 wheeled walker, Oxygen  ADL Assistance: Independent(Pt reports he was able to perform UB/LB bathing indep at [facility]. )  Homemaking Responsibilities: No  Active : No  Leisure & Hobbies: Pt reports he watches TV. IADL Comments: Pt reports he was indep with ADLs including bathing/dressing. While at [facility], pt completes ADLs with SBA from nursing, however was able to perform indep. Additional Comments: Pt reports he wears o2 at night.         Objective   Vision: Impaired  Vision Exceptions: Wears glasses for reading  Hearing: Exceptions to [facility]  Hearing Exceptions: Hard of hearing/hearing concerns    Orientation  Overall Orientation

## 2019-08-20 NOTE — PROGRESS NOTES
TEMPERATURE:  Current - Temp: 98.1 °F (36.7 °C); Max - Temp  Av °F (36.7 °C)  Min: 97.2 °F (36.2 °C)  Max: 98.8 °F (37.1 °C)  RESPIRATIONS RANGE: Resp  Av.3  Min: 16  Max: 20  PULSE RANGE: Pulse  Av.3  Min: 89  Max: 96  BLOOD PRESSURE RANGE:  Systolic (39OKL), PUJ:478 , Min:132 , CRV:802   ; Diastolic (06ZMY), SDD:63, Min:60, Max:65    PULSE OXIMETRY RANGE: SpO2  Av.8 %  Min: 90 %  Max: 96 %  24HR INTAKE/OUTPUT:      Intake/Output Summary (Last 24 hours) at 2019 1423  Last data filed at 2019 0950  Gross per 24 hour   Intake 510 ml   Output --   Net 510 ml       OBJECTIVE:    General appearance: Conscious, coherent, cooperative in moderate distress from tachypnea  Head: atraumatic & normocephalic  Oral cavity: moist mucous membranes with normal dentition  Neck: supple with no lymphadenopathy, JVD down, trachea central  CVS: normal S1S2 with no additional heart sounds, +1 pitting edema noted in bilateral lower extremities  Respi: Good air entry in both lung fields with the wheezes heard in the bases and crepitus as well  Abdo: soft & non tender, with +ve bowel sounds, no guarding ,rigidity or rebound tenderness  CNS: no focal weakness or sensory deficits peripherally, DTR's equal bilaterally, CN2-12 normal  Skin: no rash, purpurea or eruptions  MS: no muscle tenderness, normal ROM in all major Joints      Data    Recent Labs     19  0523 19  0545 19  0500   WBC 17.1* 13.2* 15.8*   HGB 9.9* 9.5* 9.4*   HCT 33.9* 33.7* 32.7*    274 311      Recent Labs     19  0545 19  0500 19  1310    143 144   K 5.4* 4.7 4.9    101 101   CO2 29 32 30   BUN 44* 60* 56*   CREATININE 2.1* 2.3* 2.0*     No results for input(s): AST, ALT, ALB, BILIDIR, BILITOT, ALKPHOS in the last 72 hours.       Electronically signed by Dior Anna MD on 2019 at 2:23 PM      Comment: Please note this report has been produced using speech recognition

## 2019-08-21 NOTE — FLOWSHEET NOTE
Dr. Kedar Fuentes notified pt at 78% on high flow nasal canula at 6L. Maame from respiratory notified. At bedside providing breathing treatment and ABGS.    [id number] Dr. Kedar Fuentes at bedside

## 2019-08-21 NOTE — PROGRESS NOTES
PCP: Sy Lee MD  Hospital Day: 5    Chief Complaint on Admission: Worsening shortness of breath    SUBJECTIVE: Breathing is better, not a short of breath as yesterday    ASSESSMENT AND PLAN    1. Acute hypoxic respiratory failure: Some improvement seen, did drop his oxygen to 5 L, will continue with the diuresis with Lasix Lasix at a 60 mg IV twice daily and I will see how he does. Also likely being contributed with acute bronchitis    Acute bronchitis: Also seems to be improving, will continue with the cefepime but de-escalate his vancomycin to doxycycline and will see how he does.   We will also continue with the Solu-Medrol DuoNeb's    Acute diastolic heart failure: Continue with the diuresis with Lasix at the 60 mg IV twice daily    Hypertension: Currently blood pressures are well controlled, I will continue with the metoprolol at 25 mg twice daily along with the verapamil at 240 mg, patient is a well rate controlled    Acute on chronic renal impairment: Continues to improve, I will continue with the diuresis and monitor his renal function      Diet  renal   DVT Prophylaxis [x] Lovenox, [] Heparin, [] SCDs, [] Ambulation   GI Prophylaxis [] PPI, [x] H2 Blocker, [] Carafate, [] Diet/Tube Feeds   Code Status Full Code   Disposition ICU   MDM [] Low, [] Moderate,[x] High  Patient's risk as above due to complexity of data reviewed and medical management options                           ROS    Head: no headaches  Ears: no fullness or hearing loss  Eyes: no visual problems  Oral cavity: no dryness or excessive thirst  Neck: no fullness, stiffness  CVS: no chest wall tenderness, no CP, or palpitations  Respi: Shortness of breath but no cough  GI: no nausea, vomiting,diarrhea  : no frequency or urgency  CNS: no focal weakness or incoordination  Skin: no rash, itching or bruising  MS: no joint weakness or

## 2019-08-22 NOTE — CONSULTS
carotid endarterectomy Carotid doppler 5/13. R  Is patent. 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 Quit in 2016    Ulcer of right foot with fat layer exposed ([address] 07/08/2016    per pt on 6*19/2019\"all cleared up\"    Wears glasses        PAST SURGICAL HISTORY    Past Surgical History:   Procedure Laterality Date    CAROTID ENDARTERECTOMY  01/2004    right    COLONOSCOPY  ? over 10 yrs ago    FEMORAL ENDARTERECTOMY Right 6/20/2019    FEMORAL ENDARTERECTOMY RIGHT performed by Rufino Gomez MD at [address] [facility]  06/10/2019    per old chart had peripheral angiogram done    SKIN BIOPSY      skin cancer removed from nose- left side of nose and did sinus cavity\" 3/2018       FAMILY HISTORY    Family History   Problem Relation Age of Onset    Cancer Mother     Heart Attack Father     Stroke Brother     Coronary Art Dis Brother     Coronary Art Dis Brother        SOCIAL HISTORY    Social History     Tobacco Use    Smoking status: Former Smoker     Packs/day: 1.00     Years: 55.00     Pack years: 55.00     Types: Cigars, Cigarettes     Last attempt to quit: 7/1/2016     Years since quitting: 3.1    Smokeless tobacco: Never Used   Substance Use Topics    Alcohol use: Not Currently     Alcohol/week: 0.0 standard drinks     Comment: rare/ per pt on 6/19/2019\"quit 2018- use to drink 1-2 tmes per month\"    Drug use: No       ALLERGIES    Allergies   Allergen Reactions    Clindamycin/Lincomycin Rash       MEDICATIONS    No current facility-administered medications on file prior to encounter.       Current Outpatient Medications on File Prior to Encounter   Medication Sig Dispense Refill    metoprolol Unsuccessful      [x] Verbal Understanding  [] Demonstrated understanding       [] No evidence of learning  [] Refused teaching         [] N/A       Electronically signed by Sada Reyez RN,  on 8/22/2019 at 1:57 PM

## 2019-08-22 NOTE — PROGRESS NOTES
Home Exercise Program, Equipment Evaluation, Education, & procurement, ROM, Transfer Training, Gait Training, Safety Education & Training, Balance Training, ADL/Self-care Training, Endurance Training, Patient/Caregiver Education & Training, Positioning(ther ex, ther act, bed mobility)  Safety Devices  Type of devices: Gait belt, Nurse notified, Call light within reach, Left in chair    Goals  Short term goals  Time Frame for Short term goals: 1 week or until discharge:   Short term goal 1: Pt will demonstrate bed mobility mod I with HOB flat and no HR. Short term goal 2: Pt will demonstrate the ability to safely transfer sit to stand and stand to sit from 3 different height surfaces with supervision and proper hand placement with no cues. Short term goal 3: Pt will demonstrate the ability to safely ambulate 100 feet with a RW and CGA with no rest breaks in order to improve endurance.         Therapy Time   Individual Concurrent Group Co-treatment   Time In 8724         Time Out 1715         Minutes 25         Timed Code Treatment Minutes: [address], [place] DPT [id number]

## 2019-08-22 NOTE — CARE COORDINATION
Chart reviewed and called Alyssa Cloud at [facility] to give her an update on this patient. Left VM. PT has not evaluated this patient, OT did on the 8/19 and stated ECF w/OT w/24 hour assist. He can go back to his AL apartment but he could also go back to [facility] for skilled rehab. Waiting on Kourtney to call CM back about whether they can offer rehab.

## 2019-08-22 NOTE — PROGRESS NOTES
Nutrition Assessment    Type and Reason for Visit: Initial    Nutrition Recommendations: None at this itme    Nutrition Assessment: Pt with CHF, respiratory failure, meals intakes improving % of meals on cardiac diet    Malnutrition Assessment:  · Malnutrition Status: Insufficient data  · Context:  acute illness    Nutrition Risk Level: Low    Nutrient Needs:  · Estimated Daily Total Kcal: 3913-4418  · Estimated Daily Protein (g): 67-80(1-1.2)  · Estimated Daily Total Fluid (ml/day): 1680-2015(1mlkcal)    Nutrition Diagnosis:   · Problem: Inadequate energy intake, In context of acute illness or injury  · Etiology: related to Impaired respiratory function-inability to consume food     Signs and symptoms:  as evidenced by Other (Comment)    Objective Information:  · Nutrition-Focused Physical Findings: Bharat Score 3  · Wound Type: None  · Current Nutrition Therapies:  · Oral Diet Orders: Cardiac   · Oral Diet intake: 51-75%, %  · Oral Nutrition Supplement (ONS) Orders: None  · ONS intake: Unable to assess  · Anthropometric Measures:  · Ht: 5' 7\" (170.2 cm)   · Current Body Wt: 218 lb (98.9 kg)  · Admission Body Wt: 230 lb (104.3 kg)  · Usual Body Wt: (220-230# from previous encounters)  · % Weight Change:  ,  5.2% (12#) in less than 1 week due to diuretic use  · Ideal Body Wt: 148 lb (67.1 kg), % Ideal Body 147%  · BMI Classification: BMI 30.0 - 34.9 Obese Class I(34.3)    Nutrition Interventions:   Continue current diet  Continued Inpatient Monitoring, Education Completed    Nutrition Evaluation:   · Evaluation: Goals set   · Goals: Oral intakes will continue to improve to consistently meet at least 75% of his estimated nutritional needs    · Monitoring: Meal Intake, Weight, Pertinent Labs      Electronically signed by Marybel Castellano RD, LD on 8/22/19 at 11:19 AM    Contact Number: [phone]

## 2019-08-23 NOTE — CARE COORDINATION
CM spoke with [facility] at [facility]. Patient was admitted from [place] assisted living. Patient will require skilled nursing upon return. [place] states that she will not have a bed available until Monday 8/26. Patient has [facility], pre-cert is not needed to return to skilled nursing per [place] (she can admit patient then submit to [facility]).      11:03 AM  CORTEZ completed the PASRR electronically

## 2019-08-23 NOTE — PROGRESS NOTES
This RN assisted physician assessment of wound on right buttock. calmoseptine applied to bottom; mepilex applied to wound. Patient tolerated well, no s/s of infection. Will continue to monitor.

## 2019-08-23 NOTE — PROGRESS NOTES
This RN was notified by respiratory that IV was out of patient's arm and blood was on blankets. This RN noted IV catheter was intact. Patient cleaned up, dressing applied. Patient bed linen changed at this time. Patient assisted x1 w/ walker to chair, tolerated transfer well. Appears stable, no s/s of distress, will continue to monitor.

## 2019-08-23 NOTE — DISCHARGE INSTR - COC
Continuity of Care Form    Patient Name: Aziza Pack   :  1934  MRN:  [id number]    Admit date:  2019  Discharge date:  ***    Code Status Order: Full Code   Advance Directives:   [facility] Documentation     Date/Time Healthcare Directive Type of Healthcare Directive Copy in [address] Agent's Name Healthcare Agent's Phone Number    [phone]  Yes, patient has an advance directive for healthcare treatment  --  --  --  --  --          Admitting Physician:  Manuel Lee MD  PCP: Felicia Moya MD    Discharging Nurse: [facility] Unit/Room#: 008/008-01  Discharging Unit Phone Number: ***    Emergency Contact:   Extended Emergency Contact Information  Primary Emergency Contact: Osvaldo [address] [place] [address] Phone: [phone]  Mobile Phone: [phone]  Relation: Child  Secondary Emergency Contact: Reina Wilkerson [address] Phone: [phone]  Relation: Child    Past Surgical History:  Past Surgical History:   Procedure Laterality Date    CAROTID ENDARTERECTOMY  2004    right    COLONOSCOPY  ? over 10 yrs ago   [facility] Right 2019    FEMORAL ENDARTERECTOMY RIGHT performed by Raudel Kenny MD at [facility]  06/10/2019    per old chart had peripheral angiogram done    SKIN BIOPSY      skin cancer removed from nose- left side of nose and did sinus cavity\" 3/2018       Immunization History:   Immunization History   Administered Date(s) Administered    Influenza Vaccine, unspecified formulation 10/05/2016    Influenza Virus Vaccine 11/15/2012, 10/15/2014, 10/07/2015    Influenza, High Dose (Fluzone 65 yrs and older) 10/17/2017    Pneumococcal Conjugate 13-valent ([id number]) 10/07/2015    Pneumococcal Polysaccharide ([id number]) 2010    Zoster Live (Zostavax) 2014       Active Problems:  Patient Active Problem List   Diagnosis Code

## 2019-08-24 NOTE — FLOWSHEET NOTE
and reason  [] Spoke with (team    member) regarding   Subjective observations: Patient sitting on toilet and requires some assistance. [x]   Gait belt used during tx session   Pain level/location: Pre-tx 0/10  Post-tx 0/10   Bed Mobility   N/A     Transfers Sit to stand CG assist  Stand to sit CG assist  Commode min assist with cues for hand placement   Standing Tolerance  with ambulation and hygeine x5'   Amb/Locomotion: AD/Distance/Assist Pt ambulated 50 feet with CG assist using FWW, cues required for increased step length. Patient limited by coughing. Steps (#)/Assist/Rails/AD N/A   Ramp:AD/Assist N/A   Curb:height AD/Assist N/A   Uneven:type AD/Assist N/A   W/C distance/Assist   N/A   Strategies that improved performance: PLB   Barriers to progress/participation: coughing   Alarm placed/where? Fall Risk  [ ] [facility] bed  [facility] ] call light within reach  [ ] bed alarm on  [ ] personal alarm on [ ] [de-identified]  [ ] other staff present:   Discharge recommendations  Anticipated discharge date:  TBD  Destination: []home alone   []home alone with assist prn  []Continuous supervision  [x]SNF  [] Assisted living   Continued therapy: []HHC PT  []OUTPATIENT  PT   [x]  Further PT  Equipment needs: TBD     Therapeutic activities/exercises completed this date:     Modality/intervention used:   [facility]udd ] Therapeutic Exercise    [ ] Modalities:   [facility] ] Therapeutic Activity    [ ] Ultrasound   [ ] Elec Stim   [facility] ] Gait Training     [ ] Cervical Traction  [ ] Lumbar Traction   [ ] Neuromuscular Re-education   [ ] Cold/hotpack  [ ] Iontophoresis   [ ] Instruction in HEP     [ ] Diann Coronado   [ ] Manual Therapy   [ ] Aquatic Therapy     Patient/Caregiver Education and Training: PLB    Exercise/Equipment/Modalities this date   Seated marching 10x B, LAQ 10x B, hip abd 10x B, ankle pumps 10x B, shoulder flex 10x B, attempted scap retract but patient unable to perform.                      Treatment Plan for Next Session: Continue with ambulation tolerance.      Assessment / Impression                                                          Treatment/Activity Tolerance:   [x] Tolerated Treatment well:     [x] Patient limited by fatigue/pain:       [] Patient limited by medical complications:    [] Adverse Reaction to Tx:   [] Significant change in status    Plan:   Eliane ] Continue per plan of care [ ] Amando Navarrete current plan   [ ] Plan of care initiated [ ] Hold pending MD visit [ ] Discharged    Billing:  Billed units: 18'/ 25' (1 gait)      Signed: Zee Mathews, 8/24/2019, 8:11 AM  PT [id number], MPT, AT, NOEL, KAI  8/24/2019  10:16 AM

## 2019-08-24 NOTE — PROGRESS NOTES
Patient noted to bed in atrial fibrillation on heart monitor, rate in 80's. EKG ordered at this time.    Kevon Lee  4:35 AM

## 2019-08-24 NOTE — PROGRESS NOTES
PCP: Norma Freeman MD  Hospital Day: 8    Chief Complaint on Admission: Worsening shortness of breath    SUBJECTIVE: Breathing is better, not a short of breath as yesterday    ASSESSMENT AND PLAN    1. Acute hypoxic respiratory failure: Continues to improve, now down to 3 L of supplemental oxygen, will continue with PO Lasix along with the metolazone. Acute bronchitis:  We will continue with the doxycycline but I did stop his cefepime and switch him to Augmentin and will also continue with the nebulizers and will see how he does    Acute diastolic heart failure: Continues to have good response to p.o. diuretics as well, will continue with the metolazone at a reduced dose Lasix at a reduced dose and will see how he does    Hypertension: Currently blood pressures are well controlled, I will continue with the metoprolol at 25 mg twice daily along with the verapamil at 240 mg, patient is a well rate controlled    Acute on chronic renal impairment: Continues to improve, I will continue with the diuresis and monitor his renal function    Coccyx wound: CT done showed a superficial wound with no deep-seated abnormality, reviewed by Dr. Mishel Contreras the general surgeon advised to continue with antibiotics and follow-up as an outpatient      Diet  renal   DVT Prophylaxis [x] Lovenox, [] Heparin, [] SCDs, [] Ambulation   GI Prophylaxis [] PPI, [x] H2 Blocker, [] Carafate, [] Diet/Tube Feeds   Code Status Full Code   Disposition ICU   MDM [] Low, [] Moderate,[x] High  Patient's risk as above due to complexity of data reviewed and medical management options                           ROS    Head: no headaches  Ears: no fullness or hearing loss  Eyes: no visual problems  Oral cavity: no dryness or excessive thirst  Neck: no fullness, stiffness  CVS: no chest wall tenderness, no CP, or palpitations  Respi: Some shortness of breath punctuation, and spelling, as well as words and phrases that may be inappropriate.  If there are any questions or concerns please feel free to contact the dictating provider for clarification

## 2019-08-25 PROBLEM — J96.22 ACUTE ON CHRONIC RESPIRATORY FAILURE WITH HYPOXIA AND HYPERCAPNIA (HCC): Status: ACTIVE | Noted: 2019-01-01

## 2019-08-25 PROBLEM — N17.9 ACUTE KIDNEY INJURY (HCC): Status: RESOLVED | Noted: 2019-01-01 | Resolved: 2019-01-01

## 2019-08-25 PROBLEM — J96.21 ACUTE ON CHRONIC RESPIRATORY FAILURE WITH HYPOXIA AND HYPERCAPNIA (HCC): Status: ACTIVE | Noted: 2019-01-01

## 2019-08-25 PROBLEM — J96.00 ACUTE RESPIRATORY FAILURE (HCC): Status: ACTIVE | Noted: 2019-01-01

## 2019-08-25 NOTE — DISCHARGE SUMMARY
BMI 34.24 kg/m²         Head: atraumatic & normocephalic  Ears: TM's bialterall normal with normal canals  Eyes: without pallor or icterus  Oral cavity: moist mucous membranes with normal dentition  Neck: supple with no lymphadenopathy, JVD down, trachea central  CVS: normal S1S2 with no additional heart sounds, no lower extremity edema  Respi: good air entry in both lung fields with no other adventious breath sounds  GI: soft & non tender, with +ve bowel sounds, no guarding ,rigidity or rebound tenderness  : no inguinal lymphadenopathy, no CVA tenderness  CNS: no focal weakness or sensory deficits peripherally, DTR's equal bilaterally, CN2-12 normal  Skin: no rash, purpurea or eruptions  MS: normal muscle strength, normal ROM in all major joints      Procedures:  BiPAP on and off    Significant Diagnostic Studies at discharge:   None significant    Patient Instructions:   Camila Alvarado   [place] Medication Instructions BAN:[id number]    Printed on:08/25/19 1572   Medication Information                      Acetaminophen (TYLENOL 8 HOUR ARTHRITIS PAIN PO)  Take by mouth every 8 hours as needed             albuterol (ACCUNEB) 1.25 MG/3ML nebulizer solution  Inhale 1 ampule into the lungs every 6 hours as needed for Wheezing             aspirin 81 MG tablet  Take 81 mg by mouth daily             atorvastatin (LIPITOR) 10 MG tablet  Take 10 mg by mouth daily             Cholecalciferol (VITAMIN D3) 04904 units CAPS  Take 1 capsule by mouth Indications: give 1 capsule once daily every month starting on the 15th for 1 day             cilostazol (PLETAL) 50 MG tablet  Take 50 mg by mouth 2 times daily             diphenhydrAMINE (BENADRYL) 25 MG tablet  Take 25 mg by mouth every 6 hours as needed for Itching             furosemide (LASIX) 40 MG tablet  Take 40 mg by mouth daily             ipratropium-albuterol (DUONEB) 0.5-2.5 (3) MG/3ML SOLN nebulizer solution  Inhale 1 vial into the lungs 4 times daily

## 2019-08-25 NOTE — H&P
-  Doe catheter is present. LYMPH  -No palpable cervical lymphadenopathy and no hepatosplenomegaly. No petechiae or ecchymoses. MS  - B/L extremities strong muscles strength. Full movements. No gross joint deformities. No swelling, intact sensation symmetrical.   SKIN  - Normal coloration, warm, dry. No open wounds or ulcers. NEURO  -CN 2-12 appear grossly intact, normal speech, no lateralizing weakness. PSYC  -Awake, alert, oriented x 4. Appropriate affect. Limited verbal interaction. Past Medical History:      Past Medical History:   Diagnosis Date    Acute on chronic diastolic congestive heart failure ([facility] 8/17/2019    Anemia     \"use to be on iron tablets\"    Arthritis     Carotid artery disease (HCC)     complete occlusion left ICA, status post right carotid endarterectomy    Cellulitis of right lower extremity 7/1/2016    Was admitted in [address] [place] [address] in 7/16    Chronic cough 12/18/2015    Chest x-ray showed mild pulmonary congestion that is chronic. And some atelectasis PFT showed no obstructive disease. Has restrictive lung disease.  CKD (chronic kidney disease)     per hx from [facility] has listed as CKD stage 3 but pt is not aware of this dx    COPD (chronic obstructive pulmonary disease) (HCC)     H/O colonoscopy 8/10    Dr. Daysi Kimbrough H/O Doppler ultrasound 08/2009, 2011,5/13    carotid doppler left % blocked, right patent    [place] (hard of hearing)     no hearing aides    HTN (hypertension)     Hyperglycemia 11/19/2014    A1c 6.1    Hyperlipidemia     Left-sided carotid artery disease ([facility] 9/18/2015    Complete occlusion of left. R carotid endarterectomy Carotid doppler 5/13. R  Is patent.  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 Quit in 2016    Ulcer of right foot with fat layer exposed ([address] 07/08/2016    per pt on 6*19/2019\"all cleared up\"    Wears glasses      Past Surgery History:  Patient  has a past surgical history that includes Carotid endarterectomy (01/2004); Colonoscopy (?over 10 yrs ago); skin biopsy; other surgical history (06/10/2019); and FEMORAL ENDARTERECTOMY (Right, 6/20/2019). Social History:    FAM HX: Assessed: family history includes Cancer in his mother; Coronary Art Dis in his brother and brother; Heart Attack in his father; Stroke in his brother. Soc HX:   Social History     Socioeconomic History    Marital status:       Spouse name: Not on file    Number of children: 3    Years of education: 6    Highest education level: Not on file   Occupational History    Occupation: retired   Social Needs    Financial resource strain: Not on file    Food insecurity:     Worry: Not on file     Inability: Not on file    Transportation needs:     Medical: Not on file     Non-medical: Not on file   Tobacco Use    Smoking status: Former Smoker     Packs/day: 1.00     Years: 55.00     Pack years: 55.00     Types: Cigars, Cigarettes     Last attempt to quit: 7/1/2016     Years since quitting: 3.1    Smokeless tobacco: Never Used   Substance and Sexual Activity    Alcohol use: Not Currently     Alcohol/week: 0.0 standard drinks     Comment: rare/ per pt on 6/19/2019\"quit 2018- use to drink 1-2 tmes per month\"    Drug use: No    Sexual activity: Yes     Partners: Female   Lifestyle    Physical activity:     Days per week: Not on file     Minutes per session: Not on file    Stress: Not on file   Relationships    Social connections:     Talks on phone: Not on file     Gets together: Not on file     Attends Gnosticism service: Not on file     Active member of club or organization: Not on file     Attends meetings of clubs or organizations: Not on file     Relationship status: Not on file    Intimate partner violence:     Fear of current or ex partner: Not on file     Emotionally abused: Not on file     Physically abused: Not on file     Forced sexual activity: Not on file   Other Topics Concern    Not on file   Social History Narrative    Not on file     TOBACCO:   reports that he quit smoking about 3 years ago. His smoking use included cigars and cigarettes. He has a 55.00 pack-year smoking history. He has never used smokeless tobacco.  ETOH:   reports that he drank alcohol. Drugs:  reports that he does not use drugs. Allergies: Allergies   Allergen Reactions    Clindamycin/Lincomycin Rash     Medications:   Medications:    [START ON 8/26/2019] enoxaparin  30 mg Subcutaneous Daily    sodium chloride flush  10 mL Intravenous 2 times per day    acetylcysteine  600 mg Inhalation BID    amoxicillin-clavulanate  1 tablet Oral 2 times per day    [START ON 8/26/2019] aspirin  81 mg Oral Daily    [START ON 8/26/2019] atorvastatin  10 mg Oral Daily    cilostazol  50 mg Oral BID    doxycycline hyclate  100 mg Oral 2 times per day    furosemide  40 mg Oral BID    ipratropium-albuterol  1 vial Inhalation 4x Daily    [START ON 8/26/2019] metOLazone  2.5 mg Oral Daily    metoprolol succinate  25 mg Oral BID    omega-3 acid ethyl esters  2 g Oral BID    [START ON 8/26/2019] potassium chloride  20 mEq Oral Daily    [START ON 8/26/2019] verapamil  240 mg Oral Daily      Infusions:   PRN Meds:     nitroGLYCERIN 0.4 mg Q5 Min PRN   magnesium hydroxide 30 mL Daily PRN   ondansetron 4 mg Q6H PRN   sodium chloride flush 10 mL PRN   acetaminophen 650 mg Q8H PRN   albuterol 1.25 mg Q6H PRN   albuterol sulfate HFA 1 puff Q6H PRN   diphenhydrAMINE 25 mg Q6H PRN   guaiFENesin-dextromethorphan 5 mL Q4H PRN     Prior to Admission Meds:  Prior to Admission medications    Medication Sig Start Date End Date Taking?  Authorizing Provider   metoprolol succinate (TOPROL XL) 25 MG extended release tablet Take 25 mg by mouth 2 times daily    Historical Provider, MD   ipratropium-albuterol (DUONEB) 0.5-2.5 (3) MG/3ML SOLN nebulizer solution Inhale 1 vial into the lungs 4 times daily    Historical Provider, MD   diphenhydrAMINE (BENADRYL) 25 MG tablet Take 25 mg by mouth every 6 hours as needed for Itching    Historical Provider, MD   Cholecalciferol (VITAMIN D3) 32115 units CAPS Take 1 capsule by mouth Indications: give 1 capsule once daily every month starting on the 15th for 1 day    Historical Provider, MD   Umeclidinium Bromide (INCRUSE ELLIPTA) 62.5 MCG/INH AEPB Inhale into the lungs daily    Historical Provider, MD   furosemide (LASIX) 40 MG tablet Take 40 mg by mouth daily    Historical Provider, MD   cilostazol (PLETAL) 50 MG tablet Take 50 mg by mouth 2 times daily    Historical Provider, MD   potassium chloride (KLOR-CON) 20 MEQ packet Take 20 mEq by mouth daily    Historical Provider, MD   Acetaminophen (TYLENOL 8 HOUR ARTHRITIS PAIN PO) Take by mouth every 8 hours as needed    Historical Provider, MD   albuterol (ACCUNEB) 1.25 MG/3ML nebulizer solution Inhale 1 ampule into the lungs every 6 hours as needed for Wheezing    Historical Provider, MD   aspirin 81 MG tablet Take 81 mg by mouth daily    Historical Provider, MD   atorvastatin (LIPITOR) 10 MG tablet Take 10 mg by mouth daily    Historical Provider, MD   losartan (COZAAR) 100 MG tablet Take 1 tablet by mouth daily 5/10/18   Juanita Rutherford MD   verapamil (VERELAN) 240 MG extended release capsule Take 1 capsule by mouth daily 5/10/18   Juanita Rutherford MD   Omega 3 1000 MG CAPS Take 2 capsules by mouth 2 times daily.     Historical Provider, MD     Data:     Laboratory this visit:  Reviewed  Recent Labs     08/23/19  0530 08/24/19  0530 08/25/19  0450   WBC 15.6* 15.9* 17.2*   HGB 10.1* 9.9* 10.4*   HCT 33.5* 33.1* 35.1*    302 304      Recent Labs     08/23/19  0530 08/24/19  0530 08/25/19  0450    140 142   K 4.2 3.7 3.8   CL 92* 92* 92*   CO2 35* 30 32   BUN 61* 62* 56*   CREATININE 1.7* 1.7* 1.6*     No results for input(s): AST, ALT, ALB, BILIDIR, BILITOT, ALKPHOS in the last 72 hours. No results for input(s): INR in the last 72 hours. No results for input(s): CKTOTAL, CKMB, CKMBINDEX in the last 72 hours. Invalid input(s): Yonny Egan input(s): PRO-BNP    Component      Latest Ref Rng & Units 8/25/2019 8/21/2019 8/21/2019           9:18 AM  3:33 PM  3:23 PM   pH, Bld      7.34 - 7.45 7.43 7.36 7.40   pCO2, Arterial      32 - 45 MMHG 61.4 (H) 58.5 (H) 51.7 (H)   pO2, Arterial      75 - 100 MMHG 48.8 (L) 82.9 36.8 (L)   Base Exc, Mixed      0 - 1.2 13.7 (H) 6.1 (H) 6.4 (H)   Base Excess      0 - 3.3 HIDE HIDE HIDE   HCO3, Arterial      18 - 23 MMOL/L 40.5 (H) 33.2 (H) 32.4 (H)   CO2 Content      19 - 24 MMOL/L 42.4 (H) 35.0 (H) 34.0 (H)   O2 Sat      96 - 97 % 83.5 (L) 95.3 (L) 69.2 (L)     Radiology this visit:  Reviewed. Xr Chest Standard (2 Vw)    Result Date: 8/21/2019  EXAMINATION: TWO XRAY VIEWS OF THE CHEST 8/21/2019 10:03 am COMPARISON: 08/17/2019. HISTORY: ORDERING SYSTEM PROVIDED HISTORY: SOB TECHNOLOGIST PROVIDED HISTORY: Reason for exam:->SOB Reason for Exam: sob Acuity: Acute Type of Exam: Subsequent/Follow-up Additional signs and symptoms: sob Relevant Medical/Surgical History: sob FINDINGS: The cardiac silhouette is enlarged. Vascular calcifications are noted along the aortic arch. There is mild pulmonary vascular congestion. There is a small left pleural effusion. No right pleural effusion. Degenerative changes are noted along the spine. 1. Cardiomegaly with mild pulmonary vascular congestion, increased. 2. Small left pleural effusion.      Xr Chest Standard (2 Vw)    Result Date: 8/17/2019  EXAMINATION: TWO XRAY VIEWS OF THE CHEST 8/17/2019 7:06 pm COMPARISON: June 24 HISTORY: ORDERING SYSTEM PROVIDED HISTORY: Chest pain TECHNOLOGIST PROVIDED HISTORY: Reason for exam:->Chest pain Reason for Exam: DDD.  Disc osteophyte complex measures 3 mm. Ligamentum flavum thickening. Mild spinal canal stenosis. Severe bilateral neural foraminal stenosis due to uncovertebral joint hypertrophy and facet joint DJD. C6-C7: Mild DDD. Disc osteophyte complex measures 2 mm. Mild spinal canal stenosis. Moderate left and mild right neural foraminal stenosis due to uncovertebral joint hypertrophy and facet joint DJD. C7-T1: There is no significant disc protrusion, spinal canal stenosis or neural foraminal narrowing. 1. Motion degraded examination. 2. Diffuse congenital spinal canal stenosis. Superimposed degenerative changes further narrow the spinal canal. 3. Multilevel moderate and severe bilateral neural foraminal stenosis, as described above. Xr Chest Portable    Result Date: 8/25/2019  EXAMINATION: ONE XRAY VIEW OF THE CHEST 8/25/2019 10:41 am COMPARISON: 08/23/2019 HISTORY: ORDERING SYSTEM PROVIDED HISTORY: SOB TECHNOLOGIST PROVIDED HISTORY: Reason for exam:->SOB Reason for Exam: sob Acuity: Acute Type of Exam: Initial Additional signs and symptoms: sob Relevant Medical/Surgical History: sob FINDINGS: Hypoinflated lungs. Heart size is enlarged, but stable. Thoracic aortic atherosclerotic disease. Right middle lobe and left basilar opacities, new from prior study. No pneumothorax. Right middle lobe and left basilar opacities, new from prior study, may represent multifocal pneumonia. Superimposed left pleural effusion possible as well. Xr Chest Portable    Result Date: 8/23/2019  EXAMINATION: ONE XRAY VIEW OF THE CHEST 8/23/2019 1:20 am COMPARISON: Chest 08/21/2019 HISTORY: ORDERING SYSTEM PROVIDED HISTORY: hypoxia, SOB TECHNOLOGIST PROVIDED HISTORY: Reason for exam:->hypoxia, SOB Reason for Exam: sob Acuity: Acute Type of Exam: Initial Additional signs and symptoms: sob Relevant Medical/Surgical History: sob FINDINGS: The cardiac silhouette is enlarged.   Calcifications involving the aorta reflect atherosclerosis. The mediastinal and hilar silhouettes appear unremarkable. Vascular engorgement and cephalization is demonstrated with bilateral peribronchial cuffing and perivascular haziness. Evidence of small volume left pleural effusion and left lateral basilar atelectasis or infiltrate. No pneumothorax is seen. No acute osseous abnormality is identified. 1. Congestive heart failure probably accounts for radiographic findings. 2. Calcific atherosclerosis aorta. 3. Cardiomegaly. Vl Dup Carotid Bilateral    Result Date: 8/20/2019  EXAMINATION: ULTRASOUND EVALUATION OF THE CAROTID ARTERIES 8/19/2019 COMPARISON: 02/14/2018 HISTORY: ORDERING SYSTEM PROVIDED HISTORY: left arm weakness TECHNOLOGIST PROVIDED HISTORY: Reason for exam:->left arm weakness Reason for Exam: left arm weakness Acuity: Acute FINDINGS: RIGHT: The right common carotid artery demonstrates peak systolic velocities of 99 and 122 cm/sec in the proximal and distal segments respectively. The right internal carotid artery demonstrates the systolic velocities of 554, 277, and 222 cm/sec in the proximal, mid and distal segments respectively. The external carotid artery is patent with markedly elevated velocity of 370 centimeters/second. No diastolic flow identified. The vertebral artery demonstrates normal antegrade flow. Mild-to-moderate amount of focal atherosclerotic plaque. ICA/CCA ratio of 2.8. LEFT: The left common carotid artery demonstrates peak systolic velocities of 998 and 311 cm/sec in the proximal and distal segments respectively. No flow demonstrated in the left internal carotid artery. The external carotid artery is patent with elevated velocity of 320 centimeters/second. Mild diastolic flow demonstrated. The vertebral artery is not demonstrated. Moderate hyperechoic focal atherosclerotic plaque. The right internal carotid artery demonstrates 50-69% stenosis increased from the prior study.  The left internal carotid artery degraded by patient motion artifact. 2. No acute intracranial abnormality. Specifically, no acute infarction. 3. Diffuse parenchymal volume loss and moderate to severe chronic microvascular ischemic changes. 4. Left frontal lobe encephalomalacia. Us Retroperitoneal Limited    Result Date: 8/22/2019  EXAMINATION: ULTRASOUND OF THE KIDNEYS 8/20/2019 9:49 am COMPARISON: PET CT 04/09/2018. HISTORY: ORDERING SYSTEM PROVIDED HISTORY: rosalie TECHNOLOGIST PROVIDED HISTORY: Reason for Exam: rosalie Acuity: Acute FINDINGS: The right kidney measures 11.5 cm in length and the left kidney measures 10.4 cm in length. Kidneys demonstrate normal cortical echogenicity and thickness. Bilateral renal cysts are noted, measuring up to 2.3 cm on the right and 1.7 cm on the left. No hydronephrosis or intrarenal stones. No focal lesions. No acute renal abnormality. No hydronephrosis.        Current Treatment Team:  Treatment Team: Attending Provider: Kailash Quintero MD; Registered Nurse: Sugar Go RN; Consulting Physician: MD Kailash Wan Md, MS [facility]  8/25/2019 4:57 PM      Electronically signed by Kailash Quintero MD on 8/25/2019 at 4:57 PM

## 2019-08-25 NOTE — PROGRESS NOTES
RT called due to patient desaturation in the low 80's. Patient placed on bipap mask on the following settings:  IPAP 12, EPAP 6 and Fi02 45%. Sp02 now 96-98% on bipap mask. HR 71 and RR 16 bpm.  Patient relates he is comfortable on mask. AHSAN Desai aware and Edmond NP coming down to see patient. RT will follow as pt tolerates.

## 2019-08-25 NOTE — PLAN OF CARE
Patient educated on the following at this time, patient verbalized understanding with teach back. All questions/concerns addressed, will continue to educate.        Problem: Falls - Risk of:  Goal: Will remain free from falls  Description  Will remain free from falls  8/19/2019 1159 by Nirav Ferrer RN  Outcome: Ongoing  8/19/2019 1148 by Nirav Ferrer RN  Outcome: Ongoing  8/18/2019 2220 by Bing Beal RN  Outcome: Ongoing  Goal: Absence of physical injury  Description  Absence of physical injury  8/19/2019 1159 by Nirav Ferrer RN  Outcome: Ongoing  8/19/2019 1148 by Nirav Ferrer RN  Outcome: Ongoing  8/18/2019 2220 by Bing Beal RN  Outcome: Ongoing     Problem: Breathing Pattern - Ineffective:  Goal: Ability to achieve and maintain a regular respiratory rate will improve  Description  Ability to achieve and maintain a regular respiratory rate will improve  8/19/2019 1159 by Nirav Ferrer RN  Outcome: Ongoing  8/19/2019 1148 by Nirav Ferrer RN  Outcome: Ongoing  8/18/2019 2220 by Bing Beal RN  Outcome: Ongoing     Problem: OXYGENATION/RESPIRATORY FUNCTION  Goal: Patient will maintain patent airway  Outcome: Ongoing  Goal: Patient will achieve/maintain normal respiratory rate/effort  Description  Respiratory rate and effort will be within normal limits for the patient  Outcome: Ongoing     Problem: HEMODYNAMIC STATUS  Goal: Patient has stable vital signs and fluid balance  Outcome: Ongoing     Problem: FLUID AND ELECTROLYTE IMBALANCE  Goal: Fluid and electrolyte balance are achieved/maintained  Outcome: Ongoing     Problem: ACTIVITY INTOLERANCE/IMPAIRED MOBILITY  Goal: Mobility/activity is maintained at optimum level for patient  Outcome: Ongoing     Problem: Gas Exchange - Impaired:  Goal: Levels of oxygenation will improve  Description  Levels of oxygenation will improve  Outcome: Ongoing     Problem: Pain:  Goal: Pain level will
Problem: Falls - Risk of:  Goal: Will remain free from falls  Description  Will remain free from falls  Outcome: Ongoing  Goal: Absence of physical injury  Description  Absence of physical injury  Outcome: Ongoing     Problem: Breathing Pattern - Ineffective:  Goal: Ability to achieve and maintain a regular respiratory rate will improve  Description  Ability to achieve and maintain a regular respiratory rate will improve  Outcome: Ongoing     Problem: OXYGENATION/RESPIRATORY FUNCTION  Goal: Patient will maintain patent airway  Outcome: Ongoing  Goal: Patient will achieve/maintain normal respiratory rate/effort  Description  Respiratory rate and effort will be within normal limits for the patient  Outcome: Ongoing     Problem: HEMODYNAMIC STATUS  Goal: Patient has stable vital signs and fluid balance  Outcome: Ongoing     Problem: FLUID AND ELECTROLYTE IMBALANCE  Goal: Fluid and electrolyte balance are achieved/maintained  Outcome: Ongoing     Problem: ACTIVITY INTOLERANCE/IMPAIRED MOBILITY  Goal: Mobility/activity is maintained at optimum level for patient  Outcome: Ongoing     Problem: Gas Exchange - Impaired:  Goal: Levels of oxygenation will improve  Description  Levels of oxygenation will improve  Outcome: Ongoing     Problem: Pain:  Goal: Pain level will decrease  Description  Pain level will decrease  Outcome: Ongoing  Goal: Control of acute pain  Description  Control of acute pain  Outcome: Ongoing  Goal: Control of chronic pain  Description  Control of chronic pain  Outcome: Ongoing     Problem: Discharge Planning:  Goal: Discharged to appropriate level of care  Description  Discharged to appropriate level of care  Outcome: Ongoing
Exchange - Impaired:  Goal: Levels of oxygenation will improve  Description  Levels of oxygenation will improve  8/24/2019 2345 by Favian Rich RN  Outcome: Ongoing  8/24/2019 1419 by Rachell Castellanos RN  Outcome: Ongoing     Problem: Pain:  Goal: Pain level will decrease  Description  Pain level will decrease  8/24/2019 2345 by Favian Rich RN  Outcome: Ongoing  8/24/2019 1419 by Rachell Castellanos RN  Outcome: Met This Shift  Goal: Control of acute pain  Description  Control of acute pain  8/24/2019 2345 by Favian Rich RN  Outcome: Ongoing  8/24/2019 1419 by Rachell Castellanos RN  Outcome: Met This Shift  Goal: Control of chronic pain  Description  Control of chronic pain  8/24/2019 2345 by Favian Rich RN  Outcome: Ongoing  8/24/2019 1419 by Rachell Castellanos RN  Outcome: Met This Shift     Problem: Discharge Planning:  Goal: Discharged to appropriate level of care  Description  Discharged to appropriate level of care  8/24/2019 2345 by Favian Rich RN  Outcome: Ongoing  8/24/2019 1419 by Rachell Castellanos RN  Outcome: Ongoing

## 2019-08-26 NOTE — CONSULTS
and shortness of breath. Negative for chest tightness. Cardiovascular: Positive for leg swelling. Negative for chest pain and palpitations. Gastrointestinal: Negative for abdominal distention, abdominal pain, blood in stool, constipation, diarrhea, nausea and vomiting. Genitourinary: Negative for difficulty urinating and dysuria. Musculoskeletal: Positive for arthralgias and gait problem. Negative for back pain. Skin: Negative for color change, pallor, rash and wound. Neurological: Negative for dizziness, syncope and light-headedness. Psychiatric/Behavioral: Negative for agitation and confusion. The patient is not nervous/anxious. Physical Examination:    BP (!) 102/56   Pulse 97   Temp 98.1 °F (36.7 °C) (Oral)   Resp 22   Wt 216 lb 7.9 oz (98.2 kg)   SpO2 94%   BMI 33.91 kg/m²    Wt Readings from Last 3 Encounters:   08/26/19 216 lb 7.9 oz (98.2 kg)   08/23/19 218 lb 9.6 oz (99.2 kg)   07/09/19 232 lb (105.2 kg)     Body mass index is 33.91 kg/m². Physical Exam   Constitutional: He is oriented to person, place, and time. He appears well-developed and well-nourished. HENT:   Head: Normocephalic and atraumatic. Patient had cancer of the nose. Small hole noted to the left side of nose   Eyes: Pupils are equal, round, and reactive to light. Conjunctivae are normal. Right eye exhibits no discharge. Left eye exhibits no discharge. Neck: Normal range of motion. Neck supple. No JVD present. No thyromegaly present. Cardiovascular: Exam reveals no gallop and no friction rub. No murmur heard. Pulmonary/Chest: He is in respiratory distress. He has rales. Tachypnea  Accessory muscle usage noted  On vapotherm   Abdominal: Soft. Bowel sounds are normal. He exhibits no distension. There is no tenderness. Musculoskeletal: He exhibits no edema, tenderness or deformity. Neurological: He is alert and oriented to person, place, and time. Skin: Skin is warm and dry. No rash noted.

## 2019-08-26 NOTE — CONSULTS
Pulmonary Consult Note      Reason for Consult: Hypoxic resp failure  Requesting Physician:    Subjective:   CHIEF COMPLAINT :SOB    Patient Active Problem List    Diagnosis Date Noted    Acute on chronic respiratory failure with hypoxia and hypercapnia (HCC) 2019    Acute respiratory failure ([facility] 2019    Acute on chronic diastolic congestive heart failure ([address] 2019    Hypertension 2019    Congestive heart failure due to high blood pressure ([address] 2019    Femoral artery occlusion (HCC) 2019    PVD (peripheral vascular disease) ([facility] 06/10/2019    Arthritis of both knees 05/10/2018    Abdominal aortic aneurysm (AAA) without rupture ([facility] 2018     Overview Note:     Abdominal aortic aneurysm 4.2 cm incidental finding on PET scan  Seen Dr. Adilson Garcia. Follow-up in one year per his recommendations      Squamous cell carcinoma of nose 2018     Overview Note:     3/2018 : scc of the nose   Seen Dr Estefania Estes. Had surgery and then radiation to the area ,  Seen Dr Sam Crawford : no chemo given.  Right knee pain 2018     Overview Note:     Patient has pain in the right knee for few months  Has seen Dr Chico Freed and is going to have arthroscopic surgery  [facility] nurse state they have all information and anesthesiologist approved it. EKG showed no acute changes looks normal. Done at [facility].  Pt denies any chest pain. No SOB. No h/o CHF      Renal insufficiency 2017     Overview Note:     Cr 1.7 but improved to 1.2 in , cr 1.0 in   resolved      Restrictive lung disease 2016     Overview Note:     Patient denies any shortness of breath. No chest pain. No cough. No edema.  Left-sided carotid artery disease ([facility]): R  2015     Overview Note:     Complete occlusion of left. R carotid endarterectomy  Carotid doppler . R  Is patent.   Carotid doppler 6/15  R is patent  Carotid doppler 10/16 R is 0-50%  Left is completely occluded,referred to Dr. Shawn Reid. Recheck in one year      Hyperglycemia 11/19/2014     Overview Note:     A1c 6.1, 6.2      Essential hypertension 08/15/2013     Overview Note:     Patient has hypertension and is on medications  He is taking verapamil 240 mg daily, losartan 100 mg daily, metoprolol 50 mg twice a day      Mixed hyperlipidemia 08/15/2013     Overview Note:     Patient has hyperlipidemia and is taking lovastatin 40 mg daily            HPI:                The patient is a 80 y.o. male with significant past medical history of CKD, COPD, Obesity, chronic resp failure, HTN, HLD, COPD, 60 pk yr smoking quit in 2016, squamous cell ca of the nose  presents with complaints of SOB. He has been transferred from [facility] where he was admitted for the SOB, he had diuresed about 7 Litres. He was being treated with the  abx and steroids and nebs. He was also on BIPAP. He was found to have multifocal pneumonia. His cultures have been negative and his Resp PCR is  Negative. He had no sick exposure, no recent travel, no headaches, no n/v, no abdominal pain, no diarrhea, no dysuria. At this time he is lying in the bed. He is not in acute resp distress. Past Medical History:      Diagnosis Date    Acute on chronic diastolic congestive heart failure ([facility] 8/17/2019    Anemia     \"use to be on iron tablets\"    Arthritis     Carotid artery disease (HCC)     complete occlusion left ICA, status post right carotid endarterectomy    Cellulitis of right lower extremity 7/1/2016    Was admitted in [address] [place] [address] in 7/16    Chronic cough 12/18/2015    Chest x-ray showed mild pulmonary congestion that is chronic. And some atelectasis PFT showed no obstructive disease. Has restrictive lung disease.     CKD (chronic kidney disease)     per hx from [facility] has listed as CKD stage 3 but pt is not aware of this dx    COPD (chronic obstructive pulmonary disease) ([facility]     H/O (Last 24 hours) at 2019 1059  Last data filed at 2019 0935  Gross per 24 hour   Intake 480 ml   Output 1000 ml   Net -520 ml     CURRENT PULSE OXIMETRY:  SpO2: 91 %  24HR PULSE OXIMETRY RANGE:  SpO2  Av.4 %  Min: 77 %  Max: 100 %    CONSTITUTIONAL:  awake, alert, cooperative, no apparent distress, and appears stated age  NECK:  Supple, symmetrical, trachea midline, no adenopathy, thyroid symmetric, not enlarged and no tenderness, skin normal  LUNGS: Occasional basal crackles  CARDIOVASCULAR:  normal S1 and S2, no edema and no JVD  ABDOMEN:  normal bowel sounds, non-distended and no masses palpated, and no tenderness to palpation. No hepatospleenomegaly  LYMPHADENOPATHY:  no axillary or supraclavicular adenopathy. No cervical adnenopathy  PSYCHIATRIC: Oriented to person place and time. No obvious depression or anxiety. MUSCULOSKELETAL: No obvious misalignment or effusion of the joints. No clubbing, cyanosis of the digits. SKIN:  normal skin color, texture, turgor and no redness, warmth, or swelling.  No palpable nodules    DATA:    Old records have been reviewed  CBC with Differential:    Lab Results   Component Value Date    WBC 16.3 2019    RBC 3.67 2019    HGB 10.3 2019    HCT 34.7 2019     2019    MCV 94.6 2019    MCH 28.1 2019    MCHC 29.7 2019    RDW 13.8 2019    SEGSPCT 77.3 2019    LYMPHOPCT 10.0 2019    MONOPCT 7.7 2019    EOSPCT 3.9 10/12/2018    BASOPCT 0.2 2019    MONOSABS 1.3 2019    LYMPHSABS 1.6 2019    EOSABS 0.5 2019    BASOSABS 0.0 2019    DIFFTYPE AUTOMATED DIFFERENTIAL 2019     BMP:    Lab Results   Component Value Date     2019    K 3.7 2019    CL 89 2019    CO2 39 2019    BUN 57 2019    CREATININE 1.7 2019    CALCIUM 9.1 2019    GFRAA 47 2019    LABGLOM 39 2019    LABGLOM 63 2014    GLUCOSE 163

## 2019-08-26 NOTE — CONSULTS
10/12/2018     PT/INR:    Lab Results   Component Value Date    PROTIME 11.3 2019    INR 0.99 2019     HgBA1c:    Lab Results   Component Value Date    LABA1C 6.2 2018         Assessment:     Patient Active Problem List   Diagnosis    Essential hypertension    Mixed hyperlipidemia    Hyperglycemia    Left-sided carotid artery disease ([facility]): R     Restrictive lung disease    Renal insufficiency    Right knee pain    Squamous cell carcinoma of nose    Abdominal aortic aneurysm (AAA) without rupture (HCC)    Arthritis of both knees    PVD (peripheral vascular disease) ([facility] [facility]    Femoral artery occlusion (HCC)    Acute on chronic diastolic congestive heart failure (HCC)    Hypertension    Congestive heart failure due to high blood pressure (HCC)    Acute on chronic respiratory failure with hypoxia and hypercapnia (HCC)    Acute respiratory failure (HCC)    Gluteal cleft wound, right, initial encounter       Measurements:  Wound 16 Right medial ankle wound (Active)   Number of days: 1147       Wound 19 Buttocks Right (Active)   Wound Image   2019  2:06 PM   Wound Pressure Stage  2 2019  8:00 AM   Dressing Status Clean;Dry; Intact 2019  8:02 AM   Dressing Changed Changed/New 2019  8:02 AM   Dressing/Treatment Calmoseptine 2019  3:53 PM   Wound Length (cm) 0.4 cm 2019  2:06 PM   Wound Width (cm) 0.3 cm 2019  2:06 PM   Wound Depth (cm) 0.1 cm 2019  2:06 PM   Wound Surface Area (cm^2) 0.12 cm^2 2019  2:06 PM   Wound Volume (cm^3) 0.01 cm^3 2019  2:06 PM   Tunneling Position ___ O'Clock 0 2019  2:06 PM   Undermining Starts ___ O'Clock 0 2019  2:06 PM   Undermining Ends___ O'Clock 0 2019  2:06 PM   Undermining Maxium Distance (cm) 0 2019  2:06 PM   Wound Assessment Pink;Clean;Dry; Intact 2019 10:57 AM   Drainage Amount None 2019 10:57 AM   Odor None 2019 10:57 AM   Margins Defined edges 2019

## 2019-08-26 NOTE — PROGRESS NOTES
deferred.  No costovertebral angle tenderness. Normal appearing external genitalia. Doe catheter is not present. HEME/LYMPH No palpable cervical lymphadenopathy and no hepatosplenomegaly. No petechiae or ecchymoses. MSK No gross joint deformities. SKIN Normal coloration, warm, dry. NEURO Cranial nerves appear grossly intact, normal speech, no lateralizing weakness. PSYCH Awake, alert, oriented x 4. Affect appropriate. Medications:   Medications:    acetaZOLAMIDE  250 mg Intravenous Q6H    levofloxacin  500 mg Oral Daily    enoxaparin  30 mg Subcutaneous Daily    sodium chloride flush  10 mL Intravenous 2 times per day    acetylcysteine  600 mg Inhalation BID    aspirin  81 mg Oral Daily    atorvastatin  10 mg Oral Daily    cilostazol  50 mg Oral BID    furosemide  40 mg Oral BID    ipratropium-albuterol  1 vial Inhalation 4x Daily    metOLazone  2.5 mg Oral Daily    metoprolol succinate  25 mg Oral BID    omega-3 acid ethyl esters  2 g Oral BID    potassium chloride  20 mEq Oral Daily    verapamil  240 mg Oral Daily      Infusions:   PRN Meds:   nitroGLYCERIN 0.4 mg Q5 Min PRN   magnesium hydroxide 30 mL Daily PRN   ondansetron 4 mg Q6H PRN   sodium chloride flush 10 mL PRN   acetaminophen 650 mg Q8H PRN   albuterol 1.25 mg Q6H PRN   albuterol sulfate HFA 1 puff Q6H PRN   diphenhydrAMINE 25 mg Q6H PRN   guaiFENesin-dextromethorphan 5 mL Q4H PRN       CBC   Recent Labs     08/24/19  0530 08/25/19  0450 08/26/19  0319   WBC 15.9* 17.2* 16.3*   HGB 9.9* 10.4* 10.3*   HCT 33.1* 35.1* 34.7*    304 294      BMP Recent Labs     08/24/19  0530 08/25/19  0450 08/26/19  0319    142 140   K 3.7 3.8 3.7   CL 92* 92* 89*   CO2 30 32 39*   BUN 62* 56* 57*   CREATININE 1.7* 1.6* 1.7*       Radiology report reviewed: None    Radiology and ECG personally reviewed:   Telemetry - A fib.        Electronically signed by Orlando Jimenes MD on 8/26/2019 at 12:09 PM

## 2019-08-27 NOTE — PROGRESS NOTES
Admit Date:  8/25/2019    Admission diagnosis / Complaint: shortness of breath      Subjective:  Mr. Kaycee Gibson denies complaints of palpitations. On bipap    Objective:   BP (!) 100/54   Pulse 96   Temp 98.4 °F (36.9 °C) (Axillary)   Resp 20   Ht 5' 7.01\" (1.702 m)   Wt 216 lb 7.9 oz (98.2 kg)   SpO2 96%   BMI 33.90 kg/m²       Intake/Output Summary (Last 24 hours) at 8/27/2019 1646  Last data filed at 8/27/2019 1500  Gross per 24 hour   Intake 10 ml   Output 1975 ml   Net -1965 ml       TELEMETRY: Sinus with PAC's   has a past medical history of Acute on chronic diastolic congestive heart failure ([address] Anemia, Arthritis, Carotid artery disease (HCC), Cellulitis of right lower extremity, Chronic cough, CKD (chronic kidney disease), COPD (chronic obstructive pulmonary disease) ([address] H/O colonoscopy, H/O Doppler ultrasound, [place] (hard of hearing), HTN (hypertension), Hyperglycemia, Hyperlipidemia, Left-sided carotid artery disease ([address] On home oxygen therapy, PVD (peripheral vascular disease) ([address] Squamous cell carcinoma of nose, Tobacco abuse disorder, Ulcer of right foot with fat layer exposed ([address] and Wears glasses. has a past surgical history that includes Carotid endarterectomy (01/2004); Colonoscopy (?over 10 yrs ago); skin biopsy; other surgical history (06/10/2019); and FEMORAL ENDARTERECTOMY (Right, 6/20/2019). Physical Exam:  General:  Awake, alert, NAD  Skin:  Warm and dry  Neck:  JVD not noted  Chest:  Bilateral wheezing noted, on bipap  Cardiovascular:  RRR S1S2  Abdomen:  Soft nontender  Extremities:  trace edema, bilateral lower leg vascular rivera noted.     Medications:    midodrine  5 mg Oral TID WC    levofloxacin  500 mg Oral Daily    enoxaparin  30 mg Subcutaneous Daily    sodium chloride flush  10 mL Intravenous 2 times per day    acetylcysteine  600 mg Inhalation BID    aspirin  81 mg Oral Daily    atorvastatin  10 mg Oral Daily    cilostazol  50 mg Oral BID   

## 2019-08-27 NOTE — PROGRESS NOTES
2 times per day    acetylcysteine  600 mg Inhalation BID    aspirin  81 mg Oral Daily    atorvastatin  10 mg Oral Daily    cilostazol  50 mg Oral BID    furosemide  40 mg Oral BID    ipratropium-albuterol  1 vial Inhalation 4x Daily    metOLazone  2.5 mg Oral Daily    metoprolol succinate  25 mg Oral BID    omega-3 acid ethyl esters  2 g Oral BID    potassium chloride  20 mEq Oral Daily    verapamil  240 mg Oral Daily      Infusions:   PRN Meds:   nitroGLYCERIN 0.4 mg Q5 Min PRN   magnesium hydroxide 30 mL Daily PRN   ondansetron 4 mg Q6H PRN   sodium chloride flush 10 mL PRN   acetaminophen 650 mg Q8H PRN   albuterol 1.25 mg Q6H PRN   albuterol sulfate HFA 1 puff Q6H PRN   diphenhydrAMINE 25 mg Q6H PRN   guaiFENesin-dextromethorphan 5 mL Q4H PRN       Recent Labs     08/25/19  0450 08/26/19  0319 08/27/19  0409   WBC 17.2* 16.3* 15.8*   HGB 10.4* 10.3* 10.1*   HCT 35.1* 34.7* 34.2*    294 307      Recent Labs     08/25/19  0450 08/26/19  0319 08/27/19  0409    140 139   K 3.8 3.7 3.4*   CL 92* 89* 87*   CO2 32 39* 40*   BUN 56* 57* 58*   CREATININE 1.6* 1.7* 2.0*     Imaging reviewed    Electronically signed by Marly Jerome MD on 8/27/2019 at 11:37 AM

## 2019-08-27 NOTE — PROGRESS NOTES
Patient refused to turn for wound assessment during day shift until 18:30.  Wound assessed, cleaned with soap and water and covered with zinc protectant

## 2019-08-28 NOTE — PROGRESS NOTES
patent    On home oxygen therapy     2l/nc at night and prn    PVD (peripheral vascular disease) ([address]     hx per old chart    Squamous cell carcinoma of nose 2018    3/2018 : scc of the nose  Seen Dr Saleh Poag- tx with radiation tx     Tobacco abuse disorder 8/15/2013    60 pack year history. Quit in 2016    Ulcer of right foot with fat layer exposed ([address] 2016    per pt on \"all cleared up\"    Wears glasses        Objective:     Vitals:    19 0754   BP:    Pulse:    Resp: 17   Temp:    SpO2:        TEMPERATURE:  Current -Temp: 98.5 °F (36.9 °C); Max - Temp  Av.2 °F (36.8 °C)  Min: 97.5 °F (36.4 °C)  Max: 98.6 °F (37 °C)    No intake/output data recorded. I/O last 3 completed shifts:  In: -   Out:  [Urine:]      Physical Exam:  Physical Exam   Constitutional: He is oriented to person, place, and time. He appears well-nourished. No distress. HENT:   Head: Normocephalic. Eyes: EOM are normal. No scleral icterus. Neck: Neck supple. Pulmonary/Chest:   Currently on BiPAP     Abdominal: Soft. There is no tenderness. Genitourinary:   Genitourinary Comments: +hermosillo in place with dilute urine in bag     Musculoskeletal: Normal range of motion. Neurological: He is alert and oriented to person, place, and time. Skin: Skin is warm and dry. He is not diaphoretic. R gluteal wound unchanged. Still without palpable fluid collection or drainage. Nontender. Psychiatric: He has a normal mood and affect. Vitals reviewed.         Scheduled Meds:   midodrine  5 mg Oral TID WC    levofloxacin  500 mg Oral Daily    enoxaparin  30 mg Subcutaneous Daily    sodium chloride flush  10 mL Intravenous 2 times per day    acetylcysteine  600 mg Inhalation BID    aspirin  81 mg Oral Daily    atorvastatin  10 mg Oral Daily    cilostazol  50 mg Oral BID    ipratropium-albuterol  1 vial Inhalation 4x Daily    metoprolol succinate  25 mg Oral BID    omega-3 acid ethyl esters  2 g Oral BID    potassium chloride  20 mEq Oral Daily    verapamil  240 mg Oral Daily     ContinuousInfusions:  PRN Meds:nitroGLYCERIN, magnesium hydroxide, ondansetron, sodium chloride flush, acetaminophen, albuterol, albuterol sulfate HFA, diphenhydrAMINE, guaiFENesin-dextromethorphan      Labs/Imaging Results:   Lab Results   Component Value Date    WBC 14.7 (H) 08/28/2019    HGB 9.9 (L) 08/28/2019    HCT 33.1 (L) 08/28/2019    MCV 92.2 08/28/2019     08/28/2019     Lab Results   Component Value Date     08/28/2019    K 3.4 (L) 08/28/2019    CL 90 (L) 08/28/2019    CO2 37 (H) 08/28/2019    BUN 53 (H) 08/28/2019    CREATININE 2.0 (H) 08/28/2019    GLUCOSE 142 (H) 08/28/2019    CALCIUM 9.3 08/28/2019    PROT 6.9 05/14/2018    LABALBU 3.3 (L) 08/28/2019    BILITOT 0.6 10/12/2018    ALKPHOS 63 10/12/2018    AST 16 10/12/2018    ALT 10 10/12/2018    LABGLOM 32 (L) 08/28/2019    GFRAA 39 (L) 08/28/2019    AGRATIO 1.4 08/26/2014    GLOB 2.9 08/26/2014       Assessment:     79 y/o M admitted with increasing SOB, right gluteal wound    Plan:     -Continue local wound care  -Wound still without drainage and no palpable fluid collection   -Turn pt as able throughout day  -Continue medical mgt  -Will continue to follow   -Discussed plan with pt and pt's nurse at bedside.  -Call with any questions or concerns.        Electronically signed by Garrison Denton II, MD on 8/28/2019 at 8:38 AM

## 2019-08-28 NOTE — PROGRESS NOTES
GI Prophylaxis [] PPI,  [] H2 Blocker,  [] Carafate,  [x] Diet/Tube Feeds   Code Status Full Code   MDM [] Low, [x] Moderate,[]  High     History of Present Illness:     Chief Complaint: Acute respiratory failure ([address]    He was seen and examined today. Hypoxemic at 85% while eating. Still with SOB and cough. No chest pain. Denied fever or chills. Ten point ROS reviewed negative, unless as noted above    Objective: Intake/Output Summary (Last 24 hours) at 8/28/2019 1137  Last data filed at 8/28/2019 0945  Gross per 24 hour   Intake 120 ml   Output 1975 ml   Net -1855 ml      Vitals:   Vitals:    08/28/19 1132   BP: 112/64   Pulse: 80   Resp: 19   Temp: 98.5 °F (36.9 °C)   SpO2: 94%     Physical Exam:   GEN Awake male, sitting upright in bed in no apparent distress. Appears given age. EYES Pupils are equally round. No scleral erythema, discharge, or conjunctivitis. HENT Mucous membranes are moist. Oral pharynx without exudates, no evidence of thrush. Scar nasal area  NECK Supple, no apparent thyromegaly or masses. RESP decreased breath sounds. No wheezes. CARDIO/VASC S1/S2 auscultated. Regular rate without appreciable murmurs, rubs, or gallops. No JVD or carotid bruits. Peripheral pulses equal bilaterally and palpable. No peripheral edema. GI Abdomen is soft without significant tenderness, masses, or guarding. Bowel sounds are normoactive. Rectal exam deferred. MSK No gross joint deformities. SKIN Normal coloration, warm, dry. NEURO Cranial nerves appear grossly intact, normal speech, no lateralizing weakness. PSYCH Awake, alert, oriented x 4. Affect appropriate.     Medications:   Medications:    furosemide  20 mg Intravenous Daily    midodrine  5 mg Oral TID WC    levofloxacin  500 mg Oral Daily    enoxaparin  30 mg Subcutaneous Daily    sodium chloride flush  10 mL Intravenous 2 times per day    acetylcysteine  600 mg Inhalation BID    aspirin  81 mg Oral Daily    atorvastatin  10

## 2019-08-28 NOTE — PROGRESS NOTES
Pulmonary and Critical Care  Progress Note      VITALS:  /64   Pulse 80   Temp 98.5 °F (36.9 °C) (Axillary)   Resp 19   Ht 5' 7.01\" (1.702 m)   Wt 216 lb 7.9 oz (98.2 kg)   SpO2 94%   BMI 33.90 kg/m²     Subjective:   CHIEF COMPLAINT :SOB     HPI:                The patient is a 80 y.o. male with significant past medical history of CKD, COPD, Obesity, chronic resp failure, HTN, HLD, COPD, 60 pk yr smoking quit in 2016, squamous cell ca of the nose  presents with complaints of  SOB. He has been transferred from [facility] where he was admitted for the SOB, he had diuresed about 7 Litres. He was being treated with the  abx and steroids and nebs. He was also on BIPAP. He was found to have multifocal pneumonia. His cultures have been negative and his Resp PCR is  Negative. He had no sick exposure, no recent travel, no headaches, no n/v, no abdominal pain, no diarrhea, no dysuria. At this time he is lying in the bed. He is not in acute resp distress. Objective:   PHYSICAL EXAM:    LUNGS:Decreased air entry bilateral bases  Abd-soft, BS+, NT  Ext - no pedal edema  CVS-s1s2, no murmurs      DATA:    CBC:  Recent Labs     08/26/19 0319 08/27/19 0409 08/28/19 0338   WBC 16.3* 15.8* 14.7*   RBC 3.67* 3.68* 3.59*   HGB 10.3* 10.1* 9.9*   HCT 34.7* 34.2* 33.1*    307 288   MCV 94.6 92.9 92.2   MCH 28.1 27.4 27.6   MCHC 29.7* 29.5* 29.9*   RDW 13.8 13.6 13.7   SEGSPCT 77.3* 77.1* 76.8*      BMP:  Recent Labs     08/26/19 0319 08/27/19 0409 08/28/19 0338    139 139   K 3.7 3.4* 3.4*   CL 89* 87* 90*   CO2 39* 40* 37*   BUN 57* 58* 53*   CREATININE 1.7* 2.0* 2.0*   CALCIUM 9.1 9.3 9.3   GLUCOSE 163* 142* 142*      ABG:  Recent Labs     08/27/19  0900   PH 7.43   PO2ART 50*   [id number] 66.0*   O2SAT 85.9*     BNP  No results found for: BNP   D-Dimer:  No results found for: DDIMER   1.  Radiology: Reviewed      Assessment/Plan     Patient Active Problem List    Diagnosis Date Noted    11/19/2014     Overview Note:     A1c 6.1, 6.2      Essential hypertension 08/15/2013     Overview Note:     Patient has hypertension and is on medications  He is taking verapamil 240 mg daily, losartan 100 mg daily, metoprolol 50 mg twice a day      Mixed hyperlipidemia 08/15/2013     Overview Note:     Patient has hyperlipidemia and is taking lovastatin 40 mg daily       Hypokalemia  Leukocytosis- improving  DARY on CKD- stable  Acute on chronic hypoxic hypercapneic resp failure  Chronic metabolic alkalosis  Morbid obesity  Multifocal pneumonia  Small left pleural effusion       1. Change to PO abx  2. C/w nebs  3. Keep sats > 92%  4. C/w present management  5. Await placement  No follow-ups on file.     Electronically signed by Jossy Rivero MD on 8/28/2019 at 12:27 PM

## 2019-08-29 NOTE — PROGRESS NOTES
able  3 maintain with diuretic and worsen renal with cardiorenal and will follow  4 replte K  5 maintain abx  Will follow           Electronically signed by Joe Cerna MD on 8/29/2019 at 2:25 PM

## 2019-08-29 NOTE — PROGRESS NOTES
Angie. Recheck in one year      Hyperglycemia 11/19/2014     Overview Note:     A1c 6.1, 6.2      Essential hypertension 08/15/2013     Overview Note:     Patient has hypertension and is on medications  He is taking verapamil 240 mg daily, losartan 100 mg daily, metoprolol 50 mg twice a day      Mixed hyperlipidemia 08/15/2013     Overview Note:     Patient has hyperlipidemia and is taking lovastatin 40 mg daily       Leukocytosis- improving  DARY on CKD- stable  Acute on chronic hypoxic hypercapneic resp failure  Chronic metabolic alkalosis  Morbid obesity  Multifocal pneumonia  Small left pleural effusion       1. PO Abx  2. BIPAP qhs and PRN, Arrange for home BIPAP  3. C/w nebs  4. C/w present management  5. Await placement  No follow-ups on file.     Electronically signed by Phil Walker MD on 8/29/2019 at 12:23 PM

## 2019-08-29 NOTE — CARE COORDINATION
Phoned and spoke with Haydee Holly at [facility] . CM let her know that patient will need Bipap and SNF on discharge . She voiced understanding and stated that she will order the [place]. CORTEZ also let Haydee Holly know not sure if patient will be able to work with PT/OT for [facility]. She stated if not able to work with therapy patient could come to SNF with [facility] on his Medicaid.

## 2019-08-29 NOTE — PROGRESS NOTES
doppler 6/15  R is patent    On home oxygen therapy     2l/nc at night and prn    PVD (peripheral vascular disease) (HCC)     hx per old chart    Squamous cell carcinoma of nose 2018    3/2018 : scc of the nose  Seen Dr Estefania Estes- tx with radiation tx     Tobacco abuse disorder 8/15/2013    60 pack year history. Quit in     Ulcer of right foot with fat layer exposed ([address] 2016    per pt on \"all cleared up\"    Wears glasses        Objective:     Vitals:    19 0935   BP:    Pulse: 86   Resp: 20   Temp: 97.5 °F (36.4 °C)   SpO2: (!) 85%       TEMPERATURE:  Current -Temp: 97.5 °F (36.4 °C); Max - Temp  Av °F (36.7 °C)  Min: 96.5 °F (35.8 °C)  Max: 99.2 °F (37.3 °C)    No intake/output data recorded. I/O last 3 completed shifts: In: 240 [P.O.:240]  Out: 1350 [Urine:1350]      Physical Exam:  Physical Exam   Constitutional: He is oriented to person, place, and time. He appears well-nourished. No distress. HENT:   Head: Normocephalic. Eyes: EOM are normal. No scleral icterus. Neck: Neck supple. Pulmonary/Chest:   Currently on O2 via NC       Abdominal: Soft. There is no tenderness. Genitourinary:   Genitourinary Comments: +hermosillo in place with dilute urine in bag     Musculoskeletal: Normal range of motion. Neurological: He is alert and oriented to person, place, and time. Skin: Skin is warm and dry. He is not diaphoretic. R gluteal wound unchanged. Still without palpable fluid collection or drainage. Nontender. Psychiatric: He has a normal mood and affect. Vitals reviewed.         Scheduled Meds:   miconazole   Topical BID    furosemide  20 mg Intravenous Daily    levofloxacin  250 mg Oral Daily    midodrine  5 mg Oral TID WC    enoxaparin  30 mg Subcutaneous Daily    sodium chloride flush  10 mL Intravenous 2 times per day    acetylcysteine  600 mg Inhalation BID    aspirin  81 mg Oral Daily    atorvastatin  10 mg Oral Daily    cilostazol  50 mg Oral

## 2019-08-29 NOTE — PROGRESS NOTES
Hospitalist Progress Note      Name:  Sina Reyes /Age/Sex: 1934  (80 y.o. male)   MRN & CSN:  [id number] & [id number] Admission Date/Time: 2019  3:28 PM   Location:  -A PCP: Triny Cabral MD         Hospital Day: 5    Assessment and Plan:   Sina Reyes is a 80 y.o.  male  who presents with Acute respiratory failure ([address]    1. Acute on chronic hypoxic and Hypercapneic Respiratory Failure: Could be from heart failure, COPD, possible pulmonary embolism. Requiring continuous BiPAP. Has hypoxemia even on NC. Needs to have BiPAP on discharge. Chest x-ray yesterday with bibasilar atelectasis and small effusion. 2. Congestive heart failure, acute, diastolic: Echocardiogram with ejection fraction of 55 to 50%. . Continue beta-blocker. Cardiology following. Echocardiogram pending  3. Paroxysmal atrial fibrillation  versus multifocal atrial tachycardia: currently on beta-blocker. Not on anticoagulation. Electrophysiology following. 4. Acute kidney injury on chronic kidney disease stage III: Creatinine 2.2 today. Baseline at 1.2-1.3. Lasix restarted yesterday because of shortness of breath. Nephrology consulted. 5. Hypokalemia: Replace accordingly  6. Chronic metabolic alkalosis: Could be from respiratory acidosis due to COPD and possible obstructive sleep apnea. Given acetazolamide once. 7. Ulcer, coccyx: Present on admission. CT of the pelvis with no abscess. Surgery on consult. Recommended local wound care. Wound team following. 8. Probable acute bronchitis: Currently on Levaquin D4 to complete 5 days of therapy. 9. Peripheral artery disease: Continue aspirin and cilostazol. Continue Lipitor. 10. Squamous cell carcinoma of the nose: Status post resection  11. History of CVA: Recent work-up including MRI was negative. 12. Hypertension: Blood pressure controlled. On calcium channel blocker.   13. Morbid obesity    CODE STATUS switch to DNR comfort

## 2019-08-29 NOTE — PROGRESS NOTES
Admit Date:  8/25/2019    Admission diagnosis / Complaint: shortness of breath      Subjective:  Mr. Demetra Aquino continues to denies complaints of palpitations. Reports shortness of breath has improved. Denies chest pain, edema, dizziness, or syncope. Objective:   /60   Pulse 90   Temp 97.6 °F (36.4 °C) (Oral)   Resp 16   Ht 5' 7.01\" (1.702 m)   Wt 207 lb 10.8 oz (94.2 kg)   SpO2 93%   BMI 32.52 kg/m²       Intake/Output Summary (Last 24 hours) at 8/29/2019 1435  Last data filed at 8/29/2019 1219  Gross per 24 hour   Intake --   Output 1050 ml   Net -1050 ml       TELEMETRY: Sinus with PAC's   has a past medical history of Acute on chronic diastolic congestive heart failure ([address] Anemia, Arthritis, Carotid artery disease (HCC), Cellulitis of right lower extremity, Chronic cough, CKD (chronic kidney disease), COPD (chronic obstructive pulmonary disease) ([address] H/O colonoscopy, H/O Doppler ultrasound, [place] (hard of hearing), HTN (hypertension), Hyperglycemia, Hyperlipidemia, Left-sided carotid artery disease ([address] On home oxygen therapy, PVD (peripheral vascular disease) ([address] Squamous cell carcinoma of nose, Tobacco abuse disorder, Ulcer of right foot with fat layer exposed ([address] and Wears glasses. has a past surgical history that includes Carotid endarterectomy (01/2004); Colonoscopy (?over 10 yrs ago); skin biopsy; other surgical history (06/10/2019); and FEMORAL ENDARTERECTOMY (Right, 6/20/2019). Physical Exam:  General:  Awake, alert, NAD  Skin:  Warm and dry  Neck:  JVD not noted  Chest:  Diminished breath sounds, on bipap  Cardiovascular:  RRR S1S2, no mumurs appreciated  Abdomen:  Soft nontender  Extremities:  trace edema, bilateral lower leg vascular rivera noted.     Medications:    miconazole   Topical BID    [START ON 8/30/2019] potassium chloride  40 mEq Oral Daily    furosemide  20 mg Intravenous Daily    levofloxacin  250 mg Oral Daily    midodrine  5 mg Oral TID WC   

## 2019-08-30 NOTE — DISCHARGE INSTR - COC
1:51 AM   Dressing/Treatment Calmoseptine 8/30/2019  1:53 PM   Wound Length (cm) 0.4 cm 8/26/2019  3:00 PM   Wound Width (cm) 0.3 cm 8/26/2019  3:00 PM   Wound Depth (cm) 0.1 cm 8/26/2019  3:00 PM   Wound Surface Area (cm^2) 0.12 cm^2 8/26/2019  3:00 PM   Change in Wound Size % (l*w) 0 8/26/2019  3:00 PM   Wound Volume (cm^3) 0.01 cm^3 8/26/2019  3:00 PM   Wound Healing % 0 8/26/2019  3:00 PM   Distance Tunneling (cm) 0 cm 8/26/2019  3:00 PM   Tunneling Position ___ O'Clock 0 8/26/2019  3:00 PM   Undermining Starts ___ O'Clock 0 8/26/2019  3:00 PM   Undermining Ends___ O'Clock 0 8/26/2019  3:00 PM   Undermining Maxium Distance (cm) 0 8/26/2019  3:00 PM   Wound Assessment Red;Pink 8/30/2019  7:55 AM   Drainage Amount Small 8/30/2019  7:55 AM   Drainage Description Serosanguinous 8/30/2019  7:55 AM   Odor None 8/30/2019  7:55 AM   Margins Defined edges 8/30/2019  2:10 AM   [place]-wound Assessment Intact 8/30/2019  7:55 AM   Non-staged Wound Description Full thickness 8/30/2019  2:10 AM   [place]%Wound Bed 100 8/30/2019  2:10 AM   Red%Wound Bed 100 8/30/2019  2:10 AM   Yellow%Wound Bed 0 8/30/2019  2:10 AM   Black%Wound Bed 0 8/30/2019  2:10 AM   Purple%Wound Bed 0 8/30/2019  2:10 AM   Other%Wound Bed 0 8/22/2019  2:06 PM   Number of days: 8       Wound 08/22/19 Buttocks Right distal medial buttock (Active)   Wound Image   8/22/2019  2:06 PM   Wound Pressure Stage  3 8/22/2019  2:06 PM   Dressing/Treatment Calmoseptine 8/30/2019  1:53 PM   Wound Length (cm) 0.2 cm 8/26/2019  3:00 PM   Wound Width (cm) 0.2 cm 8/26/2019  3:00 PM   Wound Depth (cm) 0.2 cm 8/26/2019  3:00 PM   Wound Surface Area (cm^2) 0.04 cm^2 8/26/2019  3:00 PM   Change in Wound Size % (l*w) 0 8/26/2019  3:00 PM   Wound Volume (cm^3) 0.01 cm^3 8/26/2019  3:00 PM   Wound Healing % 50 8/26/2019  3:00 PM   Distance Tunneling (cm) 0 cm 8/26/2019  3:00 PM   Tunneling Position ___ O'Clock 0 8/26/2019  3:00 PM   Undermining Starts ___ O'Clock 0 8/26/2019  3:00 PM Undermining Ends___ O'Clock 0 8/26/2019  3:00 PM   Undermining Maxium Distance (cm) 0 8/26/2019  3:00 PM   Wound Assessment Red 8/30/2019  7:55 AM   Drainage Amount None 8/30/2019  2:10 AM   Drainage Description Brown;Purulent 8/30/2019  2:10 AM   Odor None 8/30/2019  7:55 AM   [place]-wound Assessment Dry 8/30/2019  2:10 AM   Non-staged Wound Description Full thickness 8/30/2019  2:10 AM   [place]%Wound Bed 100 8/30/2019  2:10 AM   Red%Wound Bed 100 8/30/2019  2:10 AM   Yellow%Wound Bed 0 8/30/2019  2:10 AM   Black%Wound Bed 0 8/30/2019  2:10 AM   Purple%Wound Bed 0 8/30/2019  2:10 AM   Other%Wound Bed 0 8/22/2019  2:06 PM   Number of days: 8       Wound 08/25/19 Buttocks Open area between gluteal folds (Active)   Wound Pressure Stage  2 8/27/2019  5:00 PM   Dressing/Treatment Calmoseptine 8/30/2019  1:53 PM   Wound Cleansed Other (Comment) 8/27/2019  5:00 PM   Number of days: 4        Elimination:  Continence:   · Bowel: No  · Bladder: Yes  Urinary Catheter: Doe. Please remove tonight 09/04/2019 per Dr. Leopoldo Gan   Colostomy/Ileostomy/Ileal Conduit: No       Date of Last BM: 08/30/19    Intake/Output Summary (Last 24 hours) at 8/30/2019 1510  Last data filed at 8/30/2019 0915  Gross per 24 hour   Intake 120 ml   Output 850 ml   Net -730 ml     I/O last 3 completed shifts: In: 120 [P.O.:120]  Out: 850 [Urine:850]    Safety Concerns: At Risk for Falls    Impairments/Disabilities:      None    Nutrition Therapy:  Current Nutrition Therapy:   - Oral Diet:  Cardiac    Routes of Feeding: Oral  Liquids:  Thin Liquids  Daily Fluid Restriction: no  Last Modified Barium Swallow with Video (Video Swallowing Test): not done    Treatments at the Time of Hospital Discharge:   Respiratory Treatments: Mucomyst, albuterol  Oxygen Therapy:  Patient on 4 Liters of oxygen nasal canula while pt eating and ambulating  Ventilator:    - BiPAP   IPAP: 18 cmH20, CPAP/EPAP: 10 cmH2O continuous    Rehab Therapies: Physical Therapy and

## 2019-08-30 NOTE — PROGRESS NOTES
General Surgery-Dr. Lizbeth Carranza Day: 6    Chief Complaint on Admission: Worsening respiratory status       Subjective:     Continues to require BiPAP  Denies F/C. Still getting local wound care to buttocks wound. Pt without complaints. Denies pain. Tolerating PO.     ROS:  Review of Systems   Constitutional: Negative for fever. HENT: Negative. Eyes: Negative. Respiratory: Positive for shortness of breath. Cardiovascular: Negative. Gastrointestinal: Negative. Endocrine: Negative. Genitourinary: Negative. Musculoskeletal: Negative. Skin: Positive for wound. Allergic/Immunologic: Negative. Neurological: Negative. Hematological: Negative. Psychiatric/Behavioral: Negative. Allergies  Clindamycin/lincomycin          Diagnosis Date    Acute on chronic diastolic congestive heart failure ([facility] 8/17/2019    Anemia     \"use to be on iron tablets\"    Arthritis     Carotid artery disease (HCC)     complete occlusion left ICA, status post right carotid endarterectomy    Cellulitis of right lower extremity 7/1/2016    Was admitted in [address] [place] [address] in 7/16    Chronic cough 12/18/2015    Chest x-ray showed mild pulmonary congestion that is chronic. And some atelectasis PFT showed no obstructive disease. Has restrictive lung disease.  CKD (chronic kidney disease)     per hx from ecf has listed as CKD stage 3 but pt is not aware of this dx    COPD (chronic obstructive pulmonary disease) (HCC)     H/O colonoscopy 8/10    Dr. Edwige Francisco H/O Doppler ultrasound 08/2009, 2011,5/13    carotid doppler left % blocked, right patent    [place] (hard of hearing)     no hearing aides    HTN (hypertension)     Hyperglycemia 11/19/2014    A1c 6.1    Hyperlipidemia     Left-sided carotid artery disease ([facility] 9/18/2015    Complete occlusion of left. R carotid endarterectomy Carotid doppler 5/13. R  Is patent.  Carotid doppler 6/15  R is patent    On home oxygen therapy

## 2019-08-30 NOTE — PROGRESS NOTES
Resp 20   Ht 5' 7.01\" (1.702 m)   Wt 207 lb 10.8 oz (94.2 kg)   SpO2 97%   BMI 32.52 kg/m²   Awake alert  Soft obese  edema    Assessment and Plan:  IMP:  As stated above    Plan     1 on lasix and replete K  2 renal sligth better not uremic monitor  3 on abx therapy  4 bp stable  Will follow           Electronically signed by Esme Yip MD on 8/30/2019 at 2:20 PM

## 2019-08-31 NOTE — PROGRESS NOTES
Patient bathed with complete linen change. Patient has call light in hand, bed in lowest position and two of the four side rails up for patient safety. Will continue to monitor patient.  Electronically signed by Estrella Issa RN on 8/31/2019 at 1:23 PM

## 2019-08-31 NOTE — PROGRESS NOTES
None      Assessment/Plan     Patient Active Problem List    Diagnosis Date Noted    Multifocal atrial tachycardia (HCC)     Gluteal cleft wound, right, initial encounter     Acute on chronic respiratory failure with hypoxia and hypercapnia (HCC) 2019    Acute respiratory failure ([facility] 2019    Acute on chronic diastolic congestive heart failure ([facility] 2019    Hypertension 2019    Congestive heart failure due to high blood pressure (HCC) 2019    Femoral artery occlusion (HCC) 2019    PVD (peripheral vascular disease) ([address] 06/10/2019    Arthritis of both knees 05/10/2018    Abdominal aortic aneurysm (AAA) without rupture ([facility] 2018     Overview Note:     Abdominal aortic aneurysm 4.2 cm incidental finding on PET scan  Seen Dr. Brenna Fletcher. Follow-up in one year per his recommendations      Squamous cell carcinoma of nose 2018     Overview Note:     3/2018 : scc of the nose   Seen Dr Sheyla Dockery. Had surgery and then radiation to the area ,  Seen Dr Delfino Cardenas : no chemo given.  Right knee pain 2018     Overview Note:     Patient has pain in the right knee for few months  Has seen Dr Andreina Collins and is going to have arthroscopic surgery  [address] [facility] nurse state they have all information and anesthesiologist approved it. EKG showed no acute changes looks normal. Done at [facility].  Pt denies any chest pain. No SOB. No h/o CHF      Renal insufficiency 2017     Overview Note:     Cr 1.7 but improved to 1.2 in , cr 1.0 in   resolved      Restrictive lung disease 2016     Overview Note:     Patient denies any shortness of breath. No chest pain. No cough. No edema.  Left-sided carotid artery disease ([facility]): R  2015     Overview Note:     Complete occlusion of left. R carotid endarterectomy  Carotid doppler . R  Is patent.   Carotid doppler 6/15  R is patent  Carotid doppler 10/16 R is 0-50%  Left is completely

## 2019-09-01 NOTE — PROGRESS NOTES
resection  12. History of CVA: Recent work-up including MRI was negative. 13. Hypertension: Blood pressure controlled. On calcium channel blocker. 14. Morbid obesity    CODE STATUS switch to DNR comfort care arrest  Needs placement. Diet DIET CARDIAC; Dietary Nutrition Supplements: Standard High Calorie Oral Supplement   DVT Prophylaxis [x] Lovenox, []  Heparin, [] SCDs, [] Warfarin  [] NOAC     GI Prophylaxis [] PPI,  [] H2 Blocker,  [] Carafate,  [x] Diet/Tube Feeds   Code Status DNR-CCA   MDM [] Low, [x] Moderate,[]  High     History of Present Illness:     Chief Complaint: Acute respiratory failure ([facility]    He was seen and examined today. Vapotherm was weaned down to high flow nasal cannula. No shortness of breath. Still with cough but not getting worse. No fever or chills overnight. No diarrhea, dysuria or urinary frequency. Ten point ROS reviewed negative, unless as noted above    Objective: Intake/Output Summary (Last 24 hours) at 9/1/2019 1217  Last data filed at 9/1/2019 1132  Gross per 24 hour   Intake 830 ml   Output 1680 ml   Net -850 ml      Vitals:   Vitals:    09/01/19 1209   BP:    Pulse:    Resp: 22   Temp:    SpO2: 96%     Physical Exam:   GEN Awake, in mild respiratory distress. On noninvasive ventilation  EYES Pupils are equally round. No scleral erythema, discharge, or conjunctivitis. HENT Mucous membranes are moist. Oral pharynx without exudates, no evidence of thrush. Scar nasal area  NECK Supple, no apparent thyromegaly or masses. RESP decreased breath sounds. No wheezes. CARDIO/VASC S1/S2 auscultated. Regular rate without appreciable murmurs, rubs, or gallops. No JVD or carotid bruits. Peripheral pulses equal bilaterally and palpable. No peripheral edema. GI Abdomen is soft without significant tenderness, masses, or guarding. Bowel sounds are normoactive. Rectal exam deferred. MSK No gross joint deformities. SKIN Normal coloration, warm, dry.   NEURO Cranial

## 2019-09-01 NOTE — PROGRESS NOTES
Nephrology Progress Note  9/1/2019 11:17 AM  Subjective:   Admit Date: 8/25/2019  PCP: Felicia Moya MD  Interval History:  Pt appear better today and less sob    Diet: DIET CARDIAC; Dietary Nutrition Supplements: Standard High Calorie Oral Supplement  Pain is:Mild      Data:   Scheduled Meds:   furosemide  20 mg Intravenous BID    potassium chloride  40 mEq Oral BID    miconazole   Topical BID    midodrine  5 mg Oral TID WC    enoxaparin  30 mg Subcutaneous Daily    sodium chloride flush  10 mL Intravenous 2 times per day    acetylcysteine  600 mg Inhalation BID    aspirin  81 mg Oral Daily    atorvastatin  10 mg Oral Daily    cilostazol  50 mg Oral BID    ipratropium-albuterol  1 vial Inhalation 4x Daily    metoprolol succinate  25 mg Oral BID    omega-3 acid ethyl esters  2 g Oral BID    verapamil  240 mg Oral Daily     Continuous Infusions:  PRN Meds:potassium chloride **OR** potassium alternative oral replacement **OR** potassium chloride, potassium chloride, nitroGLYCERIN, magnesium hydroxide, ondansetron, sodium chloride flush, acetaminophen, albuterol, diphenhydrAMINE, guaiFENesin-dextromethorphan  I/O last 3 completed shifts: In: 710 [P.O.:710]  Out: 1680 [Urine:1680]  I/O this shift:  In: 240 [P.O.:240]  Out: 100 [Urine:100]    Intake/Output Summary (Last 24 hours) at 9/1/2019 1117  Last data filed at 9/1/2019 0929  Gross per 24 hour   Intake 710 ml   Output 1680 ml   Net -970 ml     CBC:   Recent Labs     08/31/19  0406 08/31/19  1407 09/01/19  0312   WBC 19.7* 24.9* 22.5*   HGB 10.2* 10.7* 10.4*    310 314     BMP:    Recent Labs     08/30/19  0330 08/31/19  0406 09/01/19  0312    138 134*   K 3.2* 3.3* 3.8   CL 90* 91* 91*   CO2 33* 34* 30   BUN 57* 57* 56*   CREATININE 2.1* 2.0* 2.1*   GLUCOSE 149* 147* 159*     Hepatic: No results for input(s): AST, ALT, ALB, BILITOT, ALKPHOS in the last 72 hours. Troponin: No results for input(s): TROPONINI in the last 72 hours.   BNP:

## 2019-09-01 NOTE — PROGRESS NOTES
Hospitalist Progress Note      Name:  Kin Moreno /Age/Sex: 1934  (80 y.o. male)   MRN & CSN:  [id number] & [id number] Admission Date/Time: 2019  3:28 PM   Location:  -A PCP: Mason Ross MD         Hospital Day: 8    Assessment and Plan:   Kin Moreno is a 80 y.o.  male  who presents with Acute respiratory failure ([address]    1. Acute on chronic hypoxic and Hypercapneic Respiratory Failure: Could be from heart failure, COPD. Requiring continuous BiPAP. Has hypoxemia even on NC. Needs to have BiPAP on discharge. Chest x-ray yesterday with bibasilar atelectasis and small effusion. 2. Leukocytosis: WBC 24. Afebrile. No new cough or worsening SOB. Will monitor off antibiotics. 3. Congestive heart failure, acute, diastolic: Echocardiogram with ejection fraction of 55 to 50%. . Continue beta-blocker. Cardiology following. Echocardiogram EF 60%. 4. Paroxysmal atrial fibrillation  versus multifocal atrial tachycardia: currently on beta-blocker. Not on anticoagulation. Electrophysiology following. 5. Acute kidney injury on chronic kidney disease stage III: Creatinine 2 today. Baseline at 1.2-1.3. Lasix restarted yesterday because of shortness of breath. Nephrology consulted. 6. Hypokalemia: Replace accordingly  7. Chronic metabolic alkalosis: Could be from respiratory acidosis due to COPD and possible obstructive sleep apnea. Given acetazolamide once. 8. Ulcer, coccyx: Present on admission. CT of the pelvis with no abscess. Surgery on consult. Recommended local wound care. Wound team following. 9. Probable acute bronchitis: Levaquin completed. 10. Peripheral artery disease: Continue aspirin and cilostazol. Continue Lipitor. 11. 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 History of CVA: Recent work-up including MRI was negative. 13. Hypertension: Blood pressure controlled. On calcium channel blocker.   14. Morbid

## 2019-09-01 NOTE — PROGRESS NOTES
Patient repositioned with pillow therapy, bed pad changed, small bowel movement. Patient has call light in lap, bed in lowest position and two of the four side rails up for patient safety. Will continue to monitor patient.  Electronically signed by Miles Bocanegra RN on 9/1/2019 at 4:59 PM

## 2019-09-02 NOTE — FLOWSHEET NOTE
Patient cleaned after having stool liquid and brown has open areas on bttocks with mepilex applied '.  Nystatin powder applied to reddened areas on groin

## 2019-09-02 NOTE — PROGRESS NOTES
Hospitalist Progress Note      Name:  Dallin Gonzales /Age/Sex: 1934  (80 y.o. male)   MRN & CSN:  [id number] & [id number] Admission Date/Time: 2019  3:28 PM   Location:  -A PCP: Melonie Rojas MD         Hospital Day: 9    Assessment and Plan:   Dallin Gonzales is a 80 y.o.  male  who presents with Acute respiratory failure ([address]    1. Acute on chronic hypoxic and Hypercapneic Respiratory Failure: Could be from heart failure, COPD. Was requiring continuous BiPAP, now on high flow nasal cannula. Chest x-ray 19 with small left pleural effusion with bibasilar atelectasis left greater than right. 2. Leukocytosis: WBC 19. Afebrile. No new cough or worsening SOB. Chest x-ray with no pneumonia. No dysuria or urinary frequency. Pressure ulcer does not look infected. Sputum culture pending. Procalcitonin 0.5. Will monitor off antibiotics. 3. Congestive heart failure, acute, diastolic: Echocardiogram with ejection fraction of 55 to 50%. . Continue beta-blocker. Cardiology following. Echocardiogram EF 60%. 4. Paroxysmal atrial fibrillation  versus multifocal atrial tachycardia: currently on beta-blocker. Not on anticoagulation. Electrophysiology following. 5. Acute kidney injury on chronic kidney disease stage III: Creatinine 2.9 today. Baseline at 1.2-1.3. Lasix restarted yesterday because of shortness of breath. Nephrology consulted. 6. Hypokalemia: Replace accordingly  7. Chronic metabolic alkalosis, Resolving: Could be from respiratory acidosis due to COPD and possible obstructive sleep apnea. Given acetazolamide once. 8. Ulcer, coccyx: Present on admission. CT of the pelvis with no abscess. Surgery on consult. Recommended local wound care. Wound team following. 9. Probable acute bronchitis: Levaquin completed. 10. Peripheral artery disease: Continue aspirin and cilostazol. Continue Lipitor.   11. Squamous cell carcinoma of the nose: Status post

## 2019-09-02 NOTE — PROGRESS NOTES
Per Dr. Karen Osorio patient is not allowed to leave unitl 02 is 5 liters or below. Dr. Theron Hurt is aware.

## 2019-09-02 NOTE — PROGRESS NOTES
patent. Carotid doppler 6/15  R is patent  Carotid doppler 10/16 R is 0-50%  Left is completely occluded,referred to Dr. Sherly Vitale. Recheck in one year      Hyperglycemia 11/19/2014     Overview Note:     A1c 6.1, 6.2      Essential hypertension 08/15/2013     Overview Note:     Patient has hypertension and is on medications  He is taking verapamil 240 mg daily, losartan 100 mg daily, metoprolol 50 mg twice a day      Mixed hyperlipidemia 08/15/2013     Overview Note:     Patient has hyperlipidemia and is taking lovastatin 40 mg daily       DARY on CKD -slwoly improving  \Hypokalemia  Leukocytosis  Acute on chronic hypoxic hypercapneic resp failure  Chronic metabolic alkalosis  Morbid obesity  Multifocal pneumonia  Small left pleural effusion  Bibasilar atelectasis       1. Keep sats > 88%  2. ICS  3. CPT  4. OOB  5. BIPAP  6. CXR in am  7. C/w present management  No follow-ups on file.     Electronically signed by Johny Aguero MD on 9/2/2019 at 11:34 AM

## 2019-09-02 NOTE — PROGRESS NOTES
Nephrology Progress Note  9/2/2019 8:48 AM  Subjective:   Admit Date: 8/25/2019  PCP: Mason Ross MD  Interval History:  Pt doing well overall and keep negative less sob    Diet: DIET CARDIAC; Dietary Nutrition Supplements: Standard High Calorie Oral Supplement  Pain is:Mild      Data:   Scheduled Meds:   furosemide  20 mg Intravenous BID    potassium chloride  40 mEq Oral BID    miconazole   Topical BID    midodrine  5 mg Oral TID WC    enoxaparin  30 mg Subcutaneous Daily    sodium chloride flush  10 mL Intravenous 2 times per day    acetylcysteine  600 mg Inhalation BID    aspirin  81 mg Oral Daily    atorvastatin  10 mg Oral Daily    cilostazol  50 mg Oral BID    ipratropium-albuterol  1 vial Inhalation 4x Daily    metoprolol succinate  25 mg Oral BID    omega-3 acid ethyl esters  2 g Oral BID    verapamil  240 mg Oral Daily     Continuous Infusions:  PRN Meds:potassium chloride **OR** potassium alternative oral replacement **OR** potassium chloride, potassium chloride, nitroGLYCERIN, magnesium hydroxide, ondansetron, sodium chloride flush, acetaminophen, albuterol, diphenhydrAMINE, guaiFENesin-dextromethorphan  I/O last 3 completed shifts: In: 540 [P.O.:540]  Out: 600 [Urine:600]  No intake/output data recorded. Intake/Output Summary (Last 24 hours) at 9/2/2019 0848  Last data filed at 9/1/2019 1600  Gross per 24 hour   Intake 540 ml   Output 600 ml   Net -60 ml     CBC:   Recent Labs     08/31/19  1407 09/01/19  0312 09/02/19  0305   WBC 24.9* 22.5* 19.1*   HGB 10.7* 10.4* 10.4*    314 306     BMP:    Recent Labs     08/31/19  0406 09/01/19  0312 09/02/19  0305    134* 136   K 3.3* 3.8 3.8   CL 91* 91* 92*   CO2 34* 30 30   BUN 57* 56* 55*   CREATININE 2.0* 2.1* 1.9*   GLUCOSE 147* 159* 156*     Hepatic: No results for input(s): AST, ALT, ALB, BILITOT, ALKPHOS in the last 72 hours. Troponin: No results for input(s): TROPONINI in the last 72 hours.   BNP: No results for input(s): BNP in the last 72 hours. Lipids: No results for input(s): CHOL, HDL in the last 72 hours. Invalid input(s): LDLCALCU  ABGs:   Lab Results   Component Value Date    PO2ART 50 2019    YFW6NDI 66.0 2019     INR: No results for input(s): INR in the last 72 hours. Renal Labs  Albumin:    Lab Results   Component Value Date    LABALBU 3.0 2019     Calcium:    Lab Results   Component Value Date    CALCIUM 8.9 2019     Phosphorus:    Lab Results   Component Value Date    PHOS 3.7 2019     U/A:    Lab Results   Component Value Date    NITRU NEGATIVE 2019    COLORU STRAW 2019    WBCUA 2 2019    RBCUA 36 2019    MUCUS RARE 2019    TRICHOMONAS NONE SEEN 2019    BACTERIA NEGATIVE 2019    CLARITYU CLEAR 2019    SPECGRAV 1.012 2019    UROBILINOGEN NORMAL 2019    BILIRUBINUR NEGATIVE 2019    BLOODU SMALL 2019    KETUA NEGATIVE 2019     ABG:    Lab Results   Component Value Date    YAM6JQZ 66.0 2019    PO2ART 50 2019    YZG5QMW 43.8 2019     HgBA1c:    Lab Results   Component Value Date    LABA1C 6.2 2018     Microalbumen/Creatinine ratio:  No components found for: RUCREAT          Objective:   Vitals: /65   Pulse 91   Temp 99.5 °F (37.5 °C) (Axillary)   Resp 18   Ht 5' 7.01\" (1.702 m)   Wt 207 lb 7.3 oz (94.1 kg)   SpO2 90%   BMI 32.48 kg/m²   General appearance: awake weak  HEENT: Head: Normal, normocephalic, atraumatic.   Neck: supple, symmetrical, trachea midline  Lungs: diminished breath sounds bilaterally  Heart: S1, S2 normal  Abdomen: abnormal findings:  soft nt  Extremities: edema trace  Neurologic: Mental status: alertness: alert      Patient Active Problem List:     Essential hypertension     Mixed hyperlipidemia     Hyperglycemia     Left-sided carotid artery disease ([address]): R      Restrictive lung disease     Renal insufficiency     Right knee pain     Squamous cell carcinoma of nose     Abdominal aortic aneurysm (AAA) without rupture (HCC)     Arthritis of both knees     PVD (peripheral vascular disease) (HCC)     Femoral artery occlusion (HCC)     Acute on chronic diastolic congestive heart failure (HCC)     Hypertension     Congestive heart failure due to high blood pressure (HCC)     Acute on chronic respiratory failure with hypoxia and hypercapnia (HCC)     Acute respiratory failure (HCC)     Gluteal cleft wound, right, initial encounter     Multifocal atrial tachycardia (HCC)    Assessment and Plan:      IMP:  arf from atn on ckd 3  htn  resp failure  Diastolic heart failure  arrythmia  hypokalemia    Plan     1 renal stable and keep negative with cardiorenal  2 bp stable overall  3 o2 better off bipap  4 need work on OOB and rehab  5 cardiac monitor  6 K stable  Ok for dc planning from renal with current meds             Esme Yip MD

## 2019-09-03 NOTE — DISCHARGE SUMMARY
Discharge Summary    Name:  Amisha Leal /Age/Sex:   (80 y.o. male)   MRN & CSN:  [id number] & [id number] Admission Date/Time: 2019  3:28 PM   Attending:  Kailash Quintero MD Discharging Physician: Kailash Quintero MD     Hospital Course:   Amisha Leal is a 80 y.o.  male  who presented from [address] [place] [address] for further management of Acute respiratory failure ([facility], [address]    1. Acute on chronic hypoxic and Hypercapneic Respiratory Failure - back to baseline at discharge:  secondary to heart failure  and COPD exacerbation. He was aggressively diuresed and also treated for COPD exacerbation. He was requiring continuous BiPAP on admission but this improved to high flow nasal cannula 4L NC at time of discharge back to SNF (this is about his baseline O2 requirement). VQ scan was low probability for PE but showed features concerning for PAUL obstructing mass but this was considered a false positive finding as Ct chest done 19 did not show any such mass. Aggressive bronchopulmonary hygiene was instituted by respiratory therapist. Pulmonology was on consult (dr Carlos Munoz) and he will follow up in clinic. 2. Leukocytosis - resolved: Afebrile. No new cough or worsening SOB. Chest x-ray with no pneumonia. No dysuria or urinary frequency. Pressure ulcer did not look infected. Sputum culture only grew coimmensal yasir. Procalcitonin 0.5. Was monitored off antibiotics. 3. Congestive heart failure, acute, diastolic: Echocardiogram with ejection fraction of 60%. . Continued on beta-blocker and oral diuretics. Cardiology following. 4. Paroxysmal atrial fibrillation  versus multifocal atrial tachycardia: currently on beta-blocker. Not on anticoagulation. 5. Acute kidney injury on chronic kidney disease stage III: Creatinine at 1.7 at time of discharge. Baseline at 1.2-1.3. On oral Lasix and metolazone. Nephrology was on consult.     6. Hypokalemia: Replaced accordingly. 7. Chronic metabolic alkalosis, Resolved: Could be from respiratory acidosis due to COPD and possible obstructive sleep apnea. Given acetazolamide once. 8. Ulcer, coccyx: Present on admission. CT of the pelvis with no abscess. Surgery on consult. Recommended local wound care. Wound team following. 9. Probable acute bronchitis: Levaquin completed. 10. Peripheral artery disease: Continued aspirin and cilostazol. Continue Lipitor. 11. 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 12. History of CVA: Recent work-up including MRI was negative. 13. Hypertension: Blood pressure controlled. On calcium channel blocker. 15. Morbid obesity    The patient expressed appropriate understanding of and agreement with the discharge recommendations, medications, and plan. Consults this admission:  IP CONSULT TO GENERAL SURGERY  IP CONSULT TO SOCIAL WORK  PHARMACY CONSULT FOR RENAL DOSING  IP CONSULT TO PULMONOLOGY  IP CONSULT TO CARDIOLOGY  IP CONSULT TO [address] RENAL DOSING    Discharge Instruction:     Diet:  cardiac diet liquids   Activity: activity as tolerated  Disposition: discharged back to SNF.   Condition on discharge: Stable    Discharge Medications:      Sharon Lopez   Home Medication Instructions WEX:[id number]    Printed on:09/04/19 1139   Medication Information                      Acetaminophen (TYLENOL 8 HOUR ARTHRITIS PAIN PO)  Take by mouth every 8 hours as needed             acetylcysteine (MUCOMYST) 20 % nebulizer solution  Inhale 3 mLs into the lungs 2 times daily             albuterol (ACCUNEB) 1.25 MG/3ML nebulizer solution  Inhale 1 ampule into the lungs every 6 hours as needed for Wheezing             aspirin 81 MG tablet  Take 81 mg by mouth daily             atorvastatin (LIPITOR) 10 MG tablet  Take 10 mg by mouth daily             Cholecalciferol (VITAMIN D3) 71549 units CAPS  Take 1 capsule by mouth Indications: give 1 capsule once JVD or carotid bruits. Peripheral pulses equal bilaterally and palpable. No peripheral edema. GI Abdomen is soft without significant tenderness, masses, or guarding. Bowel sounds are normoactive.  No costovertebral angle tenderness. Normal appearing external genitalia. Doe catheter  not present. MSK No gross joint deformities. SKIN Normal coloration, warm, dry. NEURO Cranial nerves appear grossly intact, normal speech, no lateralizing weakness. PSYCH Awake, alert, oriented x 4. Affect appropriate. BMP/CBC  Recent Labs     09/02/19  0305 09/03/19  0001 09/04/19  0642    136 134*   K 3.8 4.2 3.9   CL 92* 92* 91*   CO2 30 31 30   BUN 55* 55* 49*   CREATININE 1.9* 1.8* 1.7*   WBC 19.1* 15.7* 11.2*   HCT 35.1* 31.2* 31.4*    301 301       IMAGING:  XR CHEST PORTABLE [[id number]] Collected: 09/03/19 0724      Order Status: Completed Updated: 09/03/19 0728     Narrative:       EXAMINATION:  ONE XRAY VIEW OF THE CHEST    9/3/2019 5:15 am    COMPARISON:  September 1, 2019    HISTORY:  ORDERING SYSTEM PROVIDED HISTORY: hypoxia  TECHNOLOGIST PROVIDED HISTORY:  Reason for exam:->hypoxia  Reason for Exam: hypoxia  Acuity: Acute  Type of Exam: Subsequent/Follow-up    FINDINGS:  Stable cardiomediastinal silhouette.  Mild pulmonary vascular congestion is  noted.  Bibasilar atelectasis is noted.  Interval decrease in size of small  left-sided pleural effusion.  There is no pneumothorax.  The osseous  structures are stable.     Impression:       1.  Mild pulmonary vascular congestion.     2.  Interval decrease in size of small left-sided pleural effusion.     XR CHEST PORTABLE [[id number]] Collected: 09/01/19 0755     Order Status: Completed Updated: 09/01/19 0931     Narrative:       EXAMINATION:  ONE XRAY VIEW OF THE CHEST    9/1/2019 6:13 am    COMPARISON:  08/28/2019    HISTORY:  ORDERING SYSTEM PROVIDED HISTORY: SOB  TECHNOLOGIST PROVIDED HISTORY:  Reason for exam:->SOB  Reason for Exam: sob  Acuity:

## 2019-09-03 NOTE — CARE COORDINATION
Level of Care received from [address] [place] [address] on Aging . Placed in patient's packet in soft chart.

## 2019-09-04 NOTE — PROGRESS NOTES
Abdomen is soft without significant tenderness, masses, or guarding. Bowel sounds are normoactive.        No costovertebral angle tenderness. Normal appearing external genitalia. Doe catheter  not present. MSK    No gross joint deformities. SKIN    Normal coloration, warm, dry. NEURO           Cranial nerves appear grossly intact, normal speech, no lateralizing weakness. PSYCH            Awake, alert, oriented x 4. Affect appropriate.     Medications:   Medications:    furosemide  40 mg Oral BID    metOLazone  2.5 mg Oral Once per day on Mon Wed Fri    enoxaparin  40 mg Subcutaneous Daily    potassium chloride  40 mEq Oral BID    miconazole   Topical BID    midodrine  5 mg Oral TID WC    sodium chloride flush  10 mL Intravenous 2 times per day    acetylcysteine  600 mg Inhalation BID    aspirin  81 mg Oral Daily    atorvastatin  10 mg Oral Daily    cilostazol  50 mg Oral BID    ipratropium-albuterol  1 vial Inhalation 4x Daily    metoprolol succinate  25 mg Oral BID    omega-3 acid ethyl esters  2 g Oral BID    verapamil  240 mg Oral Daily      Infusions:   PRN Meds:   potassium chloride 40 mEq PRN   Or     potassium alternative oral replacement 40 mEq PRN   Or     potassium chloride 10 mEq PRN   potassium chloride 10 mEq PRN   nitroGLYCERIN 0.4 mg Q5 Min PRN   magnesium hydroxide 30 mL Daily PRN   ondansetron 4 mg Q6H PRN   sodium chloride flush 10 mL PRN   acetaminophen 650 mg Q8H PRN   albuterol 1.25 mg Q6H PRN   diphenhydrAMINE 25 mg Q6H PRN   guaiFENesin-dextromethorphan 5 mL Q4H PRN       CBC   Recent Labs     09/02/19  0305 09/03/19  0001 09/04/19  0642   WBC 19.1* 15.7* 11.2*   HGB 10.4* 9.5* 9.4*   HCT 35.1* 31.2* 31.4*    301 301      BMP Recent Labs     09/02/19  0305 09/03/19  0001 09/04/19  0642    136 134*   K 3.8 4.2 3.9   CL 92* 92* 91*   CO2 30 31 30   PHOS 3.7 3.9 4.5   BUN 55* 55* 49*   CREATININE 1.9* 1.8* 1.7*     Imaging reviewed,    Electronically

## 2019-09-04 NOTE — PROGRESS NOTES
2045 Called report to Medhat Estrada. Patient transporting ems on high flow nasal cannula at 5 liters. Patient educated on medication given. Patient verbalized understanding. Patient educated on transfer to [facility]. Patient comfortable, waiting on ems at this time. All personal items packed.      Jerald Olsen RN

## 2019-09-04 NOTE — PROGRESS NOTES
Patient awake and alert sitting up in bed eating breakfast. Patient states\" I'm going to go back home to my rehab, I'm not going anywhere else with what they pulled last night\"  Kwaku Gutierrez RN nurse manager is aware and Theresa Song RN charge nurse is aware.

## 2019-09-04 NOTE — PROGRESS NOTES
Dr. Palmira Oates gave new verbal order \" Keep hermosillo in patient and have nursing home discontinue tonight\" . Favian Rich is aware .

## 2019-09-04 NOTE — PROGRESS NOTES
Pulmonary and Critical Care  Progress Note      VITALS:  BP (!) 102/57   Pulse 81   Temp 97.9 °F (36.6 °C) (Oral)   Resp 17   Ht 5' 7.01\" (1.702 m)   Wt 205 lb 0.4 oz (93 kg)   SpO2 92%   BMI 32.10 kg/m²     Subjective:   CHIEF COMPLAINT :SOB     HPI:                The patient is a 80 y.o. male with significant past medical history of CKD, COPD, Obesity, chronic resp failure, HTN, HLD, COPD, 60 pk yr smoking quit in 2016, squamous cell ca of the nose  presents with complaints of SOB. He has been transferred from [facility] where he was admitted for the SOB, he had diuresed about 7 Litres. He was being treated with the  abx and steroids and nebs. He was also on BIPAP. He was found to have multifocal pneumonia. His cultures have been negative and his Resp PCR is  Negative. He had no sick exposure, no recent travel, no headaches, no n/v, no abdominal pain, no diarrhea, no dysuria. At this time he is lying in the bed. He is not in acute resp distress. His CXR showed no acute abnormalities. He is on BIPAP. His CXR showed  Mild congestion and small pleural effusions with basal atelectasis    Objective:   PHYSICAL EXAM:    LUNGS:Decreased air entry bilateral bases  Abd-soft, BS+,NT  Ext- no pedal edema  CVs-s1s2, no murmurs      DATA:    CBC:  Recent Labs     09/02/19 0305 09/03/19 0001 09/04/19  0642   WBC 19.1* 15.7* 11.2*   RBC 3.81* 3.45* 3.43*   HGB 10.4* 9.5* 9.4*   HCT 35.1* 31.2* 31.4*    301 301   MCV 92.1 90.4 91.5   MCH 27.3 27.5 27.4   MCHC 29.6* 30.4* 29.9*   RDW 14.1 13.9 13.7   SEGSPCT 81.0* 77.6* 75.0*   BANDSPCT 4*  --   --       BMP:  Recent Labs     09/02/19 0305 09/03/19  0001 09/04/19  0642    136 134*   K 3.8 4.2 3.9   CL 92* 92* 91*   CO2 30 31 30   BUN 55* 55* 49*   CREATININE 1.9* 1.8* 1.7*   CALCIUM 8.9 8.6 8.6   GLUCOSE 156* 165* 131*      ABG:  No results for input(s): PH, PO2ART, GXZ3BVO, HCO3, BEART, O2SAT in the last 72 hours.   BNP  No results found for: BNP D-Dimer:  No results found for: DDIMER   1. Radiology: None      Assessment/Plan     Patient Active Problem List    Diagnosis Date Noted    Multifocal atrial tachycardia (HCC)     Gluteal cleft wound, right, initial encounter     Acute on chronic respiratory failure with hypoxia and hypercapnia ([address] 2019    Acute respiratory failure ([address] 2019    Acute on chronic diastolic congestive heart failure ([address] 2019    Hypertension 2019    Congestive heart failure due to high blood pressure (HCC) 2019    Femoral artery occlusion ([address] 2019    PVD (peripheral vascular disease) ([address] 06/10/2019    Arthritis of both knees 05/10/2018    Abdominal aortic aneurysm (AAA) without rupture ([address] 2018     Overview Note:     Abdominal aortic aneurysm 4.2 cm incidental finding on PET scan  Seen Dr. Chucho Hawley. Follow-up in one year per his recommendations      Squamous cell carcinoma of nose 2018     Overview Note:     3/2018 : scc of the nose   Seen Dr Hansel Bates. Had surgery and then radiation to the area ,  Seen Dr Aureliano Noland : no chemo given.  Right knee pain 2018     Overview Note:     Patient has pain in the right knee for few months  Has seen Dr Mady Zarate and is going to have arthroscopic surgery  [address] [facility] nurse state they have all information and anesthesiologist approved it. EKG showed no acute changes looks normal. Done at [facility].  Pt denies any chest pain. No SOB. No h/o CHF      Renal insufficiency 2017     Overview Note:     Cr 1.7 but improved to 1.2 in , cr 1.0 in   resolved      Restrictive lung disease 2016     Overview Note:     Patient denies any shortness of breath. No chest pain. No cough. No edema.  Left-sided carotid artery disease ([address]): R  2015     Overview Note:     Complete occlusion of left. R carotid endarterectomy  Carotid doppler . R  Is patent.   Carotid doppler 6/15  R is patent  Carotid

## 2019-09-04 NOTE — CARE COORDINATION
Transport arranged with [facility] p/u at 12:45pm . [employer] approved transport by [employer] . Faxed PAS/R and LOC to Jg Duran at [facility] and notified her of discharge time.  Will fax discharge orders when completed by physician

## 2019-09-05 PROBLEM — L89.322 PRESSURE INJURY OF LEFT BUTTOCK, STAGE 2 (HCC): Status: ACTIVE | Noted: 2019-01-01

## 2019-09-05 NOTE — PROGRESS NOTES
Diagnosis    Essential hypertension    Mixed hyperlipidemia    Hyperglycemia    Left-sided carotid artery disease ([facility]): R     Restrictive lung disease    Renal insufficiency    Right knee pain    Squamous cell carcinoma of nose    Abdominal aortic aneurysm (AAA) without rupture (HCC)    Arthritis of both knees    PVD (peripheral vascular disease) (HCC)    Femoral artery occlusion (HCC)    Acute on chronic diastolic congestive heart failure (HCC)    Hypertension    Congestive heart failure due to high blood pressure (HCC)    Acute on chronic respiratory failure with hypoxia and hypercapnia (HCC)    Acute respiratory failure (HCC)    Gluteal cleft wound, right, initial encounter    Multifocal atrial tachycardia (HCC)    Pressure injury of left buttock, stage 2       REVIEW OF SYSTEMS    Constitutional: negative for chills, fatigue, fevers and malaise  Respiratory: negative for cough and shortness of breath  Cardiovascular: negative for chest pain and chest pressure/discomfort  Integument/breast: positive for skin lesion(s)  Neurological: negative for headaches      Objective: There were no vitals taken for this visit. PHYSICAL EXAM  General Appearance: alert and oriented to person, place and time, well-developed and well-nourished, in no acute distress  Pulmonary/Chest: clear to auscultation bilaterally- no wheezes, rales or rhonchi, normal air movement, no respiratory distress  Cardiovascular: normal rate and normal S1 and S2  Dermatologic exam: Visual inspection of the periwound reveals the skin to be normal in turgor and texture  Wound exam: see wound description below in procedure note    Left buttock cluster: Length: 3 cm, width 1 cm, depth 0.1 cm, 100% pink    Assessment:       Tha Loyola  appears to have a non-healing wound of the left buttock. The etiology of the wound is felt to be pressure.  There are multiple complicating factors including chronic pressure, decreased

## 2019-09-24 PROBLEM — G47.33 OSA (OBSTRUCTIVE SLEEP APNEA): Status: ACTIVE | Noted: 2019-01-01

## 2019-09-24 PROBLEM — R06.02 SOB (SHORTNESS OF BREATH) ON EXERTION: Status: ACTIVE | Noted: 2019-01-01

## 2019-09-24 PROBLEM — E66.9 OBESITY (BMI 30-39.9): Status: ACTIVE | Noted: 2019-01-01

## 2019-09-24 PROBLEM — J44.9 COPD (CHRONIC OBSTRUCTIVE PULMONARY DISEASE) (HCC): Status: ACTIVE | Noted: 2019-01-01

## 2019-09-24 PROBLEM — G47.19 EXCESSIVE DAYTIME SLEEPINESS: Status: ACTIVE | Noted: 2019-01-01

## 2019-09-24 PROBLEM — Z87.891 EX-CIGARETTE SMOKER: Status: ACTIVE | Noted: 2019-01-01

## 2019-09-24 NOTE — PROGRESS NOTES
 H/O Doppler ultrasound 08/2009, 2011,5/13    carotid doppler left % blocked, right patent    [place] (hard of hearing)     no hearing aides    HTN (hypertension)     Hyperglycemia 11/19/2014    A1c 6.1    Hyperlipidemia     Left-sided carotid artery disease (HCC) 9/18/2015    Complete occlusion of left. R carotid endarterectomy Carotid doppler 5/13. R  Is patent. Carotid doppler 6/15  R is patent    On home oxygen therapy     2l/nc at night and prn    PVD (peripheral vascular disease) (HCC)     hx per old chart    Squamous cell carcinoma of nose 4/8/2018    3/2018 : scc of the nose  Seen Dr Betty Carter- tx with radiation tx     Tobacco abuse disorder 8/15/2013    60 pack year history. Quit in 2016    Ulcer of right foot with fat layer exposed ([facility] 07/08/2016    per pt on 6*19/2019\"all cleared up\"    Wears glasses        Past Surgical History:   Procedure Laterality Date    CAROTID ENDARTERECTOMY  01/2004    right    COLONOSCOPY  ? over 10 yrs ago   [address] Right 6/20/2019    FEMORAL ENDARTERECTOMY RIGHT performed by Darrian Chino MD at [address]  06/10/2019    per old chart had peripheral angiogram done    SKIN BIOPSY      skin cancer removed from nose- left side of nose and did sinus cavity\" 3/2018       Social History     Socioeconomic History    Marital status:       Spouse name: Not on file    Number of children: 4    Years of education: 6    Highest education level: Not on file   Occupational History    Occupation: retired   Social Needs    Financial resource strain: Not on file    Food insecurity:     Worry: Not on file     Inability: Not on file   [employer] needs:     Medical: Not on file     Non-medical: Not on file   Tobacco Use    Smoking status: Former Smoker     Packs/day: 1.00     Years: 55.00     Pack years: 55.00     Types: Cigars, Cigarettes     Last attempt to quit: 7/1/2016     Years since quitting: 3.2    Smokeless tobacco: Never Used   Substance and Sexual Activity    Alcohol use: Not Currently     Alcohol/week: 0.0 standard drinks     Comment: rare/ per pt on 6/19/2019\"quit 2018- use to drink 1-2 tmes per month\"    Drug use: No    Sexual activity: Yes     Partners: Female   Lifestyle    Physical activity:     Days per week: Not on file     Minutes per session: Not on file    Stress: Not on file   Relationships    Social connections:     Talks on phone: Not on file     Gets together: Not on file     Attends Yazdanism service: Not on file     Active member of club or organization: Not on file     Attends meetings of clubs or organizations: Not on file     Relationship status: Not on file    Intimate partner violence:     Fear of current or ex partner: Not on file     Emotionally abused: Not on file     Physically abused: Not on file     Forced sexual activity: Not on file   Other Topics Concern    Not on file   Social History Narrative    Not on file       Review of Systems   Constitutional: Positive for fatigue. HENT: Negative for congestion and postnasal drip. Eyes: Negative for discharge and itching. Respiratory: Positive for cough and shortness of breath. Cardiovascular: Negative for chest pain and leg swelling. Gastrointestinal: Negative for abdominal distention and abdominal pain. Endocrine: Negative for cold intolerance and heat intolerance. Genitourinary: Negative for enuresis and frequency. Musculoskeletal: Negative for arthralgias and back pain. Allergic/Immunologic: Negative for environmental allergies and food allergies. Neurological: Negative for light-headedness and numbness. Hematological: Negative for adenopathy. Psychiatric/Behavioral: Negative for agitation and behavioral problems. Objective:   BP (!) 114/52   Pulse 88   Ht 5' 7\" (1.702 m)   Wt 210 lb (95.3 kg)   SpO2 96%   BMI 32.89 kg/m²   Body mass index is 32.89 kg/m².   Sleep Medicine 9/24/2019 (BENADRYL) tablet 50 mg (Completed)    Umeclidinium Bromide (INCRUSE ELLIPTA) 62.5 MCG/INH AEPB    albuterol (ACCUNEB) 1.25 MG/3ML nebulizer solution    ipratropium-albuterol (DUONEB) nebulizer solution 1 ampule (Completed)    ipratropium-albuterol (DUONEB) 0.5-2.5 (3) MG/3ML SOLN nebulizer solution    diphenhydrAMINE (BENADRYL) 25 MG tablet    methylPREDNISolone sodium (SOLU-MEDROL) injection 40 mg (Completed)    acetylcysteine (MUCOMYST) 20 % nebulizer solution       Other    SOB (shortness of breath) on exertion     C/w quitting smoking  C/w oxygen for now  Loose weight         Relevant Orders    Full PFT Study With Bronchodilator    6 Minute Walk Test    Obesity (BMI 30-39. 9)     Advised to loose weight with diet and exercise           Excessive daytime sleepiness     Advised to go for the sleep study  Loose weight         Ex-cigarette smoker     Advised to c/w quitting smoking                    Return in about 4 weeks (around 10/22/2019) for PFT, Sleep Study, 6 MWT.      Progress notes sent to the referring Provider    Jacques Ahmadi MD  9/24/2019  3:03 PM

## 2019-10-02 PROBLEM — N18.30 CHRONIC KIDNEY DISEASE, STAGE III (MODERATE) (HCC): Status: ACTIVE | Noted: 2019-01-01

## 2019-12-16 PROBLEM — J18.9 PNEUMONIA: Status: ACTIVE | Noted: 2019-01-01

## 2019-12-23 PROBLEM — I11.0 CONGESTIVE HEART FAILURE DUE TO HIGH BLOOD PRESSURE (HCC): Status: RESOLVED | Noted: 2019-01-01 | Resolved: 2019-01-01

## 2019-12-23 PROBLEM — J18.9 PNEUMONIA: Status: RESOLVED | Noted: 2019-01-01 | Resolved: 2019-01-01

## 2019-12-23 PROBLEM — J96.21 ACUTE ON CHRONIC RESPIRATORY FAILURE WITH HYPOXIA AND HYPERCAPNIA (HCC): Status: RESOLVED | Noted: 2019-01-01 | Resolved: 2019-01-01

## 2019-12-23 PROBLEM — J96.22 ACUTE ON CHRONIC RESPIRATORY FAILURE WITH HYPOXIA AND HYPERCAPNIA (HCC): Status: RESOLVED | Noted: 2019-01-01 | Resolved: 2019-01-01

## 2019-12-23 PROBLEM — J96.11 CHRONIC RESPIRATORY FAILURE WITH HYPOXIA (HCC): Chronic | Status: ACTIVE | Noted: 2019-01-01

## 2021-09-23 NOTE — CONSULTS
Umeclidinium Bromide (INCRUSE ELLIPTA) 62.5 MCG/INH AEPB Inhale into the lungs daily      cilostazol (PLETAL) 50 MG tablet Take 50 mg by mouth 2 times daily      Acetaminophen (TYLENOL 8 HOUR ARTHRITIS PAIN PO) Take by mouth every 8 hours as needed      albuterol (ACCUNEB) 1.25 MG/3ML nebulizer solution Inhale 1 ampule into the lungs every 6 hours as needed for Wheezing      aspirin 81 MG tablet Take 81 mg by mouth daily      atorvastatin (LIPITOR) 10 MG tablet Take 10 mg by mouth daily      losartan (COZAAR) 100 MG tablet Take 1 tablet by mouth daily 90 tablet 1    verapamil (VERELAN) 240 MG extended release capsule Take 1 capsule by mouth daily 90 capsule 1    Omega 3 1000 MG CAPS Take 2 capsules by mouth 2 times daily.            Objective:      BP (!) 102/57   Pulse 81   Temp 97.9 °F (36.6 °C) (Oral)   Resp 17   Ht 5' 7.01\" (1.702 m)   Wt 205 lb 0.4 oz (93 kg)   SpO2 92%   BMI 32.10 kg/m²   Bharat Risk Score: Bharat Scale Score: 19    LABS    CBC:   Lab Results   Component Value Date    WBC 11.2 09/04/2019    RBC 3.43 09/04/2019    HGB 9.4 09/04/2019    HCT 31.4 09/04/2019    MCV 91.5 09/04/2019    MCH 27.4 09/04/2019    MCHC 29.9 09/04/2019    RDW 13.7 09/04/2019     09/04/2019    MPV 9.9 09/04/2019     CMP:    Lab Results   Component Value Date     09/04/2019    K 3.9 09/04/2019    CL 91 09/04/2019    CO2 30 09/04/2019    BUN 49 09/04/2019    CREATININE 1.7 09/04/2019    GFRAA 47 09/04/2019    AGRATIO 1.4 08/26/2014    LABGLOM 39 09/04/2019    LABGLOM 63 08/26/2014    GLUCOSE 131 09/04/2019    PROT 6.9 05/14/2018    LABALBU 2.9 09/04/2019    CALCIUM 8.6 09/04/2019    BILITOT 0.6 10/12/2018    ALKPHOS 63 10/12/2018    AST 16 10/12/2018    ALT 10 10/12/2018     Albumin:    Lab Results   Component Value Date    LABALBU 2.9 09/04/2019     PT/INR:    Lab Results   Component Value Date    PROTIME 11.3 06/19/2019    INR 0.99 06/19/2019     HgBA1c:    Lab Results   Component Value Date Dressing Status Clean;Dry; Intact 9/4/2019 12:35 PM   Dressing Changed Changed/New 9/4/2019 12:35 PM   Dressing/Treatment Moisture barrier;Protective barrier 9/4/2019  9:15 AM   Wound Cleansed Rinsed/Irrigated with saline 9/4/2019 12:35 PM   Wound Length (cm) 3.5 cm 9/4/2019 12:35 PM   Wound Width (cm) 1 cm 9/4/2019 12:35 PM   Wound Depth (cm) 0.1 cm 9/4/2019 12:35 PM   Wound Surface Area (cm^2) 3.5 cm^2 9/4/2019 12:35 PM   Wound Volume (cm^3) 0.35 cm^3 9/4/2019 12:35 PM   Distance Tunneling (cm) 0 cm 9/4/2019 12:35 PM   Tunneling Position ___ O'Clock 0 9/4/2019 12:35 PM   Undermining Starts ___ O'Clock 0 9/4/2019 12:35 PM   Undermining Ends___ O'Clock 0 9/4/2019 12:35 PM   Undermining Maxium Distance (cm) 0 9/4/2019 12:35 PM   Wound Assessment Red 9/4/2019 12:35 PM   Drainage Amount Small 9/4/2019 12:35 PM   Drainage Description Serosanguinous 9/4/2019 12:35 PM   Odor None 9/4/2019 12:35 PM   [place]-wound Assessment Red 9/4/2019 12:35 PM   Non-staged Wound Description Full thickness 9/4/2019 12:35 PM   [place]%Wound Bed 0 9/4/2019 12:35 PM   Red%Wound Bed 100 9/4/2019 12:35 PM   Yellow%Wound Bed 0 9/4/2019 12:35 PM   Black%Wound Bed 0 9/4/2019 12:35 PM   Purple%Wound Bed 0 9/4/2019 12:35 PM   Other%Wound Bed 0 9/4/2019 12:35 PM   Number of days: 9       Response to treatment:  Well tolerated by patient. Pain Assessment:  Severity:    Quality of pain:   Wound Pain Timing/Severity:   Premedicated:     Plan:     Plan of Care: Wound 08/25/19 Buttocks Cluster-Dressing/Treatment: (barrier)  Wound 08/22/19 Buttocks Right-Dressing/Treatment: Moisture barrier, Protective barrier  Wound 08/22/19 Buttocks Right distal medial buttock-Dressing/Treatment: Moisture barrier, Protective barrier    Heels intact. Open area to buttocks covered with sacral border.   Continue turning every 2 hours    Specialty Bed Required : yes  [] Low Air Loss   [x] Pressure Redistribution  [] Fluid Immersion  [] Bariatric  [] Total Pressure admission

## 2023-05-11 NOTE — CARE COORDINATION
Met with patient ; he is alert and able to participate. Patient lives at assisted living at [facility]. Contacted [facility]; updated on discharge needs. Patient has CM DME for home O2.  Jeovanny Barrios RN Patient is scheduled for left total hip replacement with Dr Coulter on 5/24/23. Medical evaluation pending

## 2024-08-21 NOTE — PLAN OF CARE
Ongoing
Problem: Discharge Planning:  Goal: Discharged to appropriate level of care  8/28/2019 0210 by Melania Olivier RN  Outcome: Ongoing  8/27/2019 1726 by Dimitri Bang RN  Outcome: Ongoing  8/27/2019 1725 by Dimitri Bang RN  Outcome: Ongoing     Problem:  Activity Intolerance:  Goal: Ability to tolerate increased activity will improve  8/28/2019 0210 by Melania Olivier RN  Outcome: Ongoing  8/27/2019 1726 by Dimitri Bang RN  Outcome: Ongoing  8/27/2019 1725 by Dimitri Bang RN  Outcome: Ongoing     Problem: Airway Clearance - Ineffective:  Goal: Ability to maintain a clear airway will improve  8/28/2019 0210 by Melania Olivier RN  Outcome: Ongoing  8/27/2019 1726 by Dimitri Bang RN  Outcome: Ongoing  8/27/2019 1725 by Dimitri Bang RN  Outcome: Ongoing     Problem: Breathing Pattern - Ineffective:  Goal: Ability to achieve and maintain a regular respiratory rate will improve  8/28/2019 0210 by Melania Olivier RN  Outcome: Ongoing  8/27/2019 1726 by Dimitri Bang RN  Outcome: Ongoing  8/27/2019 1725 by Dimitri Bang RN  Outcome: Ongoing     Problem: Gas Exchange - Impaired:  Goal: Levels of oxygenation will improve  8/28/2019 0210 by Melania Olivier RN  Outcome: Ongoing  8/27/2019 1726 by Dimitri Bang RN  Outcome: Ongoing  8/27/2019 1725 by Dimitri Bang RN  Outcome: Ongoing     Problem: Falls - Risk of:  Goal: Will remain free from falls  8/28/2019 0210 by Melania Olivier RN  Outcome: Ongoing  8/27/2019 1726 by Dimitri Bang RN  Outcome: Ongoing  8/27/2019 1725 by Dimitri Bang RN  Outcome: Ongoing  Goal: Absence of physical injury  8/28/2019 0210 by Melania Olivier RN  Outcome: Ongoing  8/27/2019 1726 by Dimitri Bang RN  Outcome: Ongoing  8/27/2019 1725 by Dimitri Bang RN  Outcome: Ongoing     Problem: Risk for Impaired Skin Integrity  Goal: Tissue integrity - skin and mucous membranes  8/28/2019 0210 by Melania Olivier RN  Outcome: Ongoing  8/27/2019 1726 by Dimitri Bang RN  Outcome: Ongoing  8/27/2019
Problem: Discharge Planning:  Goal: Discharged to appropriate level of care  8/29/2019 0014 by Melania Olivier RN  Outcome: Ongoing  8/28/2019 1123 by Beulah Lazo RN  Outcome: Ongoing     Problem:  Activity Intolerance:  Goal: Ability to tolerate increased activity will improve  8/29/2019 0014 by Melania Olivier RN  Outcome: Ongoing  8/28/2019 1123 by Beulah Lazo RN  Outcome: Ongoing     Problem: Airway Clearance - Ineffective:  Goal: Ability to maintain a clear airway will improve  8/29/2019 0014 by Melania Olivier RN  Outcome: Ongoing  8/28/2019 1123 by Beulah Lazo RN  Outcome: Ongoing     Problem: Breathing Pattern - Ineffective:  Goal: Ability to achieve and maintain a regular respiratory rate will improve  8/29/2019 0014 by Melania Olivier RN  Outcome: Ongoing  8/28/2019 1123 by Beulah Lazo RN  Outcome: Ongoing
Problem: Discharge Planning:  Goal: Discharged to appropriate level of care  Description  Discharged to appropriate level of care  8/27/2019 1725 by Andres Cano RN  Outcome: Ongoing  8/27/2019 0407 by Gattio Matias RN  Outcome: Ongoing     Problem: Activity Intolerance:  Goal: Ability to tolerate increased activity will improve  Description  Ability to tolerate increased activity will improve  8/27/2019 1725 by Andres Cano RN  Outcome: Ongoing  8/27/2019 0407 by Gatito Matias RN  Outcome: Ongoing     Problem: Airway Clearance - Ineffective:  Goal: Ability to maintain a clear airway will improve  Description  Ability to maintain a clear airway will improve  8/27/2019 1725 by Andres Cano RN  Outcome: Ongoing  8/27/2019 0407 by Gatito Matias RN  Outcome: Ongoing     Problem: Breathing Pattern - Ineffective:  Goal: Ability to achieve and maintain a regular respiratory rate will improve  Description  Ability to achieve and maintain a regular respiratory rate will improve  8/27/2019 1725 by Andres Cano RN  Outcome: Ongoing  8/27/2019 0407 by Gatito Matias RN  Outcome: Ongoing     Problem: Gas Exchange - Impaired:  Goal: Levels of oxygenation will improve  Description  Levels of oxygenation will improve  8/27/2019 1725 by Andres Cano RN  Outcome: Ongoing  8/27/2019 0407 by Gatito Matias RN  Outcome: Ongoing     Problem: Falls - Risk of:  Goal: Will remain free from falls  Description  Will remain free from falls  8/27/2019 1725 by Andres Cano RN  Outcome: Ongoing  8/27/2019 0407 by aGtito Matias RN  Outcome: Ongoing  Goal: Absence of physical injury  Description  Absence of physical injury  8/27/2019 1725 by Andres Cano RN  Outcome: Ongoing  8/27/2019 0407 by Gatito Matias RN  Outcome: Ongoing     Problem: Risk for Impaired Skin Integrity  Goal: Tissue integrity - skin and mucous membranes  Description  Structural intactness and normal physiological function of skin and  mucous membranes.   8/27/2019 1725 by
Problem: Discharge Planning:  Goal: Discharged to appropriate level of care  Description  Discharged to appropriate level of care  8/28/2019 1123 by Erica French RN  Outcome: Ongoing  8/28/2019 0210 by Laney Verdugo RN  Outcome: Ongoing     Problem: Activity Intolerance:  Goal: Ability to tolerate increased activity will improve  Description  Ability to tolerate increased activity will improve  8/28/2019 1123 by Erica French RN  Outcome: Ongoing  8/28/2019 0210 by Laney Verdugo RN  Outcome: Ongoing     Problem: Airway Clearance - Ineffective:  Goal: Ability to maintain a clear airway will improve  Description  Ability to maintain a clear airway will improve  8/28/2019 1123 by Erica French RN  Outcome: Ongoing  8/28/2019 0210 by Laney Verdugo RN  Outcome: Ongoing     Problem: Breathing Pattern - Ineffective:  Goal: Ability to achieve and maintain a regular respiratory rate will improve  Description  Ability to achieve and maintain a regular respiratory rate will improve  8/28/2019 1123 by Erica French RN  Outcome: Ongoing  8/28/2019 0210 by Laney Verdugo RN  Outcome: Ongoing     Problem: Gas Exchange - Impaired:  Goal: Levels of oxygenation will improve  Description  Levels of oxygenation will improve  8/28/2019 1123 by Erica French RN  Outcome: Ongoing  8/28/2019 0210 by Laney Verdugo RN  Outcome: Ongoing     Problem: Falls - Risk of:  Goal: Will remain free from falls  Description  Will remain free from falls  8/28/2019 1123 by Erica French RN  Outcome: Ongoing  8/28/2019 0210 by Laney Verdugo RN  Outcome: Ongoing  Goal: Absence of physical injury  Description  Absence of physical injury  8/28/2019 1123 by Erica French RN  Outcome: Ongoing  8/28/2019 0210 by Laney Verdugo RN  Outcome: Ongoing     Problem: Risk for Impaired Skin Integrity  Goal: Tissue integrity - skin and mucous membranes  Description  Structural intactness and normal physiological function of skin and  mucous membranes.   8/28/2019 1123 by Amy Vasquez
Problem: Discharge Planning:  Goal: Discharged to appropriate level of care  Outcome: Ongoing     Problem:  Activity Intolerance:  Goal: Ability to tolerate increased activity will improve  Outcome: Ongoing     Problem: Airway Clearance - Ineffective:  Goal: Ability to maintain a clear airway will improve  Outcome: Ongoing     Problem: Breathing Pattern - Ineffective:  Goal: Ability to achieve and maintain a regular respiratory rate will improve  Outcome: Ongoing     Problem: Gas Exchange - Impaired:  Goal: Levels of oxygenation will improve  Outcome: Ongoing     Problem: Falls - Risk of:  Goal: Will remain free from falls  Outcome: Ongoing  Goal: Absence of physical injury  Outcome: Ongoing     Problem: Falls - Risk of:  Goal: Will remain free from falls  Outcome: Ongoing  Goal: Absence of physical injury  Outcome: Ongoing     Problem: Risk for Impaired Skin Integrity  Goal: Tissue integrity - skin and mucous membranes  Outcome: Ongoing     Problem: Risk for Impaired Skin Integrity  Goal: Tissue integrity - skin and mucous membranes  Outcome: Ongoing     Problem: Risk for Impaired Skin Integrity  Goal: Tissue integrity - skin and mucous membranes  Outcome: Ongoing
Resulted

## (undated) DEVICE — SUTURE MCRYL SZ 3-0 L27IN ABSRB UD L24MM PS-1 3/8 CIR PRIM Y936H

## (undated) DEVICE — SUTURE PROL SZ 5-0 L18IN NONABSORBABLE BLU C-1 L13MM 3/8 8717H

## (undated) DEVICE — TELFA NON-ADHERENT ABSORBENT DRESSING: Brand: TELFA

## (undated) DEVICE — SUTURE PROL 7-0 L18IN NONABSORBABLE BLU L9.3MM BV-1 3/8 CIR M8701

## (undated) DEVICE — TUBING SUCT 9 11FR L475IN RIG SHFT MINI SUC TIP DLP

## (undated) DEVICE — DUAL LUMEN STOMACH TUBE: Brand: SALEM SUMP

## (undated) DEVICE — TUBING, SUCTION, 9/32" X 10', STRAIGHT: Brand: MEDLINE

## (undated) DEVICE — COUNTER NDL 60 COUNT FOAM STRP SGL MAG

## (undated) DEVICE — Device

## (undated) DEVICE — SUTURE VCRL 2-0 L36IN ABSRB UD CTX L48MM 1/2 CIR TAPERPOINT J979H

## (undated) DEVICE — CLIP SM RED INTERN HMOCLP TITAN LIGATING

## (undated) DEVICE — LINER SUCT CANSTR 1500CC SEMI RIG W/ POR HYDROPHOBIC SHUT

## (undated) DEVICE — DECANTER FLD 9IN ST BG FOR ASEP TRNSF OF FLD

## (undated) DEVICE — SOLUTION IV IRRIG WATER 1000ML POUR BRL 2F7114

## (undated) DEVICE — ANESTHESIA CIRCUIT ADULT-LF: Brand: MEDLINE INDUSTRIES, INC.

## (undated) DEVICE — SUTURE VCRL SZ 4-0 L27IN ABSRB VLT L26MM SH 1/2 CIR J315H

## (undated) DEVICE — TOWEL,OR,DSP,ST,WHITE,DLX,XR,4/PK,20PK/C: Brand: MEDLINE

## (undated) DEVICE — SKIN AFFIX SURG ADHESIVE 72/CS 0.55ML: Brand: MEDLINE

## (undated) DEVICE — LOOP VES W25MM THK1MM MAXI RED SIL FLD REPELLENT 100 PER

## (undated) DEVICE — INTENDED FOR TISSUE SEPARATION, AND OTHER PROCEDURES THAT REQUIRE A SHARP SURGICAL BLADE TO PUNCTURE OR CUT.: Brand: BARD-PARKER ® STAINLESS STEEL BLADES

## (undated) DEVICE — YANKAUER,FLEXIBLE HANDLE,REGLR CAPACITY: Brand: MEDLINE INDUSTRIES, INC.

## (undated) DEVICE — PENCIL ES CRD L10FT HND SWCHING ROCK SWCH W/ EDGE COAT BLDE

## (undated) DEVICE — DRAPE,UTILITY,XL,4/PK,STERILE: Brand: MEDLINE

## (undated) DEVICE — CHLORAPREP 26ML ORANGE

## (undated) DEVICE — DRESSING TRNSPAR W5XL4.5IN FLM SHT SEMIPERMEABLE WIND

## (undated) DEVICE — CLIP INT SM WIDE RED TI TRNSVRS GRV CHEVRON SHP W/ PRECIS

## (undated) DEVICE — LOOP,VESSEL,MINI,BLUE,2/PK,STERILE: Brand: MEDLINE

## (undated) DEVICE — GLOVE SURG SZ 6 THK91MIL LTX FREE SYN POLYISOPRENE ANTI

## (undated) DEVICE — ELECTRODE ES L2.75IN S STL INSUL BLDE W/ SL EDGE

## (undated) DEVICE — BLADE CLIPPER GEN PURP NS

## (undated) DEVICE — SOLUTION IV 1000ML 0.9% SOD CHL FOR IRRIG PLAS CONT

## (undated) DEVICE — LINER,SEMI-RIGID,3000CC,50EA/CS: Brand: MEDLINE

## (undated) DEVICE — RESERVOIR,SUCTION,100CC,SILICONE: Brand: MEDLINE

## (undated) DEVICE — 34" SINGLE PATIENT USE HOVERMATT BREATHABLE: Brand: SINGLE PATIENT USE HOVERMATT

## (undated) DEVICE — Z INACTIVE USE 2641837 CLIP LIG M BLU TI HRT SHP WIRE HORZ 600 PER BX

## (undated) DEVICE — SET CATH 20GA L1.5IN RAD ART POLYUR RADPQ NDL INTEGR SPR

## (undated) DEVICE — SUTURE PERMAHAND SZ 2-0 L17X18IN NONABSORBABLE BLK SILK SA65H

## (undated) DEVICE — CORD ES L15FT PT RET REUSE VALLEYLAB REM

## (undated) DEVICE — GLOVE ORANGE PI 7   MSG9070

## (undated) DEVICE — SUTURE VCRL SZ 2-0 L27IN ABSRB UD L26MM CT-2 1/2 CIR J269H

## (undated) DEVICE — SPONGE DRN W4XL4IN RAYON/POLYESTER 6 PLY NONWOVEN PRECUT

## (undated) DEVICE — ELECTRODE ES AD CRDLSS PT RET REM POLYHESIVE

## (undated) DEVICE — 3M™ IOBAN™ 2 ANTIMICROBIAL INCISE DRAPE 6650EZ: Brand: IOBAN™ 2

## (undated) DEVICE — DRAIN SURG W10XL20CM SIL SMOOTH FLAT 3/4 PERF DBL WRP

## (undated) DEVICE — SHEET, T, LAPAROTOMY, STERILE: Brand: MEDLINE

## (undated) DEVICE — CATHETER ADAPTER: Brand: ADDTO

## (undated) DEVICE — GAUZE,SPONGE,4"X4",16PLY,XRAY,STRL,LF: Brand: MEDLINE

## (undated) DEVICE — TUBING, SUCTION, 3/16" X 10', STRAIGHT: Brand: MEDLINE

## (undated) DEVICE — TAPE MED W1/8XL30IN WHT POLY

## (undated) DEVICE — KIT CATHETER 20GA L12CM ART CUST

## (undated) DEVICE — GOWN,ECLIPSE,POLYRNF,BRTHSLV,L,30/CS: Brand: MEDLINE

## (undated) DEVICE — SUTURE PROL SZ 6-0 L24IN NONABSORBABLE BLU L13MM C-1 3/8 8726H